# Patient Record
Sex: MALE | Race: WHITE | Employment: OTHER | URBAN - METROPOLITAN AREA
[De-identification: names, ages, dates, MRNs, and addresses within clinical notes are randomized per-mention and may not be internally consistent; named-entity substitution may affect disease eponyms.]

---

## 2017-05-16 ENCOUNTER — HOSPITAL ENCOUNTER (OUTPATIENT)
Dept: GENERAL RADIOLOGY | Age: 77
Discharge: HOME | End: 2017-05-16
Attending: FAMILY MEDICINE
Payer: MEDICARE

## 2017-05-16 ENCOUNTER — LAB ENCOUNTER (OUTPATIENT)
Dept: LAB | Age: 77
End: 2017-05-16
Attending: FAMILY MEDICINE
Payer: MEDICARE

## 2017-05-16 DIAGNOSIS — Z00.00 ROUTINE GENERAL MEDICAL EXAMINATION AT A HEALTH CARE FACILITY: ICD-10-CM

## 2017-05-16 DIAGNOSIS — Z00.00 ROUTINE GENERAL MEDICAL EXAMINATION AT HEALTH CARE FACILITY: Primary | ICD-10-CM

## 2017-05-16 PROCEDURE — 80053 COMPREHEN METABOLIC PANEL: CPT

## 2017-05-16 PROCEDURE — 85025 COMPLETE CBC W/AUTO DIFF WBC: CPT

## 2017-05-16 PROCEDURE — 71020 XR CHEST PA + LAT CHEST (CPT=71020): CPT | Performed by: FAMILY MEDICINE

## 2017-05-16 PROCEDURE — 36415 COLL VENOUS BLD VENIPUNCTURE: CPT

## 2017-05-16 PROCEDURE — 80061 LIPID PANEL: CPT

## 2017-05-23 ENCOUNTER — HOSPITAL ENCOUNTER (INPATIENT)
Facility: HOSPITAL | Age: 77
LOS: 7 days | Discharge: HOME | DRG: 392 | End: 2017-05-30
Attending: EMERGENCY MEDICINE | Admitting: FAMILY MEDICINE
Payer: MEDICARE

## 2017-05-23 ENCOUNTER — HOSPITAL ENCOUNTER (OUTPATIENT)
Dept: CT IMAGING | Age: 77
Discharge: HOME | DRG: 392 | End: 2017-05-23
Attending: FAMILY MEDICINE
Payer: MEDICARE

## 2017-05-23 DIAGNOSIS — R10.9 ABDOMINAL PAIN, UNSPECIFIED LOCATION: ICD-10-CM

## 2017-05-23 DIAGNOSIS — N18.9 ACUTE ON CHRONIC RENAL INSUFFICIENCY: ICD-10-CM

## 2017-05-23 DIAGNOSIS — N28.9 ACUTE ON CHRONIC RENAL INSUFFICIENCY: ICD-10-CM

## 2017-05-23 DIAGNOSIS — K57.92 ACUTE DIVERTICULITIS: Primary | ICD-10-CM

## 2017-05-23 PROCEDURE — 85027 COMPLETE CBC AUTOMATED: CPT | Performed by: FAMILY MEDICINE

## 2017-05-23 PROCEDURE — 80048 BASIC METABOLIC PNL TOTAL CA: CPT

## 2017-05-23 PROCEDURE — 80053 COMPREHEN METABOLIC PANEL: CPT | Performed by: EMERGENCY MEDICINE

## 2017-05-23 PROCEDURE — 99285 EMERGENCY DEPT VISIT HI MDM: CPT

## 2017-05-23 PROCEDURE — 85025 COMPLETE CBC W/AUTO DIFF WBC: CPT

## 2017-05-23 PROCEDURE — 74176 CT ABD & PELVIS W/O CONTRAST: CPT | Performed by: FAMILY MEDICINE

## 2017-05-23 PROCEDURE — 82565 ASSAY OF CREATININE: CPT

## 2017-05-23 PROCEDURE — 96365 THER/PROPH/DIAG IV INF INIT: CPT

## 2017-05-23 RX ORDER — ALFUZOSIN HYDROCHLORIDE 10 MG/1
10 TABLET, EXTENDED RELEASE ORAL
Status: DISCONTINUED | OUTPATIENT
Start: 2017-05-24 | End: 2017-05-30

## 2017-05-23 RX ORDER — FINASTERIDE 5 MG/1
5 TABLET, FILM COATED ORAL DAILY
Status: DISCONTINUED | OUTPATIENT
Start: 2017-05-24 | End: 2017-05-30

## 2017-05-23 RX ORDER — POTASSIUM CHLORIDE 20 MEQ/1
20 TABLET, EXTENDED RELEASE ORAL ONCE
Status: COMPLETED | OUTPATIENT
Start: 2017-05-23 | End: 2017-05-23

## 2017-05-23 RX ORDER — HYDROCORTISONE ACETATE 0.5 %
1 CREAM (GRAM) TOPICAL DAILY
COMMUNITY
End: 2017-05-23 | Stop reason: CLARIF

## 2017-05-23 RX ORDER — OXCARBAZEPINE 300 MG/1
600 TABLET, FILM COATED ORAL NIGHTLY
Status: DISCONTINUED | OUTPATIENT
Start: 2017-05-23 | End: 2017-05-30

## 2017-05-23 RX ORDER — OXCARBAZEPINE 300 MG/1
200 TABLET, FILM COATED ORAL DAILY
COMMUNITY
End: 2020-07-10

## 2017-05-23 RX ORDER — ROSUVASTATIN CALCIUM 10 MG/1
10 TABLET, COATED ORAL NIGHTLY
Status: DISCONTINUED | OUTPATIENT
Start: 2017-05-23 | End: 2017-05-30

## 2017-05-23 RX ORDER — ASPIRIN 81 MG/1
81 TABLET, CHEWABLE ORAL DAILY
COMMUNITY
End: 2020-07-10

## 2017-05-23 RX ORDER — ASPIRIN 81 MG/1
81 TABLET, CHEWABLE ORAL DAILY
Status: DISCONTINUED | OUTPATIENT
Start: 2017-05-24 | End: 2017-05-25

## 2017-05-23 RX ORDER — QUINIDINE GLUCONATE 324 MG
TABLET, EXTENDED RELEASE ORAL
COMMUNITY
End: 2019-12-23

## 2017-05-23 RX ORDER — OXCARBAZEPINE 150 MG/1
150 TABLET, FILM COATED ORAL DAILY
COMMUNITY
End: 2020-07-10

## 2017-05-23 RX ORDER — OXCARBAZEPINE 300 MG/1
200 TABLET, FILM COATED ORAL DAILY
Status: DISCONTINUED | OUTPATIENT
Start: 2017-05-24 | End: 2017-05-24

## 2017-05-23 RX ORDER — ROSUVASTATIN CALCIUM 10 MG/1
10 TABLET, COATED ORAL NIGHTLY
COMMUNITY
End: 2020-11-06

## 2017-05-23 RX ORDER — METHYLPHENIDATE HYDROCHLORIDE 10 MG/1
10 TABLET ORAL DAILY
Status: DISCONTINUED | OUTPATIENT
Start: 2017-05-24 | End: 2017-05-30

## 2017-05-23 RX ORDER — OXCARBAZEPINE 150 MG/1
450 TABLET, FILM COATED ORAL NIGHTLY
COMMUNITY
End: 2017-07-06

## 2017-05-23 RX ORDER — TRIAMTERENE AND HYDROCHLOROTHIAZIDE 75; 50 MG/1; MG/1
1 TABLET ORAL DAILY
Status: DISCONTINUED | OUTPATIENT
Start: 2017-05-24 | End: 2017-05-30

## 2017-05-23 RX ORDER — DOXEPIN HYDROCHLORIDE 50 MG/1
1 CAPSULE ORAL DAILY
Status: DISCONTINUED | OUTPATIENT
Start: 2017-05-24 | End: 2017-05-30

## 2017-05-23 RX ORDER — DOXEPIN HYDROCHLORIDE 50 MG/1
1 CAPSULE ORAL DAILY
COMMUNITY
End: 2020-07-10

## 2017-05-23 RX ORDER — DULOXETIN HYDROCHLORIDE 60 MG/1
150 CAPSULE, DELAYED RELEASE ORAL DAILY
COMMUNITY
End: 2017-07-06

## 2017-05-23 RX ORDER — LISINOPRIL 20 MG/1
20 TABLET ORAL DAILY
Status: DISCONTINUED | OUTPATIENT
Start: 2017-05-24 | End: 2017-05-30

## 2017-05-23 RX ORDER — METHYLPHENIDATE HYDROCHLORIDE 10 MG/1
10 TABLET ORAL DAILY
COMMUNITY
End: 2020-07-10

## 2017-05-23 RX ORDER — LISINOPRIL 20 MG/1
20 TABLET ORAL DAILY
COMMUNITY
End: 2020-11-06

## 2017-05-23 RX ORDER — TAMSULOSIN HYDROCHLORIDE 0.4 MG/1
0.4 CAPSULE ORAL DAILY
COMMUNITY

## 2017-05-23 RX ORDER — QUINIDINE GLUCONATE 324 MG
1500 TABLET, EXTENDED RELEASE ORAL DAILY
Status: DISCONTINUED | OUTPATIENT
Start: 2017-05-24 | End: 2017-05-23

## 2017-05-23 RX ORDER — TRIAMTERENE AND HYDROCHLOROTHIAZIDE 75; 50 MG/1; MG/1
1 TABLET ORAL DAILY
COMMUNITY
End: 2020-07-10

## 2017-05-23 RX ORDER — OXCARBAZEPINE 600 MG/1
600 TABLET, FILM COATED ORAL NIGHTLY
COMMUNITY
End: 2020-07-10

## 2017-05-23 RX ORDER — FINASTERIDE 5 MG/1
5 TABLET, FILM COATED ORAL DAILY
COMMUNITY
End: 2019-12-23

## 2017-05-23 RX ORDER — DULOXETIN HYDROCHLORIDE 30 MG/1
150 CAPSULE, DELAYED RELEASE ORAL DAILY
Status: DISCONTINUED | OUTPATIENT
Start: 2017-05-24 | End: 2017-05-30

## 2017-05-23 NOTE — ED PROVIDER NOTES
Patient Seen in: [facility] AND [facility] Emergency Department    History   No chief complaint on file. Stated Complaint: Abdominal Pain    HPI    68-year-old male presents for complaint of diverticulitis.   Patient reports that he has been having abdominal p negative. Positive for stated complaint: Abdominal Pain  Other systems are as noted in HPI. Constitutional and vital signs reviewed. All other systems reviewed and negative except as noted above.     PSFH elements reviewed from today and agreed e (*)     HCT 39.7 (*)     MPV 7.3 (*)     Neutrophil Absolute 9.7 (*)     Monocyte Absolute 1.1 (*)     All other components within normal limits   CBC WITH DIFFERENTIAL WITH PLATELET    Narrative:      The following orders were created for panel order CBC W PANCREAS: No lesion, fluid collection, ductal dilatation, or atrophy. ADRENALS: No defined mass or abnormal enlargement.   KIDNEYS: 27 mm diameter water density focus in the lateral midpole of the left kidney, compatible with cyst however it is not fully c phlegmonous change. No organized focal fluid collection or evidence of bowel perforation. Given the extent of inflammation, followup imaging is recommended in approximately 7-10 days to exclude abscess formation.  2.  Small bilateral fat-containing inguin

## 2017-05-23 NOTE — ED NOTES
Presents to ER with wife - referred by PMD for abnormal CT results. Pt with c/o pain across lower abdomen x4-5 days, outpatient CT performed at 1030am, + for diverticulitis. Also c/o dark loose stools x2-3 months.  States his pain is \"mild\" at this time a

## 2017-05-24 ENCOUNTER — PRIOR ORIGINAL RECORDS (OUTPATIENT)
Dept: OTHER | Age: 77
End: 2017-05-24

## 2017-05-24 PROBLEM — I10 HIGH BLOOD PRESSURE: Status: ACTIVE | Noted: 2017-05-24

## 2017-05-24 PROBLEM — G47.30 SLEEP APNEA: Status: ACTIVE | Noted: 2017-05-24

## 2017-05-24 PROBLEM — F31.9 BIPOLAR AFFECTIVE (HCC): Status: ACTIVE | Noted: 2017-05-24

## 2017-05-24 PROBLEM — E78.00 HIGH CHOLESTEROL: Status: ACTIVE | Noted: 2017-05-24

## 2017-05-24 PROCEDURE — 81003 URINALYSIS AUTO W/O SCOPE: CPT | Performed by: FAMILY MEDICINE

## 2017-05-24 PROCEDURE — 84132 ASSAY OF SERUM POTASSIUM: CPT | Performed by: FAMILY MEDICINE

## 2017-05-24 PROCEDURE — 80061 LIPID PANEL: CPT | Performed by: FAMILY MEDICINE

## 2017-05-24 RX ORDER — METRONIDAZOLE 500 MG/100ML
500 INJECTION, SOLUTION INTRAVENOUS EVERY 8 HOURS
Status: DISCONTINUED | OUTPATIENT
Start: 2017-05-24 | End: 2017-05-24

## 2017-05-24 RX ORDER — POTASSIUM CHLORIDE 20 MEQ/1
40 TABLET, EXTENDED RELEASE ORAL ONCE
Status: COMPLETED | OUTPATIENT
Start: 2017-05-24 | End: 2017-05-24

## 2017-05-24 RX ORDER — DEXTROSE, SODIUM CHLORIDE, AND POTASSIUM CHLORIDE 5; .45; .3 G/100ML; G/100ML; G/100ML
INJECTION INTRAVENOUS CONTINUOUS
Status: DISCONTINUED | OUTPATIENT
Start: 2017-05-24 | End: 2017-05-30

## 2017-05-24 RX ORDER — OXCARBAZEPINE 300 MG/1
150 TABLET, FILM COATED ORAL DAILY
Status: DISCONTINUED | OUTPATIENT
Start: 2017-05-24 | End: 2017-05-30

## 2017-05-24 RX ORDER — 0.9 % SODIUM CHLORIDE 0.9 %
VIAL (ML) INJECTION
Status: COMPLETED
Start: 2017-05-24 | End: 2017-05-24

## 2017-05-24 NOTE — H&P
[address] Patient Status:  Inpatient    10/21/1940 MRN [id number]   Location [facility] 5SW/SE Attending Glynis Meckel, MD   Hosp Day # 1 PCP Media Seip MD, Noé Haro MD     Date:  2017  Toby Griggs 150 MG Oral Tab Take 450 mg by mouth nightly. Glucosamine-Chondroit-Vit C-Mn (GLUCOSAMINE-CHONDROITIN) Oral Tab Take by mouth. aspirin 81 MG Oral Chew Tab Chew 81 mg by mouth daily. OXcarbazepine 600 MG Oral Tab Take 600 mg by mouth nightly.    Alcides Llanes fat-containing inguinal hernias. 3.  Left renal cyst. 4.  3 mm micronodule in the middle lobe, stable since 10/4/12 CT chest suggesting a benign process. 5.  Coronary artery calcifications. 6.  Large hiatal hernia.    Diverticulitis and followup recommendat

## 2017-05-24 NOTE — PLAN OF CARE
Problem: Patient Centered Care  Goal: Patient preferences are identified and integrated in the patient’s plan of care  Interventions:  - What would you like us to know as we care for you?  - Provide timely, complete, and accurate information to patient/fam algorithm/standards of care as needed  Outcome: Progressing

## 2017-05-24 NOTE — PLAN OF CARE
Problem: Patient Centered Care  Goal: Patient preferences are identified and integrated in the patient’s plan of care  Interventions:  - What would you like us to know as we care for you?   - Provide timely, complete, and accurate information to patient/fa routine/schedule  - Consider collaborating with pharmacy to review patient’s medication profile   Outcome: Progressing    Problem: SAFETY ADULT - FALL  Goal: Free from fall injury  INTERVENTIONS:  - Assess pt frequently for physical needs  - Identify cogni

## 2017-05-25 ENCOUNTER — APPOINTMENT (OUTPATIENT)
Dept: CT IMAGING | Facility: HOSPITAL | Age: 77
DRG: 392 | End: 2017-05-25
Attending: FAMILY MEDICINE
Payer: MEDICARE

## 2017-05-25 PROCEDURE — 80053 COMPREHEN METABOLIC PANEL: CPT | Performed by: FAMILY MEDICINE

## 2017-05-25 PROCEDURE — 74176 CT ABD & PELVIS W/O CONTRAST: CPT | Performed by: FAMILY MEDICINE

## 2017-05-25 PROCEDURE — 84132 ASSAY OF SERUM POTASSIUM: CPT | Performed by: FAMILY MEDICINE

## 2017-05-25 PROCEDURE — 85025 COMPLETE CBC W/AUTO DIFF WBC: CPT | Performed by: FAMILY MEDICINE

## 2017-05-25 PROCEDURE — 83735 ASSAY OF MAGNESIUM: CPT | Performed by: FAMILY MEDICINE

## 2017-05-25 RX ORDER — HYDROMORPHONE HYDROCHLORIDE 1 MG/ML
0.5 INJECTION, SOLUTION INTRAMUSCULAR; INTRAVENOUS; SUBCUTANEOUS EVERY 2 HOUR PRN
Status: DISCONTINUED | OUTPATIENT
Start: 2017-05-25 | End: 2017-05-30

## 2017-05-25 RX ORDER — POTASSIUM CHLORIDE 20 MEQ/1
40 TABLET, EXTENDED RELEASE ORAL ONCE
Status: COMPLETED | OUTPATIENT
Start: 2017-05-25 | End: 2017-05-25

## 2017-05-25 RX ORDER — HYDROMORPHONE HYDROCHLORIDE 1 MG/ML
0.3 INJECTION, SOLUTION INTRAMUSCULAR; INTRAVENOUS; SUBCUTANEOUS EVERY 2 HOUR PRN
Status: DISCONTINUED | OUTPATIENT
Start: 2017-05-25 | End: 2017-05-30

## 2017-05-25 RX ORDER — HEPARIN SODIUM 5000 [USP'U]/ML
5000 INJECTION, SOLUTION INTRAVENOUS; SUBCUTANEOUS EVERY 8 HOURS
Status: DISCONTINUED | OUTPATIENT
Start: 2017-05-25 | End: 2017-05-30

## 2017-05-25 RX ORDER — ACETAMINOPHEN 500 MG
1000 TABLET ORAL EVERY 8 HOURS PRN
Status: DISCONTINUED | OUTPATIENT
Start: 2017-05-25 | End: 2017-05-30

## 2017-05-25 NOTE — PLAN OF CARE
Problem: Patient Centered Care  Goal: Patient preferences are identified and integrated in the patient’s plan of care  Interventions:  - What would you like us to know as we care for you?  - Provide timely, complete, and accurate information to patient/fam function  - Maintain adequate hydration with IV or PO as ordered and tolerated  - Evaluate effectiveness of GI medications  - Encourage mobilization and activity  - Obtain nutritional consult as needed  - Establish a toileting routine/schedule  - Consider

## 2017-05-25 NOTE — PROGRESS NOTES
[facility]    Progress Note    Malachi Oliveros Patient Status:  Inpatient    10/21/1940 MRN [id number]   Location [facility] 5SW/SE Attending Isabella Hsieh MD   Hosp Day # 2 PCP Lilian Mackenzie MD, Yani Morejon MD       Subjective:   Nury Holt

## 2017-05-25 NOTE — PAYOR COMM NOTE
Admit Orders     Start     Ordered    05/23/17 1941  Admit to inpatient Once -  ([address])   Once     Ordering Provider:  Kenyetta Redmond MD   Question Answer Comment   Diagnosis Acute diverticulitis    Level of Care Acute    Bed MG Oral Tab,  Take 10 mg by mouth nightly. lisinopril 20 MG Oral Tab,  Take 20 mg by mouth daily. Triamterene-HCTZ 75-50 MG Oral Tab,  Take 1 tablet by mouth daily. DULoxetine HCl 60 MG Oral Cap DR Particles,  Take 150 mg by mouth daily.    Nehemiah Smith well-nourished. HENT:   Head: Normocephalic and atraumatic. Eyes: EOM are normal. Pupils are equal, round, and reactive to light. Neck: Normal range of motion. Neck supple. Cardiovascular: Normal rate and regular rhythm.     Pulmonary/Chest: Effort MDM    CBC with leukocytosis. CMP with acute on chronic renal insufficiency. IV fluids are given. He is started on Zosyn for his diverticulitis. There is no evidence for any perforation. He stable for medical floor.     Imaging:   Ct Abdomen+pelv obstruction. Terminal ileum is within normal limits. The appendix has been removed. There is no organized focal fluid collection, evidence of bowel necrosis, or evidence of bowel perforation.  ABDOMINAL WALL: Small bilateral fat-containing inguinal hernias Prescribed:  Current Discharge Medication List        Present on Admission  Date Reviewed: 4/30/2014          ICD-10-CM Noted POA    Acute diverticulitis K57.92 5/23/2017 Unknown                   Signed by Zenon Guevara MD on 5/23/2017  5:12 P Allergies    Prescriptions prior to admission:  Rosuvastatin Calcium 10 MG Oral Tab Take 10 mg by mouth nightly. lisinopril 20 MG Oral Tab Take 20 mg by mouth daily. Triamterene-HCTZ 75-50 MG Oral Tab Take 1 tablet by mouth daily.    DULoxetine HCl 60 M 05/23/2017   AST 51* 05/23/2017   ALT 67* 05/23/2017       Ct Abdomen+pelvis(cpt=74176)    5/23/2017  CONCLUSION:  1. There are findings compatible with acute diverticulitis in the distribution of the distal descending colon and proximal sigmoid colon.   Kermit Goldberg

## 2017-05-26 PROCEDURE — 84100 ASSAY OF PHOSPHORUS: CPT | Performed by: SURGERY

## 2017-05-26 PROCEDURE — 85025 COMPLETE CBC W/AUTO DIFF WBC: CPT | Performed by: SURGERY

## 2017-05-26 PROCEDURE — 83735 ASSAY OF MAGNESIUM: CPT | Performed by: SURGERY

## 2017-05-26 PROCEDURE — 80048 BASIC METABOLIC PNL TOTAL CA: CPT | Performed by: SURGERY

## 2017-05-26 NOTE — PROGRESS NOTES
[facility]    Progress Note    Krysta Mayo Patient Status:  Inpatient    10/21/1940 MRN [id number]   Location [facility] 5SW/SE Attending Phong Laureano MD   Hosp Day # 3 PCP Anastacia Redmond MD, Mitch Downs MD       Subjective:   Patsy Rios extensive surrounding inflammatory stranding and phlegmon. No evidence of abscess or free air. 2. Findings were described by [facility].                  Unknown Ruby BAKER, Yaima Tomas MD  5/26/2017

## 2017-05-26 NOTE — CONSULTS
[facility]    CONSULTATION REPORT    Pablo Penarudi Zuleta  FUL:[id number]  GMS:[id number]  LOS:2    Date of Admission:  5/23/2017  Date of Consult:  5/25/2017     Reason for Consultation: diverticulitis       History of Present Illness:  Marsh Adam Me 40 mEq infusion, , Intravenous, Continuous  •  OXcarbazepine (TRILEPTAL) tab 150 mg, 150 mg, Oral, Daily  •  DULoxetine HCl (CYMBALTA) DR particles cap 150 mg, 150 mg, Oral, Daily  •  finasteride (PROSCAR) tab 5 mg, 5 mg, Oral, Daily  •  aspirin chewable t Extremities: calves nontender, no edema, motor intact, sensory intact and SCD's on    Laboratory Data:  Recent Labs   Lab  05/23/17   1533  05/23/17   2142  05/25/17   0701   RBC  4.21*  4.06*  4.01*   HGB  13.4*  12.9*  12.6*   HCT  39.7*  38.0*  37.3* antibiotics, etc.  Option of peritoneal lavage discussed with the patient if he continues to clinically deteriorate.   He understood that there is a possibility he may need urgent operation with colostomy as a lifesaving maneuver although there is no need f

## 2017-05-26 NOTE — PROGRESS NOTES
[facility] - [facility]    General Surgery Progress Note  Izzy Howard  FBQ:[id number]  # 3       Subjective:   Complains of gas, bloating and lower abdominal pain  Passing flatus      Exam:     General: awake and alert, in no acute distress and in goo Recent Labs   Lab  05/23/17   2142  05/24/17   0530  05/25/17   0701  05/26/17   0520   GLU  112*   --   114*  135*   BUN  38*   --   23*  17   CREATSERUM  2.19*   --   1.84*  1.53*   GFRAA  36*   --   44*  54*   GFRNAA  30*   --   36*  45*   CA  8.4

## 2017-05-26 NOTE — PLAN OF CARE
Problem: Patient Centered Care  Goal: Patient preferences are identified and integrated in the patient’s plan of care  Interventions:  - What would you like us to know as we care for you?  Patient wants to be kept updated  - Provide timely, complete, and ac denies nausea, IV infusion D5.45 with 40mEq @ 100mL/hour  Goal: Maintains or returns to baseline bowel function  INTERVENTIONS:  - Assess bowel function  - Maintain adequate hydration with IV or PO as ordered and tolerated  - Evaluate effectiveness of GI m

## 2017-05-27 PROCEDURE — 85025 COMPLETE CBC W/AUTO DIFF WBC: CPT | Performed by: FAMILY MEDICINE

## 2017-05-27 PROCEDURE — 80048 BASIC METABOLIC PNL TOTAL CA: CPT | Performed by: FAMILY MEDICINE

## 2017-05-27 NOTE — PROGRESS NOTES
[facility]    Progress Note    Malachi Oliveros Patient Status:  Inpatient    10/21/1940 MRN [id number]   Location [facility] 5SW/SE Attending Isabella Hsieh MD   Hosp Day # 4 PCP Lilian Mackenzie MD, Yani Morejon MD       Subjective:   Nury Holt air. 2. Findings were described by [facility].                  Isreal Jones MD, Martha Mike MD  5/27/2017

## 2017-05-27 NOTE — PLAN OF CARE
Problem: Patient Centered Care  Goal: Patient preferences are identified and integrated in the patient’s plan of care  Interventions:  - What would you like us to know as we care for you?  - Provide timely, complete, and accurate information to patient/fam Maintain adequate hydration with IV or PO as ordered and tolerated  - Nasogastric tube to low intermittent suction as ordered  - Evaluate effectiveness of ordered antiemetic medications  - Provide nonpharmacologic comfort measures as appropriate  - Advance

## 2017-05-27 NOTE — PROGRESS NOTES
[facility] - [facility]    General Surgery Progress Note  Yani Pereira  KORY:[id number]  HD# 4       Subjective:   No unusual complaints, improved gas, bloating and lower abdominal pain  Passing flatus and liquid stool small amounts     Exam:     General: CA  8.5  8.5  8.4*   NA  136  136  135*   K  3.8  3.8  4.2  3.9   CL  100  104  102   CO2  26  24  25         Lab Results  Component Value Date   WBC 9.2 05/27/2017   HGB 12.5 05/27/2017   HCT 36.4 05/27/2017    05/27/2017    05/27/2017   K

## 2017-05-27 NOTE — PLAN OF CARE
Problem: Patient Centered Care  Goal: Patient preferences are identified and integrated in the patient’s plan of care  Interventions:  - Provide timely, complete, and accurate information to patient/family  - Incorporate patient and family knowledge, value review patient’s medication profile   Outcome: Not Progressing    Problem: SAFETY ADULT - FALL  Goal: Free from fall injury  INTERVENTIONS:  - Assess pt frequently for physical needs  - Identify cognitive and physical deficits and behaviors that affect ris

## 2017-05-28 PROCEDURE — 80048 BASIC METABOLIC PNL TOTAL CA: CPT | Performed by: FAMILY MEDICINE

## 2017-05-28 PROCEDURE — 84100 ASSAY OF PHOSPHORUS: CPT | Performed by: SURGERY

## 2017-05-28 PROCEDURE — 83735 ASSAY OF MAGNESIUM: CPT | Performed by: SURGERY

## 2017-05-28 PROCEDURE — 85025 COMPLETE CBC W/AUTO DIFF WBC: CPT | Performed by: FAMILY MEDICINE

## 2017-05-28 RX ORDER — MAGNESIUM OXIDE 400 MG (241.3 MG MAGNESIUM) TABLET
400 TABLET ONCE
Status: COMPLETED | OUTPATIENT
Start: 2017-05-28 | End: 2017-05-28

## 2017-05-28 NOTE — PROGRESS NOTES
[facility] \[facility]\"" - [facility]    General Surgery Progress Note  Luis Benavides  RNS:[id number]  # 5       Subjective:   No unusual complaints, improved gas, bloating and lower abdominal pain  Passing flatus and liquid stool small amounts     Exam:     General: GFRNAA  45*  39*  40*   CA  8.5  8.4*  8.7   NA  136  135*  136   K  4.2  3.9  4.0   CL  104  102  103   CO2  24  25  26         Lab Results  Component Value Date   WBC 6.9 05/28/2017   HGB 12.6 05/28/2017   HCT 36.7 05/28/2017    05/28/2017   NA

## 2017-05-28 NOTE — PLAN OF CARE
I received phone call from Telemetry reporting that patient had a brief heart rate drop to 35, and resumed back to the 90's. Patient was immediately assess. Patient was resting in the bed at present time. Patient denied chest pain or discomfort at time.  No

## 2017-05-28 NOTE — PROGRESS NOTES
[facility]    Progress Note    Karin Isaacs Patient Status:  Inpatient    10/21/1940 MRN [id number]   Location [facility] 5SW/SE Attending Felix Valerio MD   Hosp Day # 5 PCP Jaimee Carrreo MD, Terri Pepper MD       Subjective:   Norm Shirts

## 2017-05-28 NOTE — PLAN OF CARE
Problem: Patient Centered Care  Goal: Patient preferences are identified and integrated in the patient’s plan of care  Interventions:  - Provide timely, complete, and accurate information to patient/family  - Incorporate patient and family knowledge, value medication profile   Outcome: Progressing    Problem: SAFETY ADULT - FALL  Goal: Free from fall injury  INTERVENTIONS:  - Assess pt frequently for physical needs  - Identify cognitive and physical deficits and behaviors that affect risk of falls.   - Instit

## 2017-05-28 NOTE — PLAN OF CARE
Problem: Patient Centered Care  Goal: Patient preferences are identified and integrated in the patient’s plan of care  Interventions:  - What would you like us to know as we care for you  - Provide timely, complete, and accurate information to patient/fami absence of nausea and vomiting  INTERVENTIONS:  - Maintain adequate hydration with IV or PO as ordered and tolerated  - Nasogastric tube to low intermittent suction as ordered  - Evaluate effectiveness of ordered antiemetic medications  - Provide nonpharma repositions self while in bed every 2 hours.

## 2017-05-29 PROCEDURE — 80048 BASIC METABOLIC PNL TOTAL CA: CPT | Performed by: FAMILY MEDICINE

## 2017-05-29 PROCEDURE — 84466 ASSAY OF TRANSFERRIN: CPT | Performed by: FAMILY MEDICINE

## 2017-05-29 PROCEDURE — 83540 ASSAY OF IRON: CPT | Performed by: FAMILY MEDICINE

## 2017-05-29 PROCEDURE — 85025 COMPLETE CBC W/AUTO DIFF WBC: CPT | Performed by: FAMILY MEDICINE

## 2017-05-29 PROCEDURE — 84100 ASSAY OF PHOSPHORUS: CPT | Performed by: SURGERY

## 2017-05-29 PROCEDURE — 83735 ASSAY OF MAGNESIUM: CPT | Performed by: FAMILY MEDICINE

## 2017-05-29 RX ORDER — MAGNESIUM OXIDE 400 MG (241.3 MG MAGNESIUM) TABLET
400 TABLET ONCE
Status: COMPLETED | OUTPATIENT
Start: 2017-05-29 | End: 2017-05-29

## 2017-05-29 NOTE — PROGRESS NOTES
[facility] - [facility]    General Surgery Progress Note  Karin Isaacs  EK  HD# 6       Subjective:   No unusual complaints, improved gas, bloating and lower abdominal pain  Passing flatus and liquid stool small amounts     Exam:     General: 4.0     102  103   CO2  25  26          Carolyn Brower MD FACS  05/29/2017  8:14 AM

## 2017-05-29 NOTE — PLAN OF CARE
Problem: Patient Centered Care  Goal: Patient preferences are identified and integrated in the patient’s plan of care  Interventions:  - What would you like us to know as we care for you  - Provide timely, complete, and accurate information to patient/fami intravenous intake. (4) Vital signs and labs are monitored and recorded. (5) Patient is encouraged to ambulate in room and hallway and tolerates it well.     Problem: GASTROINTESTINAL - ADULT  Goal: Minimal or absence of nausea and vomiting  INTERVENTIONS document risk factors for pressure ulcer development  - Assess and document skin integrity  - Monitor for areas of redness and/or skin breakdown  - Initiate interventions, skin care algorithm/standards of care as needed   Outcome: Progressing  (1) Patient

## 2017-05-29 NOTE — PROGRESS NOTES
[facility]    Progress Note    Edilberto Zuleta Patient Status:  Inpatient    10/21/1940 MRN [id number]   Location [facility] 5SW/SE Attending Alexei Elizalde MD   Hosp Day # 6 PCP Luke Roach MD, Mercedes Montoya MD       Subjective:   Liss Cooley

## 2017-05-30 VITALS
OXYGEN SATURATION: 93 % | HEIGHT: 69 IN | HEART RATE: 70 BPM | DIASTOLIC BLOOD PRESSURE: 58 MMHG | SYSTOLIC BLOOD PRESSURE: 110 MMHG | RESPIRATION RATE: 20 BRPM | TEMPERATURE: 98 F | WEIGHT: 272.13 LBS | BODY MASS INDEX: 40.31 KG/M2

## 2017-05-30 PROCEDURE — 83735 ASSAY OF MAGNESIUM: CPT | Performed by: FAMILY MEDICINE

## 2017-05-30 PROCEDURE — 80048 BASIC METABOLIC PNL TOTAL CA: CPT | Performed by: SURGERY

## 2017-05-30 PROCEDURE — 85025 COMPLETE CBC W/AUTO DIFF WBC: CPT | Performed by: SURGERY

## 2017-05-30 RX ORDER — LEVOFLOXACIN 500 MG/1
500 TABLET, FILM COATED ORAL DAILY
Qty: 10 TABLET | Refills: 0 | Status: SHIPPED | OUTPATIENT
Start: 2017-05-30 | End: 2017-06-09

## 2017-05-30 RX ORDER — METRONIDAZOLE 500 MG/1
500 TABLET ORAL 2 TIMES DAILY
Qty: 28 TABLET | Refills: 0 | Status: SHIPPED | OUTPATIENT
Start: 2017-05-30 | End: 2017-06-13

## 2017-05-30 NOTE — DISCHARGE SUMMARY
[facility]    Discharge Summary    Malachi Oliveros Patient Status:  Inpatient    10/21/1940 MRN [id number]   Location [facility] 5SW/SE Attending Isabella Hsieh MD   Hosp Day # 7 PCP Lilian Mackenzie MD, Yani Morejon MD     Date of Admission: 5 extremities normal, atraumatic, no cyanosis or edema    History of Present Illness: Pt with history of abdominal pain since 5/5/17 due to severe diverticulitis that was confirmed on CT scan.  Pt was advised to come to hospital for IV abx due to the severity Cpep   Last time this was given:  150 mg on 5/30/2017  9:02 AM   Commonly known as:  CYMBALTA        Take 150 mg by mouth daily.     Refills:  0       finasteride 5 MG Tabs   Last time this was given:  5 mg on 5/30/2017  9:02 AM   Commonly known as:  Aimee Delia tablet by mouth daily.     Refills:  0            Where to Get Your Medications      Please  your prescriptions at the location directed by your doctor or nurse     Bring a paper prescription for each of these medications    - levofloxacin 500 MG Tab

## 2017-05-30 NOTE — PLAN OF CARE
Problem: Patient/Family Goals  Goal: Patient/Family Long Term Goal  Patient’s Long Term Goal: to go home. Interventions:  - Monitor vitals/labs. - Pain management. - Antibiotics as prescribed.     - See additional Care Plan goals for specific intervent injury  - Provide assistive devices as appropriate  - Consider OT/PT consult to assist with strengthening/mobility  - Encourage toileting schedule   Outcome: Progressing    Problem: SKIN/TISSUE INTEGRITY - ADULT  Goal: Skin integrity remains intact  INTERV

## 2017-05-30 NOTE — PROGRESS NOTES
[facility]    Progress Note    Zaki Thorpe Patient Status:  Inpatient    10/21/1940 MRN [id number]   Location [facility] 5SW/SE Attending Yesenia Gay MD   Hosp Day # 7 PCP Samantha Quigley MD, Malinda Ford MD       Subjective:   Ida Hernandez Serge Beaver MD  5/30/2017

## 2017-05-30 NOTE — PROGRESS NOTES
[facility]    General Surgery Progress Note  Gabrielle Diana  NZY:[id number]  # 7       Subjective:   No unusual complaints, denies  gas, bloating and lower abdominal pain  Passing flatus and stool small amounts     Exam:     General: awake a 8.8   NA  136  133*  136   K  4.0  4.0  4.4   CL  103  98  98   CO2  26  25  28         Lab Results  Component Value Date   WBC 6.7 05/30/2017   HGB 12.8 05/30/2017   HCT 37.3 05/30/2017    05/30/2017    05/30/2017   K 4.4 05/30/2017   CL 98 0

## 2017-06-16 ENCOUNTER — OFFICE VISIT (OUTPATIENT)
Dept: OTOLARYNGOLOGY | Facility: CLINIC | Age: 77
End: 2017-06-16

## 2017-06-16 VITALS
BODY MASS INDEX: 40.29 KG/M2 | DIASTOLIC BLOOD PRESSURE: 70 MMHG | HEIGHT: 69 IN | TEMPERATURE: 97 F | WEIGHT: 272 LBS | SYSTOLIC BLOOD PRESSURE: 112 MMHG

## 2017-06-16 DIAGNOSIS — R49.0 HOARSENESS: Primary | ICD-10-CM

## 2017-06-16 PROCEDURE — 99203 OFFICE O/P NEW LOW 30 MIN: CPT | Performed by: OTOLARYNGOLOGY

## 2017-06-16 PROCEDURE — 31575 DIAGNOSTIC LARYNGOSCOPY: CPT | Performed by: OTOLARYNGOLOGY

## 2017-06-16 RX ORDER — DICLOFENAC SODIUM AND MISOPROSTOL 75; 200 MG/1; UG/1
TABLET, DELAYED RELEASE ORAL
COMMUNITY
Start: 2017-03-23 | End: 2020-07-10

## 2017-06-16 RX ORDER — METHYLPHENIDATE HYDROCHLORIDE 5 MG/1
5 TABLET ORAL 2 TIMES DAILY
Refills: 0 | COMMUNITY
Start: 2017-06-02 | End: 2020-11-06

## 2017-06-16 RX ORDER — DULOXETIN HYDROCHLORIDE 30 MG/1
90 CAPSULE, DELAYED RELEASE ORAL
Refills: 0 | COMMUNITY
Start: 2017-04-28 | End: 2020-11-06

## 2017-06-16 RX ORDER — METRONIDAZOLE 500 MG/1
500 TABLET ORAL 2 TIMES DAILY
COMMUNITY
End: 2017-07-06 | Stop reason: ALTCHOICE

## 2017-06-16 RX ORDER — LEVOFLOXACIN 500 MG/1
500 TABLET, FILM COATED ORAL DAILY
COMMUNITY
End: 2017-07-06 | Stop reason: ALTCHOICE

## 2017-06-16 NOTE — PROGRESS NOTES
Loi Maddox is a 78year old male. Patient presents with:  Voice Problem: hoarseness of voice for 6 months    HPI:   For the last 6 months he's been experiencing problems with hoarseness and weakness in his voice.  This has been a continuous problem withou contact in left eye   • Osteoarthritis    • Diverticulosis of large intestine    • Depression    • Bipolar affective ([address]    • Pulmonary embolism (HCC)    • Essential hypertension       Social History:    Smoking Status: Former Smoker                   Pa Procedures:  Endoscopy/Laryngoscopy  Pre-Procedure Care: Verbal consent was obtained. Procedure/risks were explained. Questions were answered. Correct patient identified. Correct side and site confirmed.       A topical spray of 0.25% Neosynephrine was

## 2017-10-31 ENCOUNTER — PRIOR ORIGINAL RECORDS (OUTPATIENT)
Dept: OTHER | Age: 77
End: 2017-10-31

## 2017-11-01 ENCOUNTER — PRIOR ORIGINAL RECORDS (OUTPATIENT)
Dept: OTHER | Age: 77
End: 2017-11-01

## 2017-11-07 ENCOUNTER — MYAURORA ACCOUNT LINK (OUTPATIENT)
Dept: OTHER | Age: 77
End: 2017-11-07

## 2017-11-08 ENCOUNTER — MYAURORA ACCOUNT LINK (OUTPATIENT)
Dept: OTHER | Age: 77
End: 2017-11-08

## 2017-11-09 ENCOUNTER — PRIOR ORIGINAL RECORDS (OUTPATIENT)
Dept: OTHER | Age: 77
End: 2017-11-09

## 2017-11-21 LAB
CHOLESTEROL, TOTAL: 148 MG/DL
HDL CHOLESTEROL: 36 MG/DL
LDL CHOLESTEROL: 89 MG/DL
NON-HDL CHOLESTEROL: 112 MG/DL
TRIGLYCERIDES: 113 MG/DL

## 2017-11-27 ENCOUNTER — PRIOR ORIGINAL RECORDS (OUTPATIENT)
Dept: OTHER | Age: 77
End: 2017-11-27

## 2017-11-27 ENCOUNTER — HOSPITAL ENCOUNTER (OUTPATIENT)
Dept: BONE DENSITY | Facility: HOSPITAL | Age: 77
Discharge: HOME | End: 2017-11-27
Attending: INTERNAL MEDICINE
Payer: MEDICARE

## 2017-11-27 ENCOUNTER — HOSPITAL ENCOUNTER (OUTPATIENT)
Dept: RESPIRATORY THERAPY | Facility: HOSPITAL | Age: 77
Discharge: HOME | End: 2017-11-27
Attending: INTERNAL MEDICINE
Payer: MEDICARE

## 2017-11-27 DIAGNOSIS — E66.9 OBESITY, UNSPECIFIED CLASSIFICATION, UNSPECIFIED OBESITY TYPE, UNSPECIFIED WHETHER SERIOUS COMORBIDITY PRESENT: ICD-10-CM

## 2017-11-27 DIAGNOSIS — R06.00 DYSPNEA: ICD-10-CM

## 2017-11-27 PROCEDURE — 94060 EVALUATION OF WHEEZING: CPT | Performed by: INTERNAL MEDICINE

## 2017-11-27 PROCEDURE — 94726 PLETHYSMOGRAPHY LUNG VOLUMES: CPT | Performed by: INTERNAL MEDICINE

## 2017-11-27 PROCEDURE — 94729 DIFFUSING CAPACITY: CPT | Performed by: INTERNAL MEDICINE

## 2017-11-27 PROCEDURE — 77080 DXA BONE DENSITY AXIAL: CPT | Performed by: INTERNAL MEDICINE

## 2017-11-29 NOTE — PROCEDURES
[facility]    Patient's Name Luis Daley MRN [id number]    10/21/1940 Pulmonologist Cony Nuñez MD   Location [facility] PCP Samantha Quigley MD, Malinda Ford MD     IMPRESSION:    The PFTs are Normal.    No change in flows

## 2017-11-29 NOTE — ADDENDUM NOTE
Encounter addended by: Josue Black MD on: 11/29/2017 12:19 PM<BR>    Actions taken: Sign clinical note, Charge Capture section accepted

## 2017-12-01 ENCOUNTER — PRIOR ORIGINAL RECORDS (OUTPATIENT)
Dept: OTHER | Age: 77
End: 2017-12-01

## 2017-12-01 ENCOUNTER — MYAURORA ACCOUNT LINK (OUTPATIENT)
Dept: OTHER | Age: 77
End: 2017-12-01

## 2018-11-19 ENCOUNTER — LAB ENCOUNTER (OUTPATIENT)
Dept: LAB | Age: 78
End: 2018-11-19
Attending: FAMILY MEDICINE
Payer: MEDICARE

## 2018-11-19 ENCOUNTER — HOSPITAL ENCOUNTER (OUTPATIENT)
Dept: GENERAL RADIOLOGY | Age: 78
Discharge: HOME | End: 2018-11-19
Attending: FAMILY MEDICINE
Payer: MEDICARE

## 2018-11-19 DIAGNOSIS — R06.00 DYSPNEA: ICD-10-CM

## 2018-11-19 DIAGNOSIS — N91.2 AMENIA: Primary | ICD-10-CM

## 2018-11-19 DIAGNOSIS — R27.0 ATAXIA: ICD-10-CM

## 2018-11-19 DIAGNOSIS — D64.9 ANEMIA: ICD-10-CM

## 2018-11-19 DIAGNOSIS — R30.0 DYSURIA: ICD-10-CM

## 2018-11-19 PROCEDURE — 71046 X-RAY EXAM CHEST 2 VIEWS: CPT | Performed by: FAMILY MEDICINE

## 2018-11-19 PROCEDURE — 36415 COLL VENOUS BLD VENIPUNCTURE: CPT

## 2018-11-19 PROCEDURE — 85025 COMPLETE CBC W/AUTO DIFF WBC: CPT

## 2018-11-20 ENCOUNTER — ORDER TRANSCRIPTION (OUTPATIENT)
Dept: SLEEP CENTER | Age: 78
End: 2018-11-20

## 2018-11-20 DIAGNOSIS — G47.33 OSA (OBSTRUCTIVE SLEEP APNEA): Primary | ICD-10-CM

## 2018-11-28 ENCOUNTER — HOSPITAL ENCOUNTER (OUTPATIENT)
Dept: MRI IMAGING | Age: 78
Discharge: HOME | End: 2018-11-28
Attending: FAMILY MEDICINE
Payer: MEDICARE

## 2018-11-28 DIAGNOSIS — D64.9 ANEMIA: ICD-10-CM

## 2018-11-28 DIAGNOSIS — I20.8 ANGINA EFFORT: ICD-10-CM

## 2018-11-28 DIAGNOSIS — R06.00 DYSPNEA: ICD-10-CM

## 2018-11-28 PROCEDURE — 70551 MRI BRAIN STEM W/O DYE: CPT | Performed by: FAMILY MEDICINE

## 2018-12-01 ENCOUNTER — OFFICE VISIT (OUTPATIENT)
Dept: SLEEP CENTER | Age: 78
End: 2018-12-01
Attending: FAMILY MEDICINE
Payer: MEDICARE

## 2018-12-01 DIAGNOSIS — Z76.89 SLEEP CONCERN: Primary | ICD-10-CM

## 2018-12-01 PROCEDURE — 95811 POLYSOM 6/>YRS CPAP 4/> PARM: CPT

## 2018-12-05 NOTE — PROCEDURES
[facility]  Accredited by the [employer] ([employer]ASM)    PATIENT'S NAME: Nisreen Kaba   ATTENDING PHYSICIAN: Viola Merchant. Gallo Manzano MD   REFERRING PHYSICIAN: Viola Merchant.  Gallo Manzano MD   PATIENT ACCOUNT #: [de-identified] LOCATION: Sleep Center expiratory pressure of 7, minimal pressure support 3, maximal pressure support 16, EPR set at 3. Dictated By Judy Fernandez MD  d: 12/05/2018 10:04:10  t: 12/05/2018 10:36:54  [facility] [id number]  [facility]/

## 2018-12-29 ENCOUNTER — LAB ENCOUNTER (OUTPATIENT)
Dept: LAB | Age: 78
End: 2018-12-29
Attending: FAMILY MEDICINE
Payer: MEDICARE

## 2018-12-29 DIAGNOSIS — D64.9 ANEMIA: Primary | ICD-10-CM

## 2018-12-29 PROCEDURE — 36415 COLL VENOUS BLD VENIPUNCTURE: CPT

## 2018-12-29 PROCEDURE — 85025 COMPLETE CBC W/AUTO DIFF WBC: CPT

## 2019-01-01 ENCOUNTER — EXTERNAL RECORD (OUTPATIENT)
Dept: HEALTH INFORMATION MANAGEMENT | Facility: OTHER | Age: 79
End: 2019-01-01

## 2019-02-28 VITALS
WEIGHT: 284 LBS | HEIGHT: 69 IN | SYSTOLIC BLOOD PRESSURE: 110 MMHG | OXYGEN SATURATION: 97 % | DIASTOLIC BLOOD PRESSURE: 60 MMHG | HEART RATE: 75 BPM | BODY MASS INDEX: 42.06 KG/M2

## 2019-02-28 VITALS
WEIGHT: 280 LBS | HEART RATE: 78 BPM | OXYGEN SATURATION: 92 % | HEIGHT: 67 IN | BODY MASS INDEX: 43.95 KG/M2 | SYSTOLIC BLOOD PRESSURE: 110 MMHG | DIASTOLIC BLOOD PRESSURE: 58 MMHG

## 2019-05-10 ENCOUNTER — LAB ENCOUNTER (OUTPATIENT)
Dept: LAB | Age: 79
End: 2019-05-10
Attending: FAMILY MEDICINE
Payer: MEDICARE

## 2019-05-10 DIAGNOSIS — D50.9 IRON DEFICIENCY ANEMIA, UNSPECIFIED: ICD-10-CM

## 2019-05-10 DIAGNOSIS — I10 ESSENTIAL HYPERTENSION: Primary | ICD-10-CM

## 2019-05-10 DIAGNOSIS — N28.9 URETERAL SLUDGE: ICD-10-CM

## 2019-05-10 LAB
ABSOLUTE IMMATURE GRANULOCYTES (OFFPRE24): NORMAL
ANION GAP SERPL CALC-SCNC: NORMAL MMOL/L
BASO+EOS+MONOS # BLD: NORMAL 10*3/UL
BASO+EOS+MONOS NFR BLD: NORMAL %
BASOPHILS # BLD: NORMAL 10*3/UL
BASOPHILS NFR BLD: NORMAL %
BUN SERPL-MCNC: 36 MG/DL
BUN/CREAT SERPL: NORMAL
CALCIUM SERPL-MCNC: 8.9 MG/DL
CHLORIDE SERPL-SCNC: 102 MMOL/L
CO2 SERPL-SCNC: 26 MMOL/L
CREAT SERPL-MCNC: 1.64 MG/DL
DIFFERENTIAL METHOD BLD: NORMAL
EOSINOPHIL # BLD: NORMAL 10*3/UL
EOSINOPHIL NFR BLD: NORMAL %
ERYTHROCYTE [DISTWIDTH] IN BLOOD: NORMAL %
GLUCOSE SERPL-MCNC: 85 MG/DL
HCT VFR BLD CALC: 42.8 %
HCT VFR BLD CALC: 42.8 %
HCT VFR BLD CALC: NORMAL %
HGB BLD-MCNC: 13.8 G/DL
HGB BLD-MCNC: 13.8 G/DL
HGB BLD-MCNC: NORMAL G/DL
IMMATURE GRANULOCYTES (OFFPRE25): NORMAL
LENGTH OF FAST TIME PATIENT: NORMAL H
LYMPHOCYTES # BLD: NORMAL 10*3/UL
LYMPHOCYTES NFR BLD: NORMAL %
MCH RBC QN AUTO: 31.7 PG
MCH RBC QN AUTO: NORMAL PG
MCH RBC QN AUTO: NORMAL PG
MCHC RBC AUTO-ENTMCNC: 32.2 G/DL
MCHC RBC AUTO-ENTMCNC: NORMAL G/DL
MCHC RBC AUTO-ENTMCNC: NORMAL G/DL
MCV RBC AUTO: 98.4 FL
MCV RBC AUTO: NORMAL FL
MCV RBC AUTO: NORMAL FL
MONOCYTES # BLD: NORMAL 10*3/UL
MONOCYTES NFR BLD: NORMAL %
MPV (OFFPRE2): NORMAL
NEUTROPHILS # BLD: NORMAL 10*3/UL
NEUTROPHILS NFR BLD: NORMAL %
NRBC BLD MANUAL-RTO: NORMAL %
PLAT MORPH BLD: NORMAL
PLATELET # BLD: 196 10*3/UL
PLATELET # BLD: 196 10*3/UL
PLATELET # BLD: NORMAL 10*3/UL
POTASSIUM SERPL-SCNC: 3.7 MMOL/L
RBC # BLD: 4.35 10*6/UL
RBC # BLD: 4.35 10*6/UL
RBC # BLD: NORMAL 10*6/UL
RBC MORPH BLD: NORMAL
SODIUM SERPL-SCNC: 134 MMOL/L
TSH SERPL-ACNC: 1.13 M[IU]/L
WBC # BLD: 6.3 10*3/UL
WBC # BLD: 6.3 10*3/UL
WBC # BLD: NORMAL 10*3/UL
WBC MORPH BLD: NORMAL

## 2019-05-10 PROCEDURE — 85025 COMPLETE CBC W/AUTO DIFF WBC: CPT

## 2019-05-10 PROCEDURE — 82728 ASSAY OF FERRITIN: CPT

## 2019-05-10 PROCEDURE — 84466 ASSAY OF TRANSFERRIN: CPT

## 2019-05-10 PROCEDURE — 80048 BASIC METABOLIC PNL TOTAL CA: CPT

## 2019-05-10 PROCEDURE — 83540 ASSAY OF IRON: CPT

## 2019-05-10 PROCEDURE — 36415 COLL VENOUS BLD VENIPUNCTURE: CPT

## 2019-05-10 PROCEDURE — 84443 ASSAY THYROID STIM HORMONE: CPT

## 2019-05-30 ENCOUNTER — APPOINTMENT (OUTPATIENT)
Dept: GENERAL RADIOLOGY | Age: 79
End: 2019-05-30
Attending: EMERGENCY MEDICINE
Payer: MEDICARE

## 2019-05-30 ENCOUNTER — HOSPITAL ENCOUNTER (OUTPATIENT)
Age: 79
Discharge: HOME | End: 2019-05-30
Attending: EMERGENCY MEDICINE
Payer: MEDICARE

## 2019-05-30 ENCOUNTER — HOSPITAL SCAN (OUTPATIENT)
Dept: HEALTH INFORMATION MANAGEMENT | Facility: OTHER | Age: 79
End: 2019-05-30

## 2019-05-30 VITALS
OXYGEN SATURATION: 96 % | TEMPERATURE: 99 F | HEIGHT: 68 IN | BODY MASS INDEX: 39.4 KG/M2 | RESPIRATION RATE: 20 BRPM | HEART RATE: 77 BPM | SYSTOLIC BLOOD PRESSURE: 131 MMHG | WEIGHT: 260 LBS | DIASTOLIC BLOOD PRESSURE: 58 MMHG

## 2019-05-30 DIAGNOSIS — J20.9 ACUTE BRONCHITIS WITH BRONCHOSPASM: Primary | ICD-10-CM

## 2019-05-30 PROCEDURE — 94640 AIRWAY INHALATION TREATMENT: CPT

## 2019-05-30 PROCEDURE — 99214 OFFICE O/P EST MOD 30 MIN: CPT

## 2019-05-30 PROCEDURE — 71046 X-RAY EXAM CHEST 2 VIEWS: CPT | Performed by: EMERGENCY MEDICINE

## 2019-05-30 RX ORDER — IPRATROPIUM BROMIDE AND ALBUTEROL SULFATE 2.5; .5 MG/3ML; MG/3ML
3 SOLUTION RESPIRATORY (INHALATION) ONCE
Status: COMPLETED | OUTPATIENT
Start: 2019-05-30 | End: 2019-05-30

## 2019-05-30 RX ORDER — PREDNISONE 20 MG/1
40 TABLET ORAL DAILY
Qty: 6 TABLET | Refills: 0 | Status: SHIPPED | OUTPATIENT
Start: 2019-05-30 | End: 2019-06-02

## 2019-05-30 RX ORDER — ALBUTEROL SULFATE 90 UG/1
2 AEROSOL, METERED RESPIRATORY (INHALATION) EVERY 4 HOURS PRN
Qty: 1 INHALER | Refills: 0 | Status: SHIPPED | OUTPATIENT
Start: 2019-05-30 | End: 2019-06-29

## 2019-05-30 NOTE — ED INITIAL ASSESSMENT (HPI)
COUGH X 1 WEEK. PATIENT SAW HIS PCP LAST WEEK AND WAS PRESCRIBED AUGMENTIN. NO IMPROVEMENT WITH THE ANTIBIOTIC AND WAS TOLD TO ADD MUCINEX AND ROBITUSSIN WITHOUT IMPROVEMENT IN SYMPTOMS.   PATIENT REPORTS FEVER X 1 DAY WHEN URI SYMPTOMS

## 2019-05-30 NOTE — ED PROVIDER NOTES
Patient Seen in: [facility]    History   Patient presents with:  Cough/URI    Stated Complaint: TL- cough    HPI    The patient is a 53-year-old male with a history of hypertension, hypercholesterolemia, pulmonary embolis All other systems reviewed and negative except as noted above.     Physical Exam     ED Triage Vitals [05/30/19 1324]   /58   Pulse 77   Resp 20   Temp 98.8 °F (37.1 °C)   Temp src Oral   SpO2 96 %   O2 Device None (Room air)       Current:/ 0    Albuterol Sulfate  (90 Base) MCG/ACT Inhalation Aero Soln  Inhale 2 puffs into the lungs every 4 (four) hours as needed for Wheezing. Qty: 1 Inhaler Refills: 0    Spacer/Aero Chamber Mouthpiece Does not apply Misc  Use with inhaler.   Pharmacis

## 2019-07-11 PROCEDURE — 87493 C DIFF AMPLIFIED PROBE: CPT | Performed by: INTERNAL MEDICINE

## 2019-07-11 PROCEDURE — 87045 FECES CULTURE AEROBIC BACT: CPT | Performed by: INTERNAL MEDICINE

## 2019-07-11 PROCEDURE — 87046 STOOL CULTR AEROBIC BACT EA: CPT | Performed by: INTERNAL MEDICINE

## 2019-07-23 ENCOUNTER — HOSPITAL ENCOUNTER (OUTPATIENT)
Dept: CT IMAGING | Age: 79
Discharge: HOME | End: 2019-07-23
Attending: FAMILY MEDICINE

## 2019-07-23 ENCOUNTER — HOSPITAL SCAN (OUTPATIENT)
Dept: HEALTH INFORMATION MANAGEMENT | Facility: OTHER | Age: 79
End: 2019-07-23

## 2019-07-23 DIAGNOSIS — Z13.9 ENCOUNTER FOR SCREENING: ICD-10-CM

## 2019-07-23 NOTE — PROGRESS NOTES
Pt seen at [facility], [facility] for CTHS:  PRELIMINARY SCORE= 391.0  BP= 117/78  Cholestec labs as follows: Fasting  TC= 195  HDL= 52  LDL= 125 (on Crestor 20mg)  TG= 89  GLUCOSE= 98    To be scheduled for the Free PV Screening.     All results and risk factors d

## 2019-08-08 ENCOUNTER — CLINICAL ABSTRACT (OUTPATIENT)
Dept: CARDIOLOGY | Age: 79
End: 2019-08-08

## 2019-08-08 PROBLEM — R06.00 DYSPNEA: Status: ACTIVE | Noted: 2019-08-08

## 2019-08-08 PROBLEM — E66.9 OBESITY: Status: ACTIVE | Noted: 2019-08-08

## 2019-08-08 PROBLEM — I10 HYPERTENSION: Status: ACTIVE | Noted: 2019-08-08

## 2019-08-08 PROBLEM — E78.00 HYPERCHOLESTEREMIA: Status: ACTIVE | Noted: 2019-08-08

## 2019-08-08 RX ORDER — TRIAMTERENE AND HYDROCHLOROTHIAZIDE 75; 50 MG/1; MG/1
TABLET ORAL
COMMUNITY
Start: 2013-08-02 | End: 2019-12-23

## 2019-08-08 RX ORDER — DULOXETIN HYDROCHLORIDE 30 MG/1
30 CAPSULE, DELAYED RELEASE ORAL DAILY
COMMUNITY
Start: 2017-11-01

## 2019-08-08 RX ORDER — TAMSULOSIN HYDROCHLORIDE 0.4 MG/1
0.4 CAPSULE ORAL 2 TIMES DAILY
COMMUNITY
Start: 2017-11-01

## 2019-08-08 RX ORDER — ROSUVASTATIN CALCIUM 20 MG/1
20 TABLET, COATED ORAL DAILY
COMMUNITY
Start: 2013-08-02

## 2019-08-08 RX ORDER — BICALUTAMIDE 50 MG/1
50 TABLET, FILM COATED ORAL DAILY
COMMUNITY
Start: 2017-11-01 | End: 2019-08-09

## 2019-08-08 RX ORDER — MULTIVITAMIN
1 CAPSULE ORAL DAILY
COMMUNITY
Start: 2013-08-02 | End: 2020-06-24

## 2019-08-08 RX ORDER — ASPIRIN 81 MG/1
81 TABLET ORAL DAILY
COMMUNITY
Start: 2017-11-01 | End: 2019-08-09 | Stop reason: ALTCHOICE

## 2019-08-08 RX ORDER — LISINOPRIL 20 MG/1
20 TABLET ORAL DAILY
COMMUNITY
Start: 2013-08-02 | End: 2020-11-04

## 2019-08-08 RX ORDER — DULOXETIN HYDROCHLORIDE 60 MG/1
60 CAPSULE, DELAYED RELEASE ORAL DAILY
COMMUNITY
Start: 2017-12-01

## 2019-08-08 RX ORDER — METHYLPHENIDATE HYDROCHLORIDE 10 MG/1
10 TABLET ORAL DAILY
COMMUNITY
Start: 2013-08-02

## 2019-08-09 ENCOUNTER — HOSPITAL ENCOUNTER (OUTPATIENT)
Dept: ULTRASOUND IMAGING | Age: 79
Discharge: HOME | End: 2019-08-09
Attending: FAMILY MEDICINE

## 2019-08-09 ENCOUNTER — OFFICE VISIT (OUTPATIENT)
Dept: CARDIOLOGY | Age: 79
End: 2019-08-09

## 2019-08-09 VITALS
HEART RATE: 87 BPM | HEIGHT: 68 IN | OXYGEN SATURATION: 94 % | BODY MASS INDEX: 41.22 KG/M2 | WEIGHT: 272 LBS | DIASTOLIC BLOOD PRESSURE: 62 MMHG | SYSTOLIC BLOOD PRESSURE: 110 MMHG

## 2019-08-09 DIAGNOSIS — Z13.9 ENCOUNTER FOR SCREENING: ICD-10-CM

## 2019-08-09 DIAGNOSIS — E78.00 HYPERCHOLESTEREMIA: ICD-10-CM

## 2019-08-09 DIAGNOSIS — R06.09 DYSPNEA ON EXERTION: Primary | ICD-10-CM

## 2019-08-09 DIAGNOSIS — I10 ESSENTIAL HYPERTENSION: ICD-10-CM

## 2019-08-09 PROCEDURE — 99214 OFFICE O/P EST MOD 30 MIN: CPT | Performed by: INTERNAL MEDICINE

## 2019-08-09 PROCEDURE — 3074F SYST BP LT 130 MM HG: CPT | Performed by: INTERNAL MEDICINE

## 2019-08-09 PROCEDURE — 3078F DIAST BP <80 MM HG: CPT | Performed by: INTERNAL MEDICINE

## 2019-08-09 RX ORDER — FUROSEMIDE 40 MG/1
40 TABLET ORAL DAILY
Qty: 90 TABLET | Refills: 0 | OUTPATIENT
Start: 2019-08-09

## 2019-08-09 RX ORDER — POTASSIUM CHLORIDE 20 MEQ/1
20 TABLET, EXTENDED RELEASE ORAL DAILY
Qty: 90 TABLET | Refills: 0 | OUTPATIENT
Start: 2019-08-09

## 2019-08-09 RX ORDER — TOLTERODINE TARTRATE 2 MG/1
2 TABLET, EXTENDED RELEASE ORAL DAILY
COMMUNITY

## 2019-08-09 RX ORDER — OMEPRAZOLE 40 MG/1
40 CAPSULE, DELAYED RELEASE ORAL DAILY
COMMUNITY
End: 2020-06-24

## 2019-08-09 RX ORDER — POTASSIUM CHLORIDE 20 MEQ/1
20 TABLET, EXTENDED RELEASE ORAL DAILY
Qty: 30 TABLET | Refills: 0 | Status: SHIPPED | OUTPATIENT
Start: 2019-08-09 | End: 2019-08-15

## 2019-08-09 RX ORDER — FUROSEMIDE 40 MG/1
40 TABLET ORAL DAILY
Qty: 30 TABLET | Refills: 0 | Status: SHIPPED | OUTPATIENT
Start: 2019-08-09 | End: 2019-08-19 | Stop reason: ALTCHOICE

## 2019-08-09 NOTE — PROGRESS NOTES
Pt seen at [facility] Stroke Screening tests:    PRELIMINARY results as follows:         Carotid US= Bilateral Mild plaque    Abdominal Aorta US= Normal         BP= 117/78    Cholestec labs as follows: From 7/23/19    TC= 195    HDL= 52

## 2019-08-12 ENCOUNTER — LAB ENCOUNTER (OUTPATIENT)
Dept: LAB | Age: 79
End: 2019-08-12
Attending: INTERNAL MEDICINE
Payer: MEDICARE

## 2019-08-12 DIAGNOSIS — R06.00 DYSPNEA ON EXERTION: Primary | ICD-10-CM

## 2019-08-12 DIAGNOSIS — E78.00 HYPERCHOLESTEREMIA: ICD-10-CM

## 2019-08-12 LAB
ALBUMIN SERPL-MCNC: NORMAL G/DL
ALBUMIN/GLOB SERPL: NORMAL {RATIO}
ALP SERPL-CCNC: NORMAL U/L
ALT SERPL-CCNC: NORMAL U/L
ANION GAP SERPL CALC-SCNC: 8 MMOL/L
ANION GAP SERPL CALC-SCNC: 8 MMOL/L
ANION GAP SERPL CALC-SCNC: 8 MMOL/L (ref 0–18)
AST SERPL-CCNC: NORMAL U/L
BILIRUB SERPL-MCNC: NORMAL MG/DL
BUN BLD-MCNC: 34 MG/DL (ref 7–18)
BUN SERPL-MCNC: 34 MG/DL
BUN SERPL-MCNC: 34 MG/DL
BUN/CREAT SERPL: 21.9
BUN/CREAT SERPL: 21.9 (ref 10–20)
BUN/CREAT SERPL: NORMAL
CALCIUM BLD-MCNC: 8.9 MG/DL (ref 8.5–10.1)
CALCIUM SERPL-MCNC: 8.9 MG/DL
CALCIUM SERPL-MCNC: 8.9 MG/DL
CHLORIDE SERPL-SCNC: 107 MMOL/L
CHLORIDE SERPL-SCNC: 107 MMOL/L
CHLORIDE SERPL-SCNC: 107 MMOL/L (ref 98–112)
CHOLEST SERPL-MCNC: 194 MG/DL
CHOLEST SMN-MCNC: 194 MG/DL (ref ?–200)
CHOLEST/HDLC SERPL: 61 {RATIO}
CHOLEST/HDLC SERPL: NORMAL {RATIO}
CHOLEST/HDLC SERPL: NORMAL {RATIO}
CO2 SERPL-SCNC: 29 MMOL/L
CO2 SERPL-SCNC: 29 MMOL/L
CO2 SERPL-SCNC: 29 MMOL/L (ref 21–32)
CREAT BLD-MCNC: 1.55 MG/DL (ref 0.7–1.3)
CREAT SERPL-MCNC: 1.55 MG/DL
CREAT SERPL-MCNC: 1.55 MG/DL
GLOBULIN SER-MCNC: NORMAL G/DL
GLUCOSE BLD-MCNC: 115 MG/DL (ref 70–99)
GLUCOSE SERPL-MCNC: 115 MG/DL
GLUCOSE SERPL-MCNC: 115 MG/DL
HDLC SERPL-MCNC: 61 MG/DL
HDLC SERPL-MCNC: 61 MG/DL
HDLC SERPL-MCNC: 61 MG/DL (ref 40–59)
HDLC SERPL-MCNC: NORMAL MG/DL
LDLC SERPL CALC-MCNC: 116 MG/DL
LDLC SERPL CALC-MCNC: 116 MG/DL (ref ?–100)
LENGTH OF FAST TIME PATIENT: NORMAL H
LENGTH OF FAST TIME PATIENT: NORMAL H
LENGTH OF FAST TIME PATIENT: YES H
NONHDLC SERPL-MCNC: 133 MG/DL
NONHDLC SERPL-MCNC: 133 MG/DL (ref ?–130)
OSMOLALITY SERPL CALC.SUM OF ELEC: 307 MOSM/KG (ref 275–295)
PATIENT FASTING: YES
PATIENT FASTING: YES
POTASSIUM SERPL-SCNC: 3.8 MMOL/L
POTASSIUM SERPL-SCNC: 3.8 MMOL/L
POTASSIUM SERPL-SCNC: 3.8 MMOL/L (ref 3.5–5.1)
PROT SERPL-MCNC: NORMAL G/DL
SODIUM SERPL-SCNC: 144 MMOL/L
SODIUM SERPL-SCNC: 144 MMOL/L
SODIUM SERPL-SCNC: 144 MMOL/L (ref 136–145)
TRIGL SERPL-MCNC: 87 MG/DL
TRIGL SERPL-MCNC: 87 MG/DL (ref 30–149)
VLDLC SERPL CALC-MCNC: 17 MG/DL
VLDLC SERPL CALC-MCNC: 17 MG/DL (ref 0–30)

## 2019-08-12 PROCEDURE — 80061 LIPID PANEL: CPT

## 2019-08-12 PROCEDURE — 80048 BASIC METABOLIC PNL TOTAL CA: CPT

## 2019-08-12 PROCEDURE — 36415 COLL VENOUS BLD VENIPUNCTURE: CPT

## 2019-08-13 ENCOUNTER — ANCILLARY PROCEDURE (OUTPATIENT)
Dept: CARDIOLOGY | Age: 79
End: 2019-08-13
Attending: INTERNAL MEDICINE

## 2019-08-13 DIAGNOSIS — R06.09 DYSPNEA ON EXERTION: ICD-10-CM

## 2019-08-13 PROCEDURE — 93306 TTE W/DOPPLER COMPLETE: CPT | Performed by: INTERNAL MEDICINE

## 2019-08-14 RX ORDER — NAPROXEN SODIUM 220 MG
550 TABLET ORAL DAILY
COMMUNITY
End: 2019-08-19 | Stop reason: ALTCHOICE

## 2019-08-14 RX ORDER — FINASTERIDE 5 MG/1
5 TABLET, FILM COATED ORAL DAILY
COMMUNITY
Start: 2016-12-01 | End: 2019-08-19 | Stop reason: ALTCHOICE

## 2019-08-14 RX ORDER — OXCARBAZEPINE 150 MG/1
300 TABLET, FILM COATED ORAL DAILY
COMMUNITY
Start: 2018-04-26 | End: 2019-12-23

## 2019-08-14 RX ORDER — BICALUTAMIDE 50 MG/1
50 TABLET, FILM COATED ORAL DAILY
COMMUNITY
Start: 2018-07-05 | End: 2019-08-19 | Stop reason: ALTCHOICE

## 2019-08-15 ENCOUNTER — OFFICE VISIT (OUTPATIENT)
Dept: CARDIOLOGY | Age: 79
End: 2019-08-15

## 2019-08-15 VITALS
DIASTOLIC BLOOD PRESSURE: 66 MMHG | BODY MASS INDEX: 40.77 KG/M2 | WEIGHT: 269 LBS | OXYGEN SATURATION: 93 % | SYSTOLIC BLOOD PRESSURE: 106 MMHG | HEART RATE: 63 BPM | HEIGHT: 68 IN

## 2019-08-15 DIAGNOSIS — R06.09 DYSPNEA ON EXERTION: Primary | ICD-10-CM

## 2019-08-15 PROCEDURE — 3074F SYST BP LT 130 MM HG: CPT | Performed by: NURSE PRACTITIONER

## 2019-08-15 PROCEDURE — 3078F DIAST BP <80 MM HG: CPT | Performed by: NURSE PRACTITIONER

## 2019-08-15 PROCEDURE — 99214 OFFICE O/P EST MOD 30 MIN: CPT | Performed by: NURSE PRACTITIONER

## 2019-08-16 ENCOUNTER — ANCILLARY PROCEDURE (OUTPATIENT)
Dept: CARDIOLOGY | Age: 79
End: 2019-08-16
Attending: INTERNAL MEDICINE

## 2019-08-16 ENCOUNTER — TELEPHONE (OUTPATIENT)
Dept: CARDIOLOGY | Age: 79
End: 2019-08-16

## 2019-08-16 VITALS — HEIGHT: 68 IN | BODY MASS INDEX: 41.22 KG/M2 | WEIGHT: 272 LBS

## 2019-08-16 DIAGNOSIS — R06.09 DYSPNEA ON EXERTION: ICD-10-CM

## 2019-08-16 LAB
LV EF: 69 %
STRESS POST EXERCISE DUR MIN: 0 MIN
STRESS POST EXERCISE DUR SEC: 59 SEC
STRESS TARGET HR: 144 BPM

## 2019-08-16 PROCEDURE — 93015 CV STRESS TEST SUPVJ I&R: CPT | Performed by: INTERNAL MEDICINE

## 2019-08-16 PROCEDURE — 78452 HT MUSCLE IMAGE SPECT MULT: CPT | Performed by: INTERNAL MEDICINE

## 2019-08-16 PROCEDURE — A9502 TC99M TETROFOSMIN: HCPCS | Performed by: INTERNAL MEDICINE

## 2019-08-16 RX ORDER — REGADENOSON 0.08 MG/ML
0.4 INJECTION, SOLUTION INTRAVENOUS ONCE
Status: COMPLETED | OUTPATIENT
Start: 2019-08-16 | End: 2019-08-16

## 2019-08-16 RX ADMIN — REGADENOSON 0.4 MG: 0.08 INJECTION, SOLUTION INTRAVENOUS at 09:55

## 2019-08-16 ASSESSMENT — EXERCISE STRESS TEST
PEAK_RPP: 10530
PEAK_RPP: 10912
PEAK_HR: 81
STAGE_CATEGORIES: RESTING
PEAK_RPP: 11220
STRESS_SYMPTOMS: DYSPNEA
PEAK_RPP: 11050
STOPPAGE_REASON: PROTOCOL COMPLETE
COMMENTS: 30 SECOND RECOVERY
PEAK_BP: 130/80
PEAK_BP: 124/62
STAGE_CATEGORIES: 1
PEAK_BP: 130/78
PEAK_BP: 130/82
PEAK_HR: 88
PEAK_HR: 85
STAGE_CATEGORIES: RECOVERY 0
PEAK_RPP: 8710
PEAK_HR: 67
STAGE_CATEGORIES: RECOVERY 1
COMMENTS: 4 MINUTE RECOVERY
PEAK_HR: 85
PEAK_BP: 132/70
STAGE_CATEGORIES: RECOVERY 2
COMMENTS: 2 MINUTE RECOVERY

## 2019-08-19 ENCOUNTER — OFFICE VISIT (OUTPATIENT)
Dept: CARDIOLOGY | Age: 79
End: 2019-08-19

## 2019-08-19 ENCOUNTER — APPOINTMENT (OUTPATIENT)
Dept: CARDIOLOGY | Age: 79
End: 2019-08-19

## 2019-08-19 VITALS
HEART RATE: 58 BPM | WEIGHT: 275 LBS | DIASTOLIC BLOOD PRESSURE: 72 MMHG | HEIGHT: 68 IN | BODY MASS INDEX: 41.68 KG/M2 | SYSTOLIC BLOOD PRESSURE: 140 MMHG | OXYGEN SATURATION: 96 %

## 2019-08-19 DIAGNOSIS — I10 ESSENTIAL HYPERTENSION: ICD-10-CM

## 2019-08-19 DIAGNOSIS — E78.00 HYPERCHOLESTEREMIA: ICD-10-CM

## 2019-08-19 DIAGNOSIS — R06.09 DYSPNEA ON EXERTION: Primary | ICD-10-CM

## 2019-08-19 PROCEDURE — 3078F DIAST BP <80 MM HG: CPT | Performed by: INTERNAL MEDICINE

## 2019-08-19 PROCEDURE — 3077F SYST BP >= 140 MM HG: CPT | Performed by: INTERNAL MEDICINE

## 2019-08-19 PROCEDURE — 99214 OFFICE O/P EST MOD 30 MIN: CPT | Performed by: INTERNAL MEDICINE

## 2019-08-19 RX ORDER — FUROSEMIDE 40 MG/1
40 TABLET ORAL DAILY
COMMUNITY
End: 2019-10-07 | Stop reason: SDUPTHER

## 2019-08-19 RX ORDER — POTASSIUM CHLORIDE 20 MEQ/1
20 TABLET, EXTENDED RELEASE ORAL DAILY
COMMUNITY
End: 2019-10-07 | Stop reason: SDUPTHER

## 2019-08-30 ENCOUNTER — APPOINTMENT (OUTPATIENT)
Dept: CT IMAGING | Facility: HOSPITAL | Age: 79
End: 2019-08-30
Attending: EMERGENCY MEDICINE
Payer: MEDICARE

## 2019-08-30 ENCOUNTER — HOSPITAL ENCOUNTER (EMERGENCY)
Facility: HOSPITAL | Age: 79
Discharge: HOME | End: 2019-08-30
Attending: EMERGENCY MEDICINE
Payer: MEDICARE

## 2019-08-30 VITALS
HEIGHT: 68 IN | WEIGHT: 263 LBS | OXYGEN SATURATION: 97 % | SYSTOLIC BLOOD PRESSURE: 127 MMHG | RESPIRATION RATE: 16 BRPM | DIASTOLIC BLOOD PRESSURE: 69 MMHG | BODY MASS INDEX: 39.86 KG/M2 | TEMPERATURE: 98 F | HEART RATE: 68 BPM

## 2019-08-30 DIAGNOSIS — R31.9 HEMATURIA, UNSPECIFIED TYPE: Primary | ICD-10-CM

## 2019-08-30 LAB
ABSOLUTE IMMATURE GRANULOCYTES (OFFPRE24): NORMAL
ALBUMIN SERPL-MCNC: NORMAL G/DL
ALBUMIN/GLOB SERPL: NORMAL {RATIO}
ALP SERPL-CCNC: NORMAL U/L
ALT SERPL-CCNC: NORMAL U/L
ANION GAP SERPL CALC-SCNC: 6 MMOL/L
ANION GAP SERPL CALC-SCNC: 6 MMOL/L (ref 0–18)
AST SERPL-CCNC: NORMAL U/L
BASO+EOS+MONOS # BLD: NORMAL 10*3/UL
BASO+EOS+MONOS NFR BLD: NORMAL %
BASOPHILS # BLD AUTO: 0.06 X10(3) UL (ref 0–0.2)
BASOPHILS # BLD: NORMAL 10*3/UL
BASOPHILS NFR BLD AUTO: 0.8 %
BASOPHILS NFR BLD: NORMAL %
BILIRUB SERPL-MCNC: NORMAL MG/DL
BILIRUB UR QL: NEGATIVE
BUN BLD-MCNC: 27 MG/DL (ref 7–18)
BUN SERPL-MCNC: 27 MG/DL
BUN/CREAT SERPL: 16.8 (ref 10–20)
BUN/CREAT SERPL: NORMAL
CALCIUM BLD-MCNC: 8.8 MG/DL (ref 8.5–10.1)
CALCIUM SERPL-MCNC: 8.8 MG/DL
CHLORIDE SERPL-SCNC: 106 MMOL/L
CHLORIDE SERPL-SCNC: 106 MMOL/L (ref 98–112)
CLARITY UR: CLEAR
CO2 SERPL-SCNC: 28 MMOL/L
CO2 SERPL-SCNC: 28 MMOL/L (ref 21–32)
COLOR UR: YELLOW
CREAT BLD-MCNC: 1.61 MG/DL (ref 0.7–1.3)
CREAT SERPL-MCNC: 1.61 MG/DL
DEPRECATED RDW RBC AUTO: 45 FL (ref 35.1–46.3)
DIFFERENTIAL METHOD BLD: NORMAL
EOSINOPHIL # BLD AUTO: 0.23 X10(3) UL (ref 0–0.7)
EOSINOPHIL # BLD: NORMAL 10*3/UL
EOSINOPHIL NFR BLD AUTO: 3.2 %
EOSINOPHIL NFR BLD: NORMAL %
ERYTHROCYTE [DISTWIDTH] IN BLOOD BY AUTOMATED COUNT: 12.5 % (ref 11–15)
ERYTHROCYTE [DISTWIDTH] IN BLOOD: NORMAL %
GLOBULIN SER-MCNC: NORMAL G/DL
GLUCOSE BLD-MCNC: 88 MG/DL (ref 70–99)
GLUCOSE SERPL-MCNC: 88 MG/DL
GLUCOSE UR-MCNC: NEGATIVE MG/DL
HCT VFR BLD AUTO: 46.5 % (ref 39–53)
HCT VFR BLD CALC: 46.5 %
HGB BLD-MCNC: 15.7 G/DL
HGB BLD-MCNC: 15.7 G/DL (ref 13–17.5)
IMM GRANULOCYTES # BLD AUTO: 0.02 X10(3) UL (ref 0–1)
IMM GRANULOCYTES NFR BLD: 0.3 %
IMMATURE GRANULOCYTES (OFFPRE25): NORMAL
LENGTH OF FAST TIME PATIENT: NORMAL H
LEUKOCYTE ESTERASE UR QL STRIP.AUTO: NEGATIVE
LYMPHOCYTES # BLD AUTO: 0.74 X10(3) UL (ref 1–4)
LYMPHOCYTES # BLD: NORMAL 10*3/UL
LYMPHOCYTES NFR BLD AUTO: 10.5 %
LYMPHOCYTES NFR BLD: NORMAL %
MCH RBC QN AUTO: 33.2 PG
MCH RBC QN AUTO: 33.2 PG (ref 26–34)
MCHC RBC AUTO-ENTMCNC: 33.8 G/DL
MCHC RBC AUTO-ENTMCNC: 33.8 G/DL (ref 31–37)
MCV RBC AUTO: 98.3 FL
MCV RBC AUTO: 98.3 FL (ref 80–100)
MONOCYTES # BLD AUTO: 0.82 X10(3) UL (ref 0.1–1)
MONOCYTES # BLD: NORMAL 10*3/UL
MONOCYTES NFR BLD AUTO: 11.6 %
MONOCYTES NFR BLD: NORMAL %
MPV (OFFPRE2): NORMAL
NEUTROPHILS # BLD AUTO: 5.21 X10 (3) UL (ref 1.5–7.7)
NEUTROPHILS # BLD AUTO: 5.21 X10(3) UL (ref 1.5–7.7)
NEUTROPHILS # BLD: NORMAL 10*3/UL
NEUTROPHILS NFR BLD AUTO: 73.6 %
NEUTROPHILS NFR BLD: NORMAL %
NITRITE UR QL STRIP.AUTO: NEGATIVE
NRBC BLD MANUAL-RTO: NORMAL %
OSMOLALITY SERPL CALC.SUM OF ELEC: 295 MOSM/KG (ref 275–295)
PH UR: 6 [PH] (ref 5–8)
PLAT MORPH BLD: NORMAL
PLATELET # BLD AUTO: 214 10(3)UL (ref 150–450)
PLATELET # BLD: 214 10*3/UL
POTASSIUM SERPL-SCNC: 4.3 MMOL/L
POTASSIUM SERPL-SCNC: 4.3 MMOL/L (ref 3.5–5.1)
PROT SERPL-MCNC: NORMAL G/DL
PROT UR-MCNC: NEGATIVE MG/DL
RBC # BLD AUTO: 4.73 X10(6)UL (ref 3.8–5.8)
RBC # BLD: 4.73 10*6/UL
RBC #/AREA URNS AUTO: 35 /HPF
RBC MORPH BLD: NORMAL
SODIUM SERPL-SCNC: 140 MMOL/L
SODIUM SERPL-SCNC: 140 MMOL/L (ref 136–145)
SP GR UR STRIP: 1.01 (ref 1–1.03)
UROBILINOGEN UR STRIP-ACNC: <2
VIT C UR-MCNC: 20 MG/DL
WBC # BLD AUTO: 7.1 X10(3) UL (ref 4–11)
WBC # BLD: 7.1 10*3/UL
WBC #/AREA URNS AUTO: 1 /HPF
WBC MORPH BLD: NORMAL

## 2019-08-30 PROCEDURE — 99284 EMERGENCY DEPT VISIT MOD MDM: CPT

## 2019-08-30 PROCEDURE — 36415 COLL VENOUS BLD VENIPUNCTURE: CPT

## 2019-08-30 PROCEDURE — 85025 COMPLETE CBC W/AUTO DIFF WBC: CPT | Performed by: EMERGENCY MEDICINE

## 2019-08-30 PROCEDURE — 74178 CT ABD&PLV WO CNTR FLWD CNTR: CPT | Performed by: EMERGENCY MEDICINE

## 2019-08-30 PROCEDURE — 80048 BASIC METABOLIC PNL TOTAL CA: CPT | Performed by: EMERGENCY MEDICINE

## 2019-08-30 PROCEDURE — 81001 URINALYSIS AUTO W/SCOPE: CPT | Performed by: EMERGENCY MEDICINE

## 2019-08-30 RX ORDER — OMEPRAZOLE 20 MG/1
40 CAPSULE, DELAYED RELEASE ORAL
COMMUNITY
End: 2020-08-07 | Stop reason: ALTCHOICE

## 2019-08-30 RX ORDER — ARIPIPRAZOLE 5 MG/1
5 TABLET ORAL DAILY
COMMUNITY
End: 2020-09-28

## 2019-08-31 NOTE — ED PROVIDER NOTES
Patient Seen in: [facility] AND [facility] Emergency Department    History   Patient presents with:  Hematuria    Stated Complaint: hematuria    HPI    82-year-old male with history of bipolar affective disorder, prostate cancer status post radiation, diverticul for pain, discharge and redness. Respiratory: Negative for cough, shortness of breath and wheezing. Cardiovascular: Negative for chest pain. Gastrointestinal: Negative for abdominal pain, diarrhea, nausea and vomiting.    Genitourinary: Positive for b/l, normal gait, no facial asymmetry, normal speech     Skin: Skin is warm and dry. No rash noted. He is not diaphoretic. Psychiatric: He has a normal mood and affect. Nursing note and vitals reviewed.           ED Course     Pulse Oximeter:  Pulse oxi uL    Monocyte Absolute 0.82 0.10 - 1.00 x10(3) uL    Eosinophil Absolute 0.23 0.00 - 0.70 x10(3) uL    Basophil Absolute 0.06 0.00 - 0.20 x10(3) uL    Immature Granulocyte Absolute 0.02 0.00 - 1.00 x10(3) uL    Neutrophil % 73.6 %    Lymphocyte % 10.5 % with hematuria  - I personally reviewed and interpreted all the ED vitals  - afebrile, hemodynamically stable  - I ordered and personally reviewed the labs and imaging and found no leukocytosis, no anemia, GINNY, electrolytes otherwise reassuring, CT without

## 2019-08-31 NOTE — ED NOTES
Pt given urine cup and asked to give sample if he feels the need to use the restroom while he is in the waiting room. Instructed to bring sample to triage desk when finished or to come up to the desk for any other needs.  Educated on location of restroom an

## 2019-09-03 RX ORDER — FUROSEMIDE 40 MG/1
40 TABLET ORAL DAILY
Qty: 30 TABLET | Refills: 0 | Status: SHIPPED | OUTPATIENT
Start: 2019-09-03 | End: 2019-10-07 | Stop reason: SDUPTHER

## 2019-09-03 RX ORDER — FUROSEMIDE 40 MG/1
40 TABLET ORAL DAILY
Qty: 30 TABLET | Refills: 0 | Status: CANCELLED | OUTPATIENT
Start: 2019-09-03

## 2019-09-03 RX ORDER — POTASSIUM CHLORIDE 20 MEQ/1
20 TABLET, EXTENDED RELEASE ORAL DAILY
Qty: 30 TABLET | Refills: 0 | Status: CANCELLED | OUTPATIENT
Start: 2019-09-03

## 2019-09-03 RX ORDER — POTASSIUM CHLORIDE 20 MEQ/1
20 TABLET, EXTENDED RELEASE ORAL DAILY
Qty: 30 TABLET | Refills: 0 | Status: SHIPPED | OUTPATIENT
Start: 2019-09-03 | End: 2019-10-07 | Stop reason: SDUPTHER

## 2019-09-24 PROCEDURE — 88305 TISSUE EXAM BY PATHOLOGIST: CPT | Performed by: INTERNAL MEDICINE

## 2019-10-04 ENCOUNTER — HOSPITAL ENCOUNTER (OUTPATIENT)
Dept: GENERAL RADIOLOGY | Age: 79
Discharge: HOME | End: 2019-10-04
Attending: FAMILY MEDICINE
Payer: MEDICARE

## 2019-10-04 DIAGNOSIS — R91.8 OPACITIES OF BOTH LUNGS PRESENT ON CHEST X-RAY: ICD-10-CM

## 2019-10-04 PROCEDURE — 71046 X-RAY EXAM CHEST 2 VIEWS: CPT | Performed by: FAMILY MEDICINE

## 2019-10-07 RX ORDER — POTASSIUM CHLORIDE 20 MEQ/1
20 TABLET, EXTENDED RELEASE ORAL DAILY
Qty: 30 TABLET | Refills: 0 | Status: SHIPPED | OUTPATIENT
Start: 2019-10-07 | End: 2019-11-13 | Stop reason: SDUPTHER

## 2019-10-07 RX ORDER — FUROSEMIDE 40 MG/1
40 TABLET ORAL DAILY
Qty: 30 TABLET | Refills: 0 | Status: SHIPPED | OUTPATIENT
Start: 2019-10-07 | End: 2019-11-13 | Stop reason: SDUPTHER

## 2019-10-21 ENCOUNTER — TELEPHONE (OUTPATIENT)
Dept: CARDIOLOGY | Age: 79
End: 2019-10-21

## 2019-11-13 RX ORDER — POTASSIUM CHLORIDE 20 MEQ/1
20 TABLET, EXTENDED RELEASE ORAL DAILY
Qty: 30 TABLET | Refills: 0 | Status: SHIPPED | OUTPATIENT
Start: 2019-11-13 | End: 2019-12-16 | Stop reason: SDUPTHER

## 2019-11-13 RX ORDER — FUROSEMIDE 40 MG/1
40 TABLET ORAL DAILY
Qty: 30 TABLET | Refills: 0 | Status: SHIPPED | OUTPATIENT
Start: 2019-11-13 | End: 2019-12-16 | Stop reason: SDUPTHER

## 2019-11-22 ENCOUNTER — TELEPHONE (OUTPATIENT)
Dept: CARDIOLOGY | Age: 79
End: 2019-11-22

## 2019-12-16 RX ORDER — POTASSIUM CHLORIDE 20 MEQ/1
20 TABLET, EXTENDED RELEASE ORAL DAILY
Qty: 30 TABLET | Refills: 0 | Status: SHIPPED | OUTPATIENT
Start: 2019-12-16 | End: 2020-01-15 | Stop reason: SDUPTHER

## 2019-12-16 RX ORDER — FUROSEMIDE 40 MG/1
40 TABLET ORAL DAILY
Qty: 30 TABLET | Refills: 0 | Status: SHIPPED | OUTPATIENT
Start: 2019-12-16 | End: 2020-01-15 | Stop reason: SDUPTHER

## 2019-12-19 RX ORDER — MESALAMINE 1000 MG/1
1000 SUPPOSITORY RECTAL DAILY
Refills: 3 | COMMUNITY
Start: 2019-12-03 | End: 2020-11-25

## 2019-12-19 RX ORDER — ARIPIPRAZOLE 5 MG/1
5 TABLET ORAL DAILY
COMMUNITY
End: 2020-11-04

## 2019-12-19 RX ORDER — BICALUTAMIDE 50 MG/1
50 TABLET, FILM COATED ORAL DAILY
COMMUNITY
Start: 2017-11-17 | End: 2019-12-23 | Stop reason: ALTCHOICE

## 2019-12-19 RX ORDER — FINASTERIDE 5 MG/1
5 TABLET, FILM COATED ORAL DAILY
COMMUNITY
Start: 2019-09-09 | End: 2019-12-23 | Stop reason: ALTCHOICE

## 2019-12-23 ENCOUNTER — OFFICE VISIT (OUTPATIENT)
Dept: CARDIOLOGY | Age: 79
End: 2019-12-23

## 2019-12-23 VITALS
DIASTOLIC BLOOD PRESSURE: 78 MMHG | HEART RATE: 82 BPM | OXYGEN SATURATION: 97 % | HEIGHT: 68 IN | BODY MASS INDEX: 41.68 KG/M2 | SYSTOLIC BLOOD PRESSURE: 140 MMHG | WEIGHT: 275 LBS

## 2019-12-23 DIAGNOSIS — I63.9 CEREBROVASCULAR ACCIDENT (CVA), UNSPECIFIED MECHANISM (CMD): ICD-10-CM

## 2019-12-23 DIAGNOSIS — I10 ESSENTIAL HYPERTENSION: Primary | ICD-10-CM

## 2019-12-23 DIAGNOSIS — E78.00 HYPERCHOLESTEREMIA: ICD-10-CM

## 2019-12-23 PROCEDURE — 99214 OFFICE O/P EST MOD 30 MIN: CPT | Performed by: INTERNAL MEDICINE

## 2019-12-23 PROCEDURE — 3077F SYST BP >= 140 MM HG: CPT | Performed by: INTERNAL MEDICINE

## 2019-12-23 PROCEDURE — 3078F DIAST BP <80 MM HG: CPT | Performed by: INTERNAL MEDICINE

## 2019-12-23 RX ORDER — AMLODIPINE BESYLATE 10 MG/1
10 TABLET ORAL DAILY
Qty: 90 TABLET | Refills: 1 | Status: SHIPPED | OUTPATIENT
Start: 2019-12-23 | End: 2020-06-24 | Stop reason: ALTCHOICE

## 2019-12-23 SDOH — HEALTH STABILITY: MENTAL HEALTH: HOW MANY STANDARD DRINKS CONTAINING ALCOHOL DO YOU HAVE ON A TYPICAL DAY?: 1 OR 2

## 2019-12-23 SDOH — HEALTH STABILITY: MENTAL HEALTH: HOW OFTEN DO YOU HAVE A DRINK CONTAINING ALCOHOL?: MONTHLY OR LESS

## 2019-12-23 ASSESSMENT — PATIENT HEALTH QUESTIONNAIRE - PHQ9
2. FEELING DOWN, DEPRESSED OR HOPELESS: SEVERAL DAYS
1. LITTLE INTEREST OR PLEASURE IN DOING THINGS: SEVERAL DAYS
SUM OF ALL RESPONSES TO PHQ9 QUESTIONS 1 AND 2: 2
SUM OF ALL RESPONSES TO PHQ9 QUESTIONS 1 AND 2: 2

## 2019-12-24 ENCOUNTER — TELEPHONE (OUTPATIENT)
Dept: CARDIOLOGY | Age: 79
End: 2019-12-24

## 2020-01-15 RX ORDER — FUROSEMIDE 40 MG/1
40 TABLET ORAL DAILY
Qty: 30 TABLET | Refills: 0 | Status: SHIPPED | OUTPATIENT
Start: 2020-01-15 | End: 2020-02-14 | Stop reason: SDUPTHER

## 2020-01-15 RX ORDER — POTASSIUM CHLORIDE 20 MEQ/1
20 TABLET, EXTENDED RELEASE ORAL DAILY
Qty: 30 TABLET | Refills: 0 | Status: SHIPPED | OUTPATIENT
Start: 2020-01-15 | End: 2020-02-14 | Stop reason: SDUPTHER

## 2020-02-14 RX ORDER — FUROSEMIDE 40 MG/1
40 TABLET ORAL DAILY
Qty: 30 TABLET | Refills: 3 | Status: SHIPPED | OUTPATIENT
Start: 2020-02-14 | End: 2020-06-24 | Stop reason: ALTCHOICE

## 2020-02-14 RX ORDER — POTASSIUM CHLORIDE 20 MEQ/1
20 TABLET, EXTENDED RELEASE ORAL DAILY
Qty: 30 TABLET | Refills: 3 | Status: SHIPPED | OUTPATIENT
Start: 2020-02-14 | End: 2020-06-24

## 2020-06-08 ENCOUNTER — HOSPITAL ENCOUNTER (OUTPATIENT)
Age: 80
Discharge: HOME | End: 2020-06-08
Attending: EMERGENCY MEDICINE
Payer: MEDICARE

## 2020-06-08 VITALS
OXYGEN SATURATION: 99 % | TEMPERATURE: 98 F | DIASTOLIC BLOOD PRESSURE: 76 MMHG | HEART RATE: 75 BPM | RESPIRATION RATE: 18 BRPM | SYSTOLIC BLOOD PRESSURE: 128 MMHG

## 2020-06-08 DIAGNOSIS — B02.9 HERPES ZOSTER WITHOUT COMPLICATION: Primary | ICD-10-CM

## 2020-06-08 PROCEDURE — 99212 OFFICE O/P EST SF 10 MIN: CPT

## 2020-06-08 NOTE — ED PROVIDER NOTES
Patient Seen in: [facility]      History   Patient presents with:  Derm Problem    Stated Complaint: RASH    HPI   wife noticed a rash to his left buttock 3-4 days ago. Some itching and some mild discomfort.  No other comp MDM                   Disposition and Plan     Clinical Impression:  Herpes zoster without complication  (primary encounter diagnosis)    Disposition:  Discharge  6/8/2020  1:34 pm    Follow-up:  Onur Vivas MD  [address] [place] [address]

## 2020-06-24 ENCOUNTER — OFFICE VISIT (OUTPATIENT)
Dept: CARDIOLOGY | Age: 80
End: 2020-06-24

## 2020-06-24 ENCOUNTER — LAB ENCOUNTER (OUTPATIENT)
Dept: LAB | Age: 80
End: 2020-06-24
Attending: INTERNAL MEDICINE
Payer: MEDICARE

## 2020-06-24 VITALS
OXYGEN SATURATION: 95 % | SYSTOLIC BLOOD PRESSURE: 112 MMHG | HEIGHT: 68 IN | WEIGHT: 276 LBS | BODY MASS INDEX: 41.83 KG/M2 | HEART RATE: 93 BPM | DIASTOLIC BLOOD PRESSURE: 70 MMHG

## 2020-06-24 DIAGNOSIS — R06.09 OTHER FORM OF DYSPNEA: Primary | ICD-10-CM

## 2020-06-24 DIAGNOSIS — E78.00 HYPERCHOLESTEROLEMIA: ICD-10-CM

## 2020-06-24 DIAGNOSIS — E78.00 HYPERCHOLESTEREMIA: ICD-10-CM

## 2020-06-24 DIAGNOSIS — I10 ESSENTIAL HYPERTENSION, MALIGNANT: ICD-10-CM

## 2020-06-24 DIAGNOSIS — R06.09 DYSPNEA ON EXERTION: Primary | ICD-10-CM

## 2020-06-24 DIAGNOSIS — I10 ESSENTIAL HYPERTENSION: ICD-10-CM

## 2020-06-24 LAB
BUN SERPL-MCNC: 39 MG/DL
CALCIUM SERPL-MCNC: 8.6 MG/DL
CHLORIDE SERPL-SCNC: 103 MMOL/L
CREAT SERPL-MCNC: 1.65 MG/DL
GLUCOSE SERPL-MCNC: 132 MG/DL
POTASSIUM SERPL-SCNC: 3.5 MMOL/L
SODIUM SERPL-SCNC: 139 MMOL/L

## 2020-06-24 PROCEDURE — 80048 BASIC METABOLIC PNL TOTAL CA: CPT

## 2020-06-24 PROCEDURE — 3074F SYST BP LT 130 MM HG: CPT | Performed by: INTERNAL MEDICINE

## 2020-06-24 PROCEDURE — 3078F DIAST BP <80 MM HG: CPT | Performed by: INTERNAL MEDICINE

## 2020-06-24 PROCEDURE — 99214 OFFICE O/P EST MOD 30 MIN: CPT | Performed by: INTERNAL MEDICINE

## 2020-06-24 PROCEDURE — 36415 COLL VENOUS BLD VENIPUNCTURE: CPT

## 2020-06-24 RX ORDER — FUROSEMIDE 20 MG/1
20 TABLET ORAL DAILY
Qty: 90 TABLET | Refills: 1 | Status: SHIPPED | OUTPATIENT
Start: 2020-06-24 | End: 2020-11-04

## 2020-06-24 RX ORDER — POTASSIUM CHLORIDE 750 MG/1
10 CAPSULE, EXTENDED RELEASE ORAL DAILY
Qty: 90 CAPSULE | Refills: 1 | Status: SHIPPED | OUTPATIENT
Start: 2020-06-24 | End: 2020-11-04 | Stop reason: CLARIF

## 2020-06-24 RX ORDER — METOLAZONE 5 MG/1
5 TABLET ORAL EVERY OTHER DAY
COMMUNITY

## 2020-06-24 SDOH — HEALTH STABILITY: MENTAL HEALTH: HOW MANY STANDARD DRINKS CONTAINING ALCOHOL DO YOU HAVE ON A TYPICAL DAY?: 1 OR 2

## 2020-06-24 SDOH — HEALTH STABILITY: MENTAL HEALTH: HOW OFTEN DO YOU HAVE A DRINK CONTAINING ALCOHOL?: MONTHLY OR LESS

## 2020-06-24 ASSESSMENT — PATIENT HEALTH QUESTIONNAIRE - PHQ9
1. LITTLE INTEREST OR PLEASURE IN DOING THINGS: NOT AT ALL
CLINICAL INTERPRETATION OF PHQ2 SCORE: NO FURTHER SCREENING NEEDED
SUM OF ALL RESPONSES TO PHQ9 QUESTIONS 1 AND 2: 0
CLINICAL INTERPRETATION OF PHQ9 SCORE: NO FURTHER SCREENING NEEDED
2. FEELING DOWN, DEPRESSED OR HOPELESS: NOT AT ALL
SUM OF ALL RESPONSES TO PHQ9 QUESTIONS 1 AND 2: 0

## 2020-06-25 ENCOUNTER — CLINICAL ABSTRACT (OUTPATIENT)
Dept: CARDIOLOGY | Age: 80
End: 2020-06-25

## 2020-06-25 ENCOUNTER — TELEPHONE (OUTPATIENT)
Dept: CARDIOLOGY | Age: 80
End: 2020-06-25

## 2020-07-06 ENCOUNTER — TELEPHONE (OUTPATIENT)
Dept: CARDIOLOGY | Age: 80
End: 2020-07-06

## 2020-07-24 DIAGNOSIS — E78.00 PURE HYPERCHOLESTEROLEMIA: Primary | ICD-10-CM

## 2020-08-07 ENCOUNTER — OFFICE VISIT (OUTPATIENT)
Dept: SURGERY | Facility: CLINIC | Age: 80
End: 2020-08-07
Payer: COMMERCIAL

## 2020-08-07 VITALS
BODY MASS INDEX: 42.03 KG/M2 | SYSTOLIC BLOOD PRESSURE: 96 MMHG | HEART RATE: 94 BPM | OXYGEN SATURATION: 99 % | HEIGHT: 68 IN | WEIGHT: 277.31 LBS | DIASTOLIC BLOOD PRESSURE: 61 MMHG

## 2020-08-07 DIAGNOSIS — R63.5 WEIGHT GAIN: ICD-10-CM

## 2020-08-07 DIAGNOSIS — Z86.73 HISTORY OF STROKE: ICD-10-CM

## 2020-08-07 DIAGNOSIS — Z99.89 OSA ON CPAP: ICD-10-CM

## 2020-08-07 DIAGNOSIS — N18.9 CHRONIC KIDNEY DISEASE, UNSPECIFIED CKD STAGE: ICD-10-CM

## 2020-08-07 DIAGNOSIS — E78.00 HIGH CHOLESTEROL: ICD-10-CM

## 2020-08-07 DIAGNOSIS — I10 HYPERTENSION, UNSPECIFIED TYPE: Primary | ICD-10-CM

## 2020-08-07 DIAGNOSIS — E66.01 MORBID OBESITY WITH BMI OF 40.0-44.9, ADULT (HCC): ICD-10-CM

## 2020-08-07 DIAGNOSIS — G47.33 OSA ON CPAP: ICD-10-CM

## 2020-08-07 DIAGNOSIS — Z86.59 HISTORY OF DEPRESSION: ICD-10-CM

## 2020-08-07 PROCEDURE — 3008F BODY MASS INDEX DOCD: CPT | Performed by: NURSE PRACTITIONER

## 2020-08-07 PROCEDURE — 3078F DIAST BP <80 MM HG: CPT | Performed by: NURSE PRACTITIONER

## 2020-08-07 PROCEDURE — 3074F SYST BP LT 130 MM HG: CPT | Performed by: NURSE PRACTITIONER

## 2020-08-07 PROCEDURE — 99204 OFFICE O/P NEW MOD 45 MIN: CPT | Performed by: NURSE PRACTITIONER

## 2020-08-07 NOTE — PATIENT INSTRUCTIONS
Values: Mobility, Healthy weight, Healthy appearance.     Breakfast:     2 eggs, scramble with tomatoes, green peppers, onions  Fruit (apple, orange, berries) with a handful of walnuts  Plain greek yogurt, berries, flaxseed 1 tbsp    Lunch:    Salad- turkey

## 2020-08-07 NOTE — PROGRESS NOTES
[facility] Consultation Note       Date of Consult:  2020    Patient:  Edilberto Zuleta  :      10/21/1940  MRN:      [id number]    Referring Provider: Self     Chief Complaint:  Weight management    SUBJECTIVE     History of Present • apixaban 5 MG Oral Tab Take 5 mg by mouth 2 (two) times daily. • furosemide 20 MG Oral Tab Take 20 mg by mouth 2 (two) times daily. • metolazone 5 MG Oral Tab Take 5 mg by mouth daily. • Potassium (POTASSIMIN OR) Take 10 mg by mouth daily. Physical activity:        Days per week: Not on file        Minutes per session: Not on file      Stress: Not on file    Relationships      Social connections:        Talks on phone: Not on file        Gets together: Not on file        Attends [employer] Calorie-controlled diet: per RD, Limit carbohydrates to per RD  gms per day, Eat 100-200 calories within 1 hour of waking up and Eat 3-4 cups of fresh fruit or vegetables daily    Behavior Modifications Reviewed and Discussed:    Eat breakfast, Eat 3 me Diagnosis(ses):   Hypertension, unspecified type  (primary encounter diagnosis)  Weight gain  High cholesterol  Morbid obesity with BMI of 40.0-44.9, adult (HCC)  Chronic kidney disease, unspecified CKD stage  ESTHER on CPAP  History of depression  History of rich in fruits, vegetables, whole grains and healthy fats, and meats/seafood without hormones, steroids, or antibiotics. Avoid processed, poor quality carbohydrates, refined grains, flour, sugar. Goals for next month:  1.  Keep a food log, aim for 10

## 2020-08-27 DIAGNOSIS — N18.30 STAGE 3 CHRONIC KIDNEY DISEASE (HCC): Primary | ICD-10-CM

## 2020-08-27 DIAGNOSIS — E66.01 MORBID OBESITY WITH BMI OF 40.0-44.9, ADULT (HCC): ICD-10-CM

## 2020-09-02 ENCOUNTER — OFFICE VISIT (OUTPATIENT)
Dept: SURGERY | Facility: CLINIC | Age: 80
End: 2020-09-02
Payer: COMMERCIAL

## 2020-09-02 VITALS — BODY MASS INDEX: 41.36 KG/M2 | WEIGHT: 272.88 LBS | HEIGHT: 68 IN

## 2020-09-02 DIAGNOSIS — N18.30 STAGE 3 CHRONIC KIDNEY DISEASE (HCC): Primary | ICD-10-CM

## 2020-09-02 PROCEDURE — 3008F BODY MASS INDEX DOCD: CPT | Performed by: DIETITIAN, REGISTERED

## 2020-09-02 PROCEDURE — 97802 MEDICAL NUTRITION INDIV IN: CPT | Performed by: DIETITIAN, REGISTERED

## 2020-09-02 NOTE — PROGRESS NOTES
INITIAL OUTPATIENT NUTRITION CONSULTATION    Nutrition Assessment    Medical Diagnosis: Obesity and CKD    Physical Findings: Bilateral LE edema    Client Age and Gender: 70year old male    Marital Status and Occupation: , retired      Meds:  a HDL Cholesterol   Date Value Ref Range Status   08/12/2019 61 (H) 40 - 59 mg/dL Final     Comment:       Interpretive Information:   An HDL cholesterol <40 mg/dL is low and constitutes a coronary heart disease risk factor.  An HDL cholesterol >60 mg/dL nutrition education and evaluation provided for weight loss.  Pt has done well w/ making changes to his diet since his first meeting with Samira Wills- breakfast is more protein-based with greek yogurt or eggs vs a muffin, and pt is drinking less sugar sweetened be

## 2020-09-28 ENCOUNTER — OFFICE VISIT (OUTPATIENT)
Dept: SURGERY | Facility: CLINIC | Age: 80
End: 2020-09-28
Payer: COMMERCIAL

## 2020-09-28 VITALS
HEART RATE: 66 BPM | OXYGEN SATURATION: 98 % | DIASTOLIC BLOOD PRESSURE: 84 MMHG | BODY MASS INDEX: 41.68 KG/M2 | SYSTOLIC BLOOD PRESSURE: 131 MMHG | WEIGHT: 275 LBS | HEIGHT: 68 IN

## 2020-09-28 DIAGNOSIS — Z99.89 OSA ON CPAP: ICD-10-CM

## 2020-09-28 DIAGNOSIS — E66.01 MORBID OBESITY WITH BMI OF 40.0-44.9, ADULT (HCC): ICD-10-CM

## 2020-09-28 DIAGNOSIS — E78.00 HIGH CHOLESTEROL: ICD-10-CM

## 2020-09-28 DIAGNOSIS — N18.9 CHRONIC KIDNEY DISEASE, UNSPECIFIED CKD STAGE: ICD-10-CM

## 2020-09-28 DIAGNOSIS — I10 HYPERTENSION, UNSPECIFIED TYPE: Primary | ICD-10-CM

## 2020-09-28 DIAGNOSIS — Z86.73 HISTORY OF STROKE: ICD-10-CM

## 2020-09-28 DIAGNOSIS — Z86.59 HISTORY OF DEPRESSION: ICD-10-CM

## 2020-09-28 DIAGNOSIS — G47.33 OSA ON CPAP: ICD-10-CM

## 2020-09-28 PROCEDURE — 99213 OFFICE O/P EST LOW 20 MIN: CPT | Performed by: NURSE PRACTITIONER

## 2020-09-28 PROCEDURE — 3079F DIAST BP 80-89 MM HG: CPT | Performed by: NURSE PRACTITIONER

## 2020-09-28 PROCEDURE — 3075F SYST BP GE 130 - 139MM HG: CPT | Performed by: NURSE PRACTITIONER

## 2020-09-28 PROCEDURE — 3008F BODY MASS INDEX DOCD: CPT | Performed by: NURSE PRACTITIONER

## 2020-09-28 NOTE — PATIENT INSTRUCTIONS
Breakfast:  1. Fruit and nuts/seeds. 2. 2 eggs scrambled with vegetables, cottage cheese. 3. Yogurt, 2 tbsp flaxseed, berries. Add 2 tbs ground flaxseed- add to yogurt or salads.     2 fruits/day  4 vegetables/day  Handful of nuts or seeds at 7 PM, t

## 2020-09-28 NOTE — PROGRESS NOTES
[address]  Dept: [phone]       Patient:  Syl Adelita  :      10/21/1940  MRN:      [id number]    Chief Complaint:  Patient present Encounters:  09/28/20 : 275 lb (124.7 kg)  09/02/20 : 272 lb 14.4 oz (123.8 kg)  08/07/20 : 277 lb 4.8 oz (125.8 kg)  07/10/20 : 276 lb (125.2 kg)  12/23/19 : 272 lb 8 oz (123.6 kg)  09/24/19 : 265 lb (120.2 kg)      Patient Medications:    Current Outpati Pack years: 5        Types: Cigars      Smokeless tobacco: Current User    Substance and Sexual Activity      Alcohol use:  Yes        Alcohol/week: 0.0 standard drinks        Comment: Weekends      Drug use: No      Sexual activity: Not on file    Lifestyl If yes, how much?:  monika Leblanc     Food Journal  · Reviewed and Discussed:       · Patient has a Food Journal?: no   · Patient is reading nutrition labels? no  · Average Caloric Intake:     · Average CHO Intake:  · Is patient exercising? stated age, cooperative and obese   Head: Normocephalic, without obvious abnormality, atraumatic  Neck: no adenopathy, no carotid bruit, no JVD, supple, symmetrical, trachea midline and thyroid not enlarged, symmetric, no tenderness/mass/nodules  Lungs: cl the above medications. No interval change in antihypertensive medication.      OBESITY/WEIGHT GAIN:  CKD:     Patient's goal weight: 250 lbs  Values:  Mobility, Healthy weight, Healthy appearance      Recommended patient continue intensive lifestyle and beh

## 2020-10-23 ENCOUNTER — OFFICE VISIT (OUTPATIENT)
Dept: SURGERY | Facility: CLINIC | Age: 80
End: 2020-10-23
Payer: COMMERCIAL

## 2020-10-23 VITALS
DIASTOLIC BLOOD PRESSURE: 79 MMHG | HEIGHT: 68 IN | BODY MASS INDEX: 40.54 KG/M2 | SYSTOLIC BLOOD PRESSURE: 123 MMHG | OXYGEN SATURATION: 98 % | WEIGHT: 267.5 LBS | HEART RATE: 95 BPM

## 2020-10-23 DIAGNOSIS — Z86.73 HISTORY OF STROKE: ICD-10-CM

## 2020-10-23 DIAGNOSIS — E66.01 MORBID OBESITY WITH BMI OF 40.0-44.9, ADULT (HCC): ICD-10-CM

## 2020-10-23 DIAGNOSIS — E78.00 HIGH CHOLESTEROL: ICD-10-CM

## 2020-10-23 DIAGNOSIS — Z99.89 OSA ON CPAP: ICD-10-CM

## 2020-10-23 DIAGNOSIS — I10 HYPERTENSION, UNSPECIFIED TYPE: Primary | ICD-10-CM

## 2020-10-23 DIAGNOSIS — G47.33 OSA ON CPAP: ICD-10-CM

## 2020-10-23 DIAGNOSIS — N18.9 CHRONIC KIDNEY DISEASE, UNSPECIFIED CKD STAGE: ICD-10-CM

## 2020-10-23 DIAGNOSIS — Z86.59 HISTORY OF DEPRESSION: ICD-10-CM

## 2020-10-23 PROCEDURE — 3078F DIAST BP <80 MM HG: CPT | Performed by: NURSE PRACTITIONER

## 2020-10-23 PROCEDURE — 99213 OFFICE O/P EST LOW 20 MIN: CPT | Performed by: NURSE PRACTITIONER

## 2020-10-23 PROCEDURE — 3074F SYST BP LT 130 MM HG: CPT | Performed by: NURSE PRACTITIONER

## 2020-10-23 PROCEDURE — 3008F BODY MASS INDEX DOCD: CPT | Performed by: NURSE PRACTITIONER

## 2020-10-23 RX ORDER — POTASSIUM CHLORIDE 20 MEQ/1
TABLET, EXTENDED RELEASE ORAL
COMMUNITY
End: 2020-11-06

## 2020-10-23 RX ORDER — DULOXETIN HYDROCHLORIDE 60 MG/1
CAPSULE, DELAYED RELEASE ORAL
COMMUNITY
Start: 2020-08-21 | End: 2020-11-06

## 2020-10-23 NOTE — PROGRESS NOTES
[address]  Dept: [phone]       Patient:  Servando Soulier  :      10/21/1940  MRN:      [id number]    Chief Complaint:  Patient present -08   Total weight loss: -10   Start weight: 277    Wt Readings from Last 6 Encounters:  10/23/20 : 267 lb 8 oz (121.3 kg)  09/28/20 : 275 lb (124.7 kg)  09/02/20 : 272 lb 14.4 oz (123.8 kg)  08/07/20 : 277 lb 4.8 oz (125.8 kg)  07/10/20 : 276 lb (125.2 kg Transportation needs        Medical: Not on file        Non-medical: Not on file    Tobacco Use      Smoking status: Former Smoker        Packs/day: 0.50        Years: 10.00        Pack years: 5        Types: Cigars      Smokeless tobacco: Current User antidepressants   Grazing: -  Sweet tooth: +  Crunchy/salty: +  Etoh: 1-2 cocktails on weekends   Soda Drinker: No                     Sports Drinks:  No                  Juice:  Yes                 If yes, how much?:  [employer], lemonade     Food Ferdinand arthralgias and back pain  Neurological: Hx stroke   Behavioral/Psych: positive for depression  Endocrine: negative  All other systems were reviewed and are negative    Physical Exam:   General appearance: alert, appears stated age, cooperative and obese Date/Time    CHOLEST 194 08/12/2019 10:27 AM     (H) 08/12/2019 10:27 AM    HDL 61 (H) 08/12/2019 10:27 AM    TRIG 87 08/12/2019 10:27 AM    VLDL 17 08/12/2019 10:27 AM       HYPERTENSION: Blood pressure stable on the above medications.  No interval Environment discussed today. RTC one month.  Will travel to [address] [place] [address]

## 2020-10-23 NOTE — PATIENT INSTRUCTIONS
Continue exercise 3-5 days/week. Add 2 tbsp flaxseed in daily.     Breakfast:  1. Fruit and nuts/seeds. 2. 2 eggs scrambled with vegetables, cottage cheese. 3. Yogurt, 2 tbsp flaxseed, berries.     Add 2 tbs ground flaxseed- add to yogurt or salad

## 2020-11-02 ENCOUNTER — TELEPHONE (OUTPATIENT)
Dept: CARDIOLOGY | Age: 80
End: 2020-11-02

## 2020-11-02 RX ORDER — LURASIDONE HYDROCHLORIDE 40 MG/1
40 TABLET, FILM COATED ORAL
COMMUNITY

## 2020-11-04 ENCOUNTER — LAB ENCOUNTER (OUTPATIENT)
Dept: LAB | Facility: HOSPITAL | Age: 80
End: 2020-11-04
Attending: NURSE PRACTITIONER
Payer: MEDICARE

## 2020-11-04 ENCOUNTER — OFFICE VISIT (OUTPATIENT)
Dept: CARDIOLOGY | Age: 80
End: 2020-11-04

## 2020-11-04 ENCOUNTER — TELEPHONE (OUTPATIENT)
Dept: CARDIOLOGY | Age: 80
End: 2020-11-04

## 2020-11-04 VITALS
HEART RATE: 65 BPM | WEIGHT: 273 LBS | SYSTOLIC BLOOD PRESSURE: 100 MMHG | BODY MASS INDEX: 41.37 KG/M2 | HEIGHT: 68 IN | DIASTOLIC BLOOD PRESSURE: 52 MMHG | OXYGEN SATURATION: 98 %

## 2020-11-04 DIAGNOSIS — I95.9 HYPOTENSION, UNSPECIFIED HYPOTENSION TYPE: Primary | ICD-10-CM

## 2020-11-04 DIAGNOSIS — E78.00 PURE HYPERCHOLESTEROLEMIA: ICD-10-CM

## 2020-11-04 DIAGNOSIS — I95.9 HYPOTENSION: Primary | ICD-10-CM

## 2020-11-04 DIAGNOSIS — I48.0 PAROXYSMAL ATRIAL FIBRILLATION (CMD): ICD-10-CM

## 2020-11-04 LAB
ALBUMIN SERPL-MCNC: 3.3 G/DL
ALBUMIN/GLOB SERPL: 1.1 {RATIO}
ALP SERPL-CCNC: 72 U/L
ALT SERPL-CCNC: 27 UNITS/L
ANION GAP SERPL CALC-SCNC: 7 MMOL/L
AST SERPL-CCNC: 24 UNITS/L
BILIRUB SERPL-MCNC: 0.5 MG/DL
BUN SERPL-MCNC: 55 MG/DL
BUN/CREAT SERPL: 27.8
CALCIUM SERPL-MCNC: 8.9 MG/DL
CHLORIDE SERPL-SCNC: 104 MMOL/L
CHOLEST SERPL-MCNC: 149 MG/DL
CO2 SERPL-SCNC: 29 MMOL/L
CREAT SERPL-MCNC: 1.98 MG/DL
ERYTHROCYTE [DISTWIDTH] IN BLOOD BY AUTOMATED COUNT: 47.6 %
GLOBULIN SER-MCNC: 2.9 G/DL
GLUCOSE SERPL-MCNC: 109 MG/DL
HCT VFR BLD CALC: 39.6 %
HDLC SERPL-MCNC: 58 MG/DL
HGB BLD-MCNC: 12.8 G/DL
LDLC SERPL CALC-MCNC: 73 MG/DL
LENGTH OF FAST TIME PATIENT: NO H
LENGTH OF FAST TIME PATIENT: NO H
MCH RBC QN AUTO: 30.8 PG
MCHC RBC AUTO-ENTMCNC: 32.3 G/DL
MCV RBC AUTO: 95.2 FL
NONHDLC SERPL-MCNC: 91 MG/DL
PLATELET # BLD: 248 K/MCL
POTASSIUM SERPL-SCNC: 3.7 MMOL/L
PROT SERPL-MCNC: 6.2 G/DL
RBC # BLD: 4.16 10*6/UL
SODIUM SERPL-SCNC: 140 MMOL/L
TRIGL SERPL-MCNC: 88 MG/DL
VLDLC SERPL CALC-MCNC: 18 MG/DL
WBC # BLD: 6.6 K/MCL

## 2020-11-04 PROCEDURE — 80061 LIPID PANEL: CPT

## 2020-11-04 PROCEDURE — 36415 COLL VENOUS BLD VENIPUNCTURE: CPT

## 2020-11-04 PROCEDURE — 85025 COMPLETE CBC W/AUTO DIFF WBC: CPT

## 2020-11-04 PROCEDURE — 93000 ELECTROCARDIOGRAM COMPLETE: CPT | Performed by: NURSE PRACTITIONER

## 2020-11-04 PROCEDURE — 80053 COMPREHEN METABOLIC PANEL: CPT

## 2020-11-04 PROCEDURE — 3078F DIAST BP <80 MM HG: CPT | Performed by: NURSE PRACTITIONER

## 2020-11-04 PROCEDURE — 99214 OFFICE O/P EST MOD 30 MIN: CPT | Performed by: NURSE PRACTITIONER

## 2020-11-04 PROCEDURE — 3074F SYST BP LT 130 MM HG: CPT | Performed by: NURSE PRACTITIONER

## 2020-11-04 ASSESSMENT — PATIENT HEALTH QUESTIONNAIRE - PHQ9
CLINICAL INTERPRETATION OF PHQ9 SCORE: NO FURTHER SCREENING NEEDED
1. LITTLE INTEREST OR PLEASURE IN DOING THINGS: NOT AT ALL
2. FEELING DOWN, DEPRESSED OR HOPELESS: NOT AT ALL
SUM OF ALL RESPONSES TO PHQ9 QUESTIONS 1 AND 2: 0
CLINICAL INTERPRETATION OF PHQ2 SCORE: NO FURTHER SCREENING NEEDED
SUM OF ALL RESPONSES TO PHQ9 QUESTIONS 1 AND 2: 0

## 2020-11-05 ENCOUNTER — CLINICAL ABSTRACT (OUTPATIENT)
Dept: CARDIOLOGY | Age: 80
End: 2020-11-05

## 2020-11-06 ENCOUNTER — TELEPHONE (OUTPATIENT)
Dept: CARDIOLOGY | Age: 80
End: 2020-11-06

## 2020-11-17 DIAGNOSIS — E78.00 HYPERCHOLESTEREMIA: ICD-10-CM

## 2020-11-20 ENCOUNTER — OFFICE VISIT (OUTPATIENT)
Dept: SURGERY | Facility: CLINIC | Age: 80
End: 2020-11-20
Payer: COMMERCIAL

## 2020-11-20 VITALS
HEIGHT: 68 IN | DIASTOLIC BLOOD PRESSURE: 79 MMHG | BODY MASS INDEX: 40.92 KG/M2 | HEART RATE: 86 BPM | SYSTOLIC BLOOD PRESSURE: 120 MMHG | OXYGEN SATURATION: 95 % | WEIGHT: 270 LBS

## 2020-11-20 DIAGNOSIS — I10 HYPERTENSION, UNSPECIFIED TYPE: Primary | ICD-10-CM

## 2020-11-20 DIAGNOSIS — G47.33 OSA ON CPAP: ICD-10-CM

## 2020-11-20 DIAGNOSIS — N18.9 CHRONIC KIDNEY DISEASE, UNSPECIFIED CKD STAGE: ICD-10-CM

## 2020-11-20 DIAGNOSIS — Z99.89 OSA ON CPAP: ICD-10-CM

## 2020-11-20 DIAGNOSIS — E66.01 MORBID OBESITY WITH BMI OF 40.0-44.9, ADULT (HCC): ICD-10-CM

## 2020-11-20 DIAGNOSIS — E78.00 HIGH CHOLESTEROL: ICD-10-CM

## 2020-11-20 DIAGNOSIS — Z86.59 HISTORY OF DEPRESSION: ICD-10-CM

## 2020-11-20 DIAGNOSIS — Z86.73 HISTORY OF STROKE: ICD-10-CM

## 2020-11-20 DIAGNOSIS — R63.5 WEIGHT GAIN: ICD-10-CM

## 2020-11-20 PROCEDURE — 99213 OFFICE O/P EST LOW 20 MIN: CPT | Performed by: NURSE PRACTITIONER

## 2020-11-20 PROCEDURE — 3078F DIAST BP <80 MM HG: CPT | Performed by: NURSE PRACTITIONER

## 2020-11-20 PROCEDURE — 3074F SYST BP LT 130 MM HG: CPT | Performed by: NURSE PRACTITIONER

## 2020-11-20 PROCEDURE — 3008F BODY MASS INDEX DOCD: CPT | Performed by: NURSE PRACTITIONER

## 2020-11-20 NOTE — PROGRESS NOTES
[address], [address]  Dept: [phone]       Patient:  Jasiel Landrum  :      10/21/1940  MRN:      [id number]    Chief Complaint:  Patient present 41.05 kg/m²     Initial weight loss: +03   Total weight loss: -07   Start weight: 277    Wt Readings from Last 6 Encounters:  11/20/20 : 270 lb (122.5 kg)  11/17/20 : 272 lb (123.4 kg)  11/10/20 : 269 lb (122 kg)  11/06/20 : 273 lb (123.8 kg)  10/23/20 : 2 Alcohol/week: 0.0 standard drinks        Comment: Weekends      Drug use: No      Sexual activity: Not on file    Lifestyle      Physical activity        Days per week: Not on file        Minutes per session: Not on file      Stress: Not on file    Relatio · Patient is reading nutrition labels? no  · Average Caloric Intake:     · Average CHO Intake:  · Is patient exercising? yes  · Type of exercise?  Bike for  30 minutes 3 days/week    Eating Habits  · Patient states the following:  · Eats 3 meal(s) per [employer] obvious abnormality, atraumatic  Neck: no adenopathy, no carotid bruit, no JVD, supple, symmetrical, trachea midline and thyroid not enlarged, symmetric, no tenderness/mass/nodules  Lungs: clear to auscultation bilaterally  Heart: S1, S2 normal, no murmur, antihypertensive medication.      OBESITY/WEIGHT GAIN:  CKD:     Patient's goal weight: 250 lbs  Values:  Mobility, Healthy weight, Healthy appearance      Recommended patient continue intensive lifestyle and behavioral modifications at this time for weight

## 2020-11-20 NOTE — PATIENT INSTRUCTIONS
Breakfast:  1. Fruit and nuts/seeds. 2. Eggs scrambled with vegetables, cottage cheese. 3. Oatmeal (stovetop), cinnamon, 2 tbsp flaxseed, berries. 4. Plain Yogurt with berries.      Eat foods high in tryptophan and healthy omega 3 fats (these natural

## 2020-11-25 ENCOUNTER — OFFICE VISIT (OUTPATIENT)
Dept: CARDIOLOGY | Age: 80
End: 2020-11-25

## 2020-11-25 VITALS
BODY MASS INDEX: 40.62 KG/M2 | SYSTOLIC BLOOD PRESSURE: 122 MMHG | HEART RATE: 82 BPM | OXYGEN SATURATION: 97 % | DIASTOLIC BLOOD PRESSURE: 66 MMHG | HEIGHT: 68 IN | WEIGHT: 268 LBS

## 2020-11-25 DIAGNOSIS — I10 ESSENTIAL HYPERTENSION: ICD-10-CM

## 2020-11-25 DIAGNOSIS — I63.9 CEREBROVASCULAR ACCIDENT (CVA), UNSPECIFIED MECHANISM (CMD): ICD-10-CM

## 2020-11-25 DIAGNOSIS — R06.09 DYSPNEA ON EXERTION: Primary | ICD-10-CM

## 2020-11-25 DIAGNOSIS — E78.00 HYPERCHOLESTEREMIA: ICD-10-CM

## 2020-11-25 DIAGNOSIS — I48.0 PAROXYSMAL ATRIAL FIBRILLATION (CMD): ICD-10-CM

## 2020-11-25 PROCEDURE — 3078F DIAST BP <80 MM HG: CPT | Performed by: INTERNAL MEDICINE

## 2020-11-25 PROCEDURE — 3074F SYST BP LT 130 MM HG: CPT | Performed by: INTERNAL MEDICINE

## 2020-11-25 PROCEDURE — 99213 OFFICE O/P EST LOW 20 MIN: CPT | Performed by: INTERNAL MEDICINE

## 2020-11-25 RX ORDER — METHYLPHENIDATE HYDROCHLORIDE 5 MG/1
5 TABLET ORAL PRN
COMMUNITY
Start: 2020-11-16

## 2020-11-25 RX ORDER — FINASTERIDE 5 MG/1
5 TABLET, FILM COATED ORAL DAILY
COMMUNITY
Start: 2020-11-19 | End: 2021-06-02

## 2020-11-25 RX ORDER — OMEPRAZOLE 40 MG/1
40 CAPSULE, DELAYED RELEASE ORAL DAILY
COMMUNITY
Start: 2020-11-06

## 2020-11-25 ASSESSMENT — PATIENT HEALTH QUESTIONNAIRE - PHQ9
CLINICAL INTERPRETATION OF PHQ9 SCORE: NO FURTHER SCREENING NEEDED
SUM OF ALL RESPONSES TO PHQ9 QUESTIONS 1 AND 2: 2
SUM OF ALL RESPONSES TO PHQ9 QUESTIONS 1 AND 2: 2
CLINICAL INTERPRETATION OF PHQ2 SCORE: NO FURTHER SCREENING NEEDED
2. FEELING DOWN, DEPRESSED OR HOPELESS: SEVERAL DAYS
1. LITTLE INTEREST OR PLEASURE IN DOING THINGS: SEVERAL DAYS

## 2020-12-21 ENCOUNTER — LAB ENCOUNTER (OUTPATIENT)
Dept: LAB | Age: 80
End: 2020-12-21
Attending: INTERNAL MEDICINE
Payer: MEDICARE

## 2020-12-21 DIAGNOSIS — I10 HTN (HYPERTENSION): Primary | ICD-10-CM

## 2020-12-21 DIAGNOSIS — D50.0 IRON DEFICIENCY ANEMIA DUE TO CHRONIC BLOOD LOSS: ICD-10-CM

## 2020-12-21 DIAGNOSIS — I48.0 PAROXYSMAL ATRIAL FIBRILLATION (HCC): ICD-10-CM

## 2020-12-21 LAB
BUN SERPL-MCNC: 29 MG/DL
CALCIUM SERPL-MCNC: 9.3 MG/DL
CHLORIDE SERPL-SCNC: 102 MMOL/L
CREAT SERPL-MCNC: 1.71 MG/DL
GLUCOSE SERPL-MCNC: 133 MG/DL
POTASSIUM SERPL-SCNC: 3 MMOL/L
SODIUM SERPL-SCNC: 138 MMOL/L

## 2020-12-21 PROCEDURE — 83540 ASSAY OF IRON: CPT

## 2020-12-21 PROCEDURE — 80048 BASIC METABOLIC PNL TOTAL CA: CPT

## 2020-12-21 PROCEDURE — 84466 ASSAY OF TRANSFERRIN: CPT

## 2020-12-21 PROCEDURE — 36415 COLL VENOUS BLD VENIPUNCTURE: CPT

## 2020-12-21 PROCEDURE — 85025 COMPLETE CBC W/AUTO DIFF WBC: CPT

## 2020-12-22 ENCOUNTER — TELEPHONE (OUTPATIENT)
Dept: CARDIOLOGY | Age: 80
End: 2020-12-22

## 2020-12-22 ENCOUNTER — CLINICAL ABSTRACT (OUTPATIENT)
Dept: CARDIOLOGY | Age: 80
End: 2020-12-22

## 2020-12-22 DIAGNOSIS — I48.0 PAROXYSMAL ATRIAL FIBRILLATION (CMD): ICD-10-CM

## 2020-12-22 DIAGNOSIS — I10 ESSENTIAL HYPERTENSION: Primary | ICD-10-CM

## 2020-12-22 RX ORDER — FUROSEMIDE 20 MG/1
20 TABLET ORAL DAILY
Qty: 90 TABLET | Refills: 1 | OUTPATIENT
Start: 2020-12-22

## 2021-01-10 NOTE — PROGRESS NOTES
1/10/2021  [address] [place] [phone]    Dear Emmie Runner,       Here are your results from your recent visit with Gastroenterology. Anemia has resolved. Hemoglobin is 13.3. See Dr. Victoria Steve in February.       If you need any furthe

## 2021-01-26 ENCOUNTER — TELEPHONE (OUTPATIENT)
Dept: CARDIOLOGY | Age: 81
End: 2021-01-26

## 2021-01-26 RX ORDER — APIXABAN 5 MG/1
TABLET, FILM COATED ORAL
Qty: 60 TABLET | Refills: 11 | OUTPATIENT
Start: 2021-01-26

## 2021-02-02 DIAGNOSIS — Z23 NEED FOR VACCINATION: ICD-10-CM

## 2021-05-14 ENCOUNTER — OFFICE VISIT (OUTPATIENT)
Dept: SURGERY | Facility: CLINIC | Age: 81
End: 2021-05-14
Payer: COMMERCIAL

## 2021-05-14 VITALS
BODY MASS INDEX: 41.78 KG/M2 | HEIGHT: 68 IN | HEART RATE: 72 BPM | DIASTOLIC BLOOD PRESSURE: 75 MMHG | WEIGHT: 275.69 LBS | SYSTOLIC BLOOD PRESSURE: 124 MMHG

## 2021-05-14 DIAGNOSIS — E78.00 HIGH CHOLESTEROL: Primary | ICD-10-CM

## 2021-05-14 DIAGNOSIS — G47.33 OSA ON CPAP: ICD-10-CM

## 2021-05-14 DIAGNOSIS — Z86.59 HISTORY OF DEPRESSION: ICD-10-CM

## 2021-05-14 DIAGNOSIS — I10 HYPERTENSION, UNSPECIFIED TYPE: ICD-10-CM

## 2021-05-14 DIAGNOSIS — Z86.73 HISTORY OF STROKE: ICD-10-CM

## 2021-05-14 DIAGNOSIS — N18.9 CHRONIC KIDNEY DISEASE, UNSPECIFIED CKD STAGE: ICD-10-CM

## 2021-05-14 DIAGNOSIS — Z99.89 OSA ON CPAP: ICD-10-CM

## 2021-05-14 PROCEDURE — 99213 OFFICE O/P EST LOW 20 MIN: CPT | Performed by: NURSE PRACTITIONER

## 2021-05-14 PROCEDURE — 3074F SYST BP LT 130 MM HG: CPT | Performed by: NURSE PRACTITIONER

## 2021-05-14 PROCEDURE — 3078F DIAST BP <80 MM HG: CPT | Performed by: NURSE PRACTITIONER

## 2021-05-14 PROCEDURE — 3008F BODY MASS INDEX DOCD: CPT | Performed by: NURSE PRACTITIONER

## 2021-05-14 NOTE — PATIENT INSTRUCTIONS
Psyllium husk 1 tbsp with 10 oz of water. NOW or Castro. Follow up with dietician. Start PT. SMART Goals:   Long-term goal: Lose 15 lbs by September/October 2021.      Short-term goal:   Improve evening snack option to apples and pea

## 2021-05-17 DIAGNOSIS — R26.9 ABNORMALITY OF GAIT FOLLOWING CEREBROVASCULAR ACCIDENT (CVA): Primary | ICD-10-CM

## 2021-05-17 DIAGNOSIS — I69.398 ABNORMALITY OF GAIT FOLLOWING CEREBROVASCULAR ACCIDENT (CVA): Primary | ICD-10-CM

## 2021-05-20 DIAGNOSIS — N18.32 STAGE 3B CHRONIC KIDNEY DISEASE (HCC): Primary | ICD-10-CM

## 2021-05-21 ENCOUNTER — OFFICE VISIT (OUTPATIENT)
Dept: SURGERY | Facility: CLINIC | Age: 81
End: 2021-05-21
Payer: COMMERCIAL

## 2021-05-21 VITALS — WEIGHT: 276.13 LBS | BODY MASS INDEX: 41.85 KG/M2 | HEIGHT: 68 IN

## 2021-05-21 DIAGNOSIS — N18.32 STAGE 3B CHRONIC KIDNEY DISEASE (HCC): Primary | ICD-10-CM

## 2021-05-21 PROCEDURE — 3008F BODY MASS INDEX DOCD: CPT | Performed by: DIETITIAN, REGISTERED

## 2021-05-21 PROCEDURE — 97802 MEDICAL NUTRITION INDIV IN: CPT | Performed by: DIETITIAN, REGISTERED

## 2021-05-21 NOTE — PROGRESS NOTES
INITIAL OUTPATIENT NUTRITION CONSULTATION      Nutrition Assessment    Medical Diagnosis: Obesity and CKD III    Physical Findings: fatigue, back pain and knee pain    Client Age and Gender: 68year old male    Marital Status and Occupation: , [employer], [employer] (made self with Crystal Light + Iced tea), once in a while diet soda     Meal pattern: 2-3 meals/d, 1 snacks/d    Number of meals/week eaten at restaurants: drive-thru 9-6A/HWNG         Alcohol Intake: socially on the weekend - a few d

## 2021-05-29 ENCOUNTER — APPOINTMENT (OUTPATIENT)
Dept: MRI IMAGING | Facility: HOSPITAL | Age: 81
End: 2021-05-29
Attending: EMERGENCY MEDICINE
Payer: MEDICARE

## 2021-05-29 ENCOUNTER — HOSPITAL ENCOUNTER (EMERGENCY)
Facility: HOSPITAL | Age: 81
Discharge: HOME | End: 2021-05-29
Attending: EMERGENCY MEDICINE
Payer: MEDICARE

## 2021-05-29 ENCOUNTER — APPOINTMENT (OUTPATIENT)
Dept: CT IMAGING | Facility: HOSPITAL | Age: 81
End: 2021-05-29
Attending: EMERGENCY MEDICINE
Payer: MEDICARE

## 2021-05-29 VITALS
DIASTOLIC BLOOD PRESSURE: 74 MMHG | RESPIRATION RATE: 18 BRPM | WEIGHT: 260 LBS | HEART RATE: 82 BPM | HEIGHT: 68 IN | BODY MASS INDEX: 39.4 KG/M2 | SYSTOLIC BLOOD PRESSURE: 122 MMHG | OXYGEN SATURATION: 99 % | TEMPERATURE: 98 F

## 2021-05-29 DIAGNOSIS — R29.6 FREQUENT FALLS: Primary | ICD-10-CM

## 2021-05-29 DIAGNOSIS — E87.6 HYPOKALEMIA: ICD-10-CM

## 2021-05-29 PROCEDURE — 96360 HYDRATION IV INFUSION INIT: CPT

## 2021-05-29 PROCEDURE — 85025 COMPLETE CBC W/AUTO DIFF WBC: CPT | Performed by: EMERGENCY MEDICINE

## 2021-05-29 PROCEDURE — 93005 ELECTROCARDIOGRAM TRACING: CPT

## 2021-05-29 PROCEDURE — 83735 ASSAY OF MAGNESIUM: CPT | Performed by: EMERGENCY MEDICINE

## 2021-05-29 PROCEDURE — 84484 ASSAY OF TROPONIN QUANT: CPT | Performed by: EMERGENCY MEDICINE

## 2021-05-29 PROCEDURE — 93010 ELECTROCARDIOGRAM REPORT: CPT | Performed by: EMERGENCY MEDICINE

## 2021-05-29 PROCEDURE — 80048 BASIC METABOLIC PNL TOTAL CA: CPT | Performed by: EMERGENCY MEDICINE

## 2021-05-29 PROCEDURE — 70450 CT HEAD/BRAIN W/O DYE: CPT | Performed by: EMERGENCY MEDICINE

## 2021-05-29 PROCEDURE — 70551 MRI BRAIN STEM W/O DYE: CPT | Performed by: EMERGENCY MEDICINE

## 2021-05-29 PROCEDURE — 81001 URINALYSIS AUTO W/SCOPE: CPT | Performed by: EMERGENCY MEDICINE

## 2021-05-29 PROCEDURE — 99284 EMERGENCY DEPT VISIT MOD MDM: CPT

## 2021-05-29 RX ORDER — POTASSIUM CHLORIDE 20 MEQ/1
40 TABLET, EXTENDED RELEASE ORAL ONCE
Status: COMPLETED | OUTPATIENT
Start: 2021-05-29 | End: 2021-05-29

## 2021-05-29 NOTE — ED PROVIDER NOTES
Patient Seen in: [facility] AND [facility] Emergency Department      History   Patient presents with:  Fall    Stated Complaint: fall x2 last night, +eliquis, did not hit head, concerned about stroke     HPI/Subjective:   HPI  Patient is a 81-year-old male histo MD JERMAINE;  Location: [address] [place] [address]   • OTHER      Carpal tunnel surgery                Social History    Tobacco Use      Smoking status: Former Smoker        Packs/day: 0.50        Years: 10.00        Pack years: 5        Types: Cigars      Smokel back: Normal range of motion and neck supple. Skin:     General: Skin is warm. Capillary Refill: Capillary refill takes less than 2 seconds. Neurological:      General: No focal deficit present.       Mental Status: He is alert and oriented to pers RAINBOW DRAW LIGHT GREEN   RAINBOW DRAW GOLD     CT BRAIN OR HEAD (31580)    Result Date: 5/29/2021  CONCLUSION:  1. No acute intracranial process. 2. Stable small chronic ischemic cortical infarction in the left parietal lobe.  3. Stable chronic lacunar acute distress. Normal vital signs. Exam with no external signs of trauma, well-appearing male, alert and oriented x3, normal neuro exam.    EKG with no acute ischemia. Basic labs with hypokalemia, normal magnesium. Repleted orally.   Function at [facility]

## 2021-05-29 NOTE — ED INITIAL ASSESSMENT (HPI)
Pt to ED with c/o falls and dizziness x 2 last night. Pt states he hit back of head on wall in bathroom. Pt denies LOC. Pt on Eliquis. Pt alert and oriented x4. Pt denies cervical spine tenderness/pain. Pt ambulating by self with steady gait.    Pt denies c

## 2021-06-01 PROBLEM — E87.5 HYPERKALEMIA: Status: ACTIVE | Noted: 2021-06-01

## 2021-06-01 PROBLEM — R60.0 LOCALIZED EDEMA: Status: ACTIVE | Noted: 2021-06-01

## 2021-06-02 ENCOUNTER — OFFICE VISIT (OUTPATIENT)
Dept: CARDIOLOGY | Age: 81
End: 2021-06-02

## 2021-06-02 VITALS
WEIGHT: 256 LBS | BODY MASS INDEX: 38.8 KG/M2 | OXYGEN SATURATION: 94 % | SYSTOLIC BLOOD PRESSURE: 120 MMHG | HEART RATE: 93 BPM | HEIGHT: 68 IN | DIASTOLIC BLOOD PRESSURE: 70 MMHG

## 2021-06-02 DIAGNOSIS — I10 ESSENTIAL HYPERTENSION: Primary | ICD-10-CM

## 2021-06-02 DIAGNOSIS — R60.0 LOCALIZED EDEMA: ICD-10-CM

## 2021-06-02 DIAGNOSIS — I95.89 HYPOTENSION DUE TO HYPOVOLEMIA: ICD-10-CM

## 2021-06-02 DIAGNOSIS — E87.6 HYPOKALEMIA: ICD-10-CM

## 2021-06-02 DIAGNOSIS — E86.1 HYPOTENSION DUE TO HYPOVOLEMIA: ICD-10-CM

## 2021-06-02 PROCEDURE — 3078F DIAST BP <80 MM HG: CPT | Performed by: NURSE PRACTITIONER

## 2021-06-02 PROCEDURE — 99214 OFFICE O/P EST MOD 30 MIN: CPT | Performed by: NURSE PRACTITIONER

## 2021-06-02 PROCEDURE — 3074F SYST BP LT 130 MM HG: CPT | Performed by: NURSE PRACTITIONER

## 2021-06-02 RX ORDER — POTASSIUM CHLORIDE 20 MEQ/1
20 TABLET, EXTENDED RELEASE ORAL DAILY
Qty: 60 TABLET | Refills: 6 | Status: SHIPPED | OUTPATIENT
Start: 2021-06-02

## 2021-06-02 ASSESSMENT — PATIENT HEALTH QUESTIONNAIRE - PHQ9
CLINICAL INTERPRETATION OF PHQ2 SCORE: NO FURTHER SCREENING NEEDED
1. LITTLE INTEREST OR PLEASURE IN DOING THINGS: NOT AT ALL
2. FEELING DOWN, DEPRESSED OR HOPELESS: NOT AT ALL
CLINICAL INTERPRETATION OF PHQ9 SCORE: NO FURTHER SCREENING NEEDED
SUM OF ALL RESPONSES TO PHQ9 QUESTIONS 1 AND 2: 0
SUM OF ALL RESPONSES TO PHQ9 QUESTIONS 1 AND 2: 0

## 2021-06-07 ENCOUNTER — HOSPITAL ENCOUNTER (OUTPATIENT)
Dept: ULTRASOUND IMAGING | Facility: HOSPITAL | Age: 81
Discharge: HOME | End: 2021-06-07
Attending: FAMILY MEDICINE
Payer: MEDICARE

## 2021-06-07 DIAGNOSIS — R26.9 ABNORMALITY OF GAIT FOLLOWING CEREBROVASCULAR ACCIDENT (CVA): ICD-10-CM

## 2021-06-07 DIAGNOSIS — I69.398 ABNORMALITY OF GAIT FOLLOWING CEREBROVASCULAR ACCIDENT (CVA): ICD-10-CM

## 2021-06-07 PROCEDURE — 93880 EXTRACRANIAL BILAT STUDY: CPT | Performed by: FAMILY MEDICINE

## 2021-06-09 ENCOUNTER — LAB ENCOUNTER (OUTPATIENT)
Dept: LAB | Age: 81
End: 2021-06-09
Attending: INTERNAL MEDICINE
Payer: MEDICARE

## 2021-06-09 DIAGNOSIS — I10 ESSENTIAL HYPERTENSION, MALIGNANT: Primary | ICD-10-CM

## 2021-06-09 DIAGNOSIS — I48.0 PAROXYSMAL ATRIAL FIBRILLATION (HCC): ICD-10-CM

## 2021-06-09 LAB
BUN SERPL-MCNC: 45 MG/DL
CALCIUM SERPL-MCNC: 9.4 MG/DL
CHLORIDE SERPL-SCNC: 101 MMOL/L
CREAT SERPL-MCNC: 2.11 MG/DL
GLUCOSE SERPL-MCNC: 176 MG/DL
POTASSIUM SERPL-SCNC: 3.1 MMOL/L
SODIUM SERPL-SCNC: 139 MMOL/L

## 2021-06-09 PROCEDURE — 36415 COLL VENOUS BLD VENIPUNCTURE: CPT

## 2021-06-09 PROCEDURE — 80048 BASIC METABOLIC PNL TOTAL CA: CPT

## 2021-06-10 ENCOUNTER — CLINICAL ABSTRACT (OUTPATIENT)
Dept: CARDIOLOGY | Age: 81
End: 2021-06-10

## 2021-06-11 ENCOUNTER — TELEPHONE (OUTPATIENT)
Dept: CARDIOLOGY | Age: 81
End: 2021-06-11

## 2021-06-11 DIAGNOSIS — I10 ESSENTIAL HYPERTENSION: ICD-10-CM

## 2021-06-11 DIAGNOSIS — R60.0 LOCALIZED EDEMA: Primary | ICD-10-CM

## 2021-06-30 RX ORDER — APIXABAN 5 MG/1
TABLET, FILM COATED ORAL
Qty: 60 TABLET | Refills: 3 | Status: SHIPPED | OUTPATIENT
Start: 2021-06-30

## 2021-07-12 ENCOUNTER — TELEPHONE (OUTPATIENT)
Dept: CARDIOLOGY | Age: 81
End: 2021-07-12

## 2021-07-20 ENCOUNTER — HOSPITAL ENCOUNTER (INPATIENT)
Facility: HOSPITAL | Age: 81
LOS: 3 days | Discharge: HOME | DRG: 683 | End: 2021-07-23
Attending: EMERGENCY MEDICINE | Admitting: EMERGENCY MEDICINE
Payer: MEDICARE

## 2021-07-20 ENCOUNTER — APPOINTMENT (OUTPATIENT)
Dept: CT IMAGING | Facility: HOSPITAL | Age: 81
DRG: 683 | End: 2021-07-20
Attending: EMERGENCY MEDICINE
Payer: MEDICARE

## 2021-07-20 ENCOUNTER — HOSPITAL ENCOUNTER (OUTPATIENT)
Age: 81
Discharge: ANOTHER HOSPITAL | DRG: 683 | End: 2021-07-20
Attending: EMERGENCY MEDICINE
Payer: MEDICARE

## 2021-07-20 VITALS
TEMPERATURE: 98 F | OXYGEN SATURATION: 96 % | HEART RATE: 76 BPM | RESPIRATION RATE: 28 BRPM | DIASTOLIC BLOOD PRESSURE: 78 MMHG | SYSTOLIC BLOOD PRESSURE: 115 MMHG

## 2021-07-20 DIAGNOSIS — R47.1 DYSARTHRIA: ICD-10-CM

## 2021-07-20 DIAGNOSIS — R42 EPISODIC LIGHTHEADEDNESS: Primary | ICD-10-CM

## 2021-07-20 DIAGNOSIS — E87.6 HYPOKALEMIA: ICD-10-CM

## 2021-07-20 DIAGNOSIS — N17.9 ACUTE KIDNEY INJURY (HCC): Primary | ICD-10-CM

## 2021-07-20 LAB
ANION GAP SERPL CALC-SCNC: 8 MMOL/L (ref 0–18)
BASOPHILS # BLD AUTO: 0.04 X10(3) UL (ref 0–0.2)
BASOPHILS NFR BLD AUTO: 0.5 %
BUN BLD-MCNC: 53 MG/DL (ref 7–18)
BUN/CREAT SERPL: 23.9 (ref 10–20)
CALCIUM BLD-MCNC: 9.3 MG/DL (ref 8.5–10.1)
CHLORIDE SERPL-SCNC: 99 MMOL/L (ref 98–112)
CO2 SERPL-SCNC: 32 MMOL/L (ref 21–32)
CREAT BLD-MCNC: 2.22 MG/DL
DEPRECATED RDW RBC AUTO: 46.5 FL (ref 35.1–46.3)
EOSINOPHIL # BLD AUTO: 0.21 X10(3) UL (ref 0–0.7)
EOSINOPHIL NFR BLD AUTO: 2.6 %
ERYTHROCYTE [DISTWIDTH] IN BLOOD BY AUTOMATED COUNT: 15.2 % (ref 11–15)
GLUCOSE BLD-MCNC: 105 MG/DL (ref 70–99)
HCT VFR BLD AUTO: 37.7 %
HGB BLD-MCNC: 11.9 G/DL
IMM GRANULOCYTES # BLD AUTO: 0.02 X10(3) UL (ref 0–1)
IMM GRANULOCYTES NFR BLD: 0.2 %
LYMPHOCYTES # BLD AUTO: 0.82 X10(3) UL (ref 1–4)
LYMPHOCYTES NFR BLD AUTO: 10.1 %
MCH RBC QN AUTO: 26.3 PG (ref 26–34)
MCHC RBC AUTO-ENTMCNC: 31.6 G/DL (ref 31–37)
MCV RBC AUTO: 83.2 FL
MONOCYTES # BLD AUTO: 1.28 X10(3) UL (ref 0.1–1)
MONOCYTES NFR BLD AUTO: 15.8 %
NEUTROPHILS # BLD AUTO: 5.73 X10 (3) UL (ref 1.5–7.7)
NEUTROPHILS # BLD AUTO: 5.73 X10(3) UL (ref 1.5–7.7)
NEUTROPHILS NFR BLD AUTO: 70.8 %
OSMOLALITY SERPL CALC.SUM OF ELEC: 303 MOSM/KG (ref 275–295)
PLATELET # BLD AUTO: 285 10(3)UL (ref 150–450)
POTASSIUM SERPL-SCNC: 2.4 MMOL/L (ref 3.5–5.1)
RBC # BLD AUTO: 4.53 X10(6)UL
SARS-COV-2 RNA RESP QL NAA+PROBE: NOT DETECTED
SODIUM SERPL-SCNC: 139 MMOL/L (ref 136–145)
TROPONIN I SERPL-MCNC: <0.045 NG/ML (ref ?–0.04)
WBC # BLD AUTO: 8.1 X10(3) UL (ref 4–11)

## 2021-07-20 PROCEDURE — 93005 ELECTROCARDIOGRAM TRACING: CPT

## 2021-07-20 PROCEDURE — 93010 ELECTROCARDIOGRAM REPORT: CPT | Performed by: EMERGENCY MEDICINE

## 2021-07-20 PROCEDURE — 93010 ELECTROCARDIOGRAM REPORT: CPT

## 2021-07-20 PROCEDURE — 99214 OFFICE O/P EST MOD 30 MIN: CPT

## 2021-07-20 PROCEDURE — 70450 CT HEAD/BRAIN W/O DYE: CPT | Performed by: EMERGENCY MEDICINE

## 2021-07-20 RX ORDER — ONDANSETRON 2 MG/ML
4 INJECTION INTRAMUSCULAR; INTRAVENOUS EVERY 6 HOURS PRN
Status: DISCONTINUED | OUTPATIENT
Start: 2021-07-20 | End: 2021-07-23

## 2021-07-20 RX ORDER — ROSUVASTATIN CALCIUM 20 MG/1
20 TABLET, COATED ORAL NIGHTLY
Status: DISCONTINUED | OUTPATIENT
Start: 2021-07-20 | End: 2021-07-23

## 2021-07-20 RX ORDER — OXYBUTYNIN CHLORIDE 5 MG/1
5 TABLET ORAL 2 TIMES DAILY
Status: DISCONTINUED | OUTPATIENT
Start: 2021-07-20 | End: 2021-07-23

## 2021-07-20 RX ORDER — ZOLPIDEM TARTRATE 5 MG/1
5 TABLET ORAL NIGHTLY PRN
Status: DISCONTINUED | OUTPATIENT
Start: 2021-07-20 | End: 2021-07-23

## 2021-07-20 RX ORDER — BUMETANIDE 1 MG/1
1 TABLET ORAL DAILY
Status: ON HOLD | COMMUNITY
Start: 2021-07-15 | End: 2021-07-23

## 2021-07-20 RX ORDER — ACETAMINOPHEN 325 MG/1
650 TABLET ORAL EVERY 6 HOURS PRN
Status: DISCONTINUED | OUTPATIENT
Start: 2021-07-20 | End: 2021-07-23

## 2021-07-20 RX ORDER — PANTOPRAZOLE SODIUM 40 MG/1
40 TABLET, DELAYED RELEASE ORAL
Status: DISCONTINUED | OUTPATIENT
Start: 2021-07-21 | End: 2021-07-23

## 2021-07-20 RX ORDER — TAMSULOSIN HYDROCHLORIDE 0.4 MG/1
0.4 CAPSULE ORAL DAILY
Status: DISCONTINUED | OUTPATIENT
Start: 2021-07-21 | End: 2021-07-23

## 2021-07-20 RX ORDER — SODIUM CHLORIDE AND POTASSIUM CHLORIDE .9; .15 G/100ML; G/100ML
SOLUTION INTRAVENOUS CONTINUOUS
Status: DISCONTINUED | OUTPATIENT
Start: 2021-07-20 | End: 2021-07-22

## 2021-07-20 NOTE — ED INITIAL ASSESSMENT (HPI)
Dizzy and feeling lightheaded \"like I'm going to faint\". Saw cardiologist yesterday and given new RX for diuretic. Increased SOB walking into room. Denies chest pain.  Wife present, states his blood pressure has been low today with a brief episode of slur

## 2021-07-21 ENCOUNTER — APPOINTMENT (OUTPATIENT)
Dept: CV DIAGNOSTICS | Facility: HOSPITAL | Age: 81
DRG: 683 | End: 2021-07-21
Attending: HOSPITALIST
Payer: MEDICARE

## 2021-07-21 LAB
ANION GAP SERPL CALC-SCNC: 7 MMOL/L (ref 0–18)
BASOPHILS # BLD AUTO: 0.05 X10(3) UL (ref 0–0.2)
BASOPHILS NFR BLD AUTO: 0.8 %
BUN BLD-MCNC: 46 MG/DL (ref 7–18)
BUN/CREAT SERPL: 25.6 (ref 10–20)
CALCIUM BLD-MCNC: 8.4 MG/DL (ref 8.5–10.1)
CHLORIDE SERPL-SCNC: 104 MMOL/L (ref 98–112)
CO2 SERPL-SCNC: 32 MMOL/L (ref 21–32)
CREAT BLD-MCNC: 1.8 MG/DL
DEPRECATED RDW RBC AUTO: 47.9 FL (ref 35.1–46.3)
EOSINOPHIL # BLD AUTO: 0.23 X10(3) UL (ref 0–0.7)
EOSINOPHIL NFR BLD AUTO: 3.8 %
ERYTHROCYTE [DISTWIDTH] IN BLOOD BY AUTOMATED COUNT: 15.5 % (ref 11–15)
GLUCOSE BLD-MCNC: 94 MG/DL (ref 70–99)
HCT VFR BLD AUTO: 36.4 %
HGB BLD-MCNC: 11.3 G/DL
IMM GRANULOCYTES # BLD AUTO: 0.02 X10(3) UL (ref 0–1)
IMM GRANULOCYTES NFR BLD: 0.3 %
LYMPHOCYTES # BLD AUTO: 0.7 X10(3) UL (ref 1–4)
LYMPHOCYTES NFR BLD AUTO: 11.6 %
MCH RBC QN AUTO: 26.5 PG (ref 26–34)
MCHC RBC AUTO-ENTMCNC: 31 G/DL (ref 31–37)
MCV RBC AUTO: 85.2 FL
MONOCYTES # BLD AUTO: 0.97 X10(3) UL (ref 0.1–1)
MONOCYTES NFR BLD AUTO: 16.1 %
NEUTROPHILS # BLD AUTO: 4.07 X10 (3) UL (ref 1.5–7.7)
NEUTROPHILS # BLD AUTO: 4.07 X10(3) UL (ref 1.5–7.7)
NEUTROPHILS NFR BLD AUTO: 67.4 %
OSMOLALITY SERPL CALC.SUM OF ELEC: 308 MOSM/KG (ref 275–295)
PLATELET # BLD AUTO: 258 10(3)UL (ref 150–450)
POTASSIUM SERPL-SCNC: 2.7 MMOL/L (ref 3.5–5.1)
POTASSIUM SERPL-SCNC: 3.4 MMOL/L (ref 3.5–5.1)
RBC # BLD AUTO: 4.27 X10(6)UL
SODIUM SERPL-SCNC: 143 MMOL/L (ref 136–145)
WBC # BLD AUTO: 6 X10(3) UL (ref 4–11)

## 2021-07-21 PROCEDURE — 93306 TTE W/DOPPLER COMPLETE: CPT | Performed by: HOSPITALIST

## 2021-07-21 PROCEDURE — 99233 SBSQ HOSP IP/OBS HIGH 50: CPT | Performed by: HOSPITALIST

## 2021-07-21 RX ORDER — POTASSIUM CHLORIDE 14.9 MG/ML
20 INJECTION INTRAVENOUS ONCE
Status: COMPLETED | OUTPATIENT
Start: 2021-07-21 | End: 2021-07-21

## 2021-07-21 RX ORDER — DIPHENHYDRAMINE HYDROCHLORIDE, ZINC ACETATE 2; .1 G/100G; G/100G
1 CREAM TOPICAL 2 TIMES DAILY PRN
Status: DISCONTINUED | OUTPATIENT
Start: 2021-07-21 | End: 2021-07-23

## 2021-07-21 NOTE — PLAN OF CARE
Problem: Patient Centered Care  Goal: Patient preferences are identified and integrated in the patient's plan of care  Description: Interventions:  - What would you like us to know as we care for you?   - Provide timely, complete, and accurate informatio Absence of fever/infection during anticipated neutropenic period  Description: INTERVENTIONS  - Monitor WBC  - Administer growth factors as ordered  - Implement neutropenic guidelines  Outcome: Progressing     Problem: SAFETY ADULT - FALL  Goal: Free from around 0600, Dr Bruce Wilcoxs aware of results. Call light within reach, pt educated on safety and use of call light.  Non slip socks on, up with standby assist.

## 2021-07-21 NOTE — PROGRESS NOTES
[facility]    Progress Note    Loi Maddox Patient Status:  Inpatient    10/21/1940 MRN [id number]   Location [facility] 5SW/SE Attending Monique Birmingham MD   Hosp Day # 1 PCP Lowell Culver MD, Sanjana Savage MD     HPI/Subjective:   Amee De Oliveira likely reflect sequelae of chronic microangiopathy. 4. Intracranial atherosclerosis. 5. Lesser incidental findings as above.    Dictated by (CST): Yamileth Baker MD on 7/20/2021 at 9:04 PM     Finalized by (CST): Yamileth Baker MD on 7/20/2021 at 9:07 PM

## 2021-07-21 NOTE — ED PROVIDER NOTES
Patient Seen in: [facility]      History   Patient presents with:  Dizziness    Stated Complaint: dizzy spells when standing up    HPI/Subjective:   HPI    The patient is a 45-year-old male with a past history of hypertension, pulmonary embol BIOPSY, POSSIBLE POLYPECTOMY 85753;  Surgeon: Jigar Greenwood MD;  Location: [address] [place] [address]   • OTHER      Carpal tunnel surgery                Social History    Tobacco Use      Smoking status: Former Smoker        Packs/day: 0.50        Years: done 5/29/2021, there are no acute changes              Pulse ox is 96% on room air, normal.     Probable TIA/CVA related to hypotension, possibly from recent addition to the patient's blood pressure medicine regiment.   Recommend further evaluation in the

## 2021-07-21 NOTE — ED QUICK NOTES
Orders for admission, patient is aware of plan and ready to go upstairs. Any questions, please call ED RN Gisel Cooley  at extension [phone]. Type of COVID test sent:Rapid  COVID Suspicion level: Low- patient vaccinated, with no symptoms.      Titratable drug(s

## 2021-07-21 NOTE — CONSULTS
[facility]  Cardiology Consultation    Evelia Vergara Patient Status:  Inpatient    10/21/1940 MRN [id number]   [facility] 5SW[facility] Attending Ivan Cedeño MD   Hosp Day # 1 PCP Amos Thomas MD, Wilfred Cabot, MD     Reason for Consultation: COMMENTS)  Other                   ITCHING    Comment:Cat Dander    Medications:    Current Facility-Administered Medications:   •  [COMPLETED] potassium chloride 40 mEq in sodium chloride 0.9% 250 mL IVPB, 40 mEq, Intravenous, Once **FOLLOWED BY** robbie murmur, pericardial rub, S3.  Lungs: Clear without wheezes, rales, rhonchi or dullness. Normal excursions and effort. Abdomen: Soft, non-tender. BS-present. Extremities: Without clubbing, cyanosis or edema. Peripheral pulses are 2+.   Neurologic: Alert

## 2021-07-21 NOTE — H&P
[address] Patient Status:  Emergency    10/21/1940 MRN [id number]   Location [address] Attending Juan Daniel Yoder MD   Hosp Day # 0 PCP Amos Thomas MD, Wilfred Cabot, MD     Date: smoking. His smoking use included cigars. He has a 5.00 pack-year smoking history. He uses smokeless tobacco. He reports current alcohol use. He reports that he does not use drugs.     Allergies:    Cat Hair Extract        OTHER (SEE COMMENTS)  Radiology Co None.  Eye:  Pupils are equal, round and reactive to light, extraocular movements are intact, Normal conjunctiva. HENT:  Normocephalic, oral mucosa is moist.  Head:  Normocephalic, atraumatic.   Neck:  Supple, non-tender, no carotid bruit, no jugular venou and Plan:    Near syncope  Likely secondary to dehydration, will hold diuretics for now IV fluids initiated, check orthostatic vitals in a.m. we will get 2D echo to further evaluate    Severe hypokalemia  Again likely secondary to diuretic, initiate electr

## 2021-07-21 NOTE — ED PROVIDER NOTES
Patient Seen in: [facility] AND [facility] Emergency Department      History   Patient presents with:  Dizziness    Stated Complaint: slurred speech    HPI/Subjective:   HPI    Patient is a 77-year-old male who arrives for immediate care with history of stroke a Review of Systems    Positive for stated complaint: slurred speech  Other systems are as noted in HPI. Constitutional and vital signs reviewed. All other systems reviewed and negative except as noted above.     Physical Exam     ED Triage Vitals Abnormal            Final result                 Please view results for these tests on the individual orders.    RAINBOW DRAW LAVENDER   RAINBOW DRAW LIGHT GREEN   RAINBOW DRAW GOLD   RAPID SARS-COV-2 BY PCR     EKG    Rate, intervals and axes as noted on Recommend admission for further management and repeat blood work. Patient agreeable to plan.   Discussed with Dr. Lupe Dunn.     Admission disposition: 7/20/2021  9:24 PM                                  Disposition and Plan     Clinical Impression:  Acute kidn

## 2021-07-21 NOTE — PAYOR COMM NOTE
--------------  ADMISSION REVIEW     Payor: [address] [place], [place] [address] #:  [id number]  Authorization Number: [id number]       ED Provider Notes          History   Patient presents with:  Dizziness    Stated Complaint: slurred speech    HPI/Subje above.    Physical Exam     ED Triage Vitals [07/20/21 1948]   /72   Pulse 74   Resp 18   Temp 98.3 °F (36.8 °C)   Temp src Oral   SpO2 96 %   O2 Device        Current:BP (!) 127/106   Pulse 97   Temp 98.3 °F (36.8 °C) (Oral)   Resp (!) 28   Wt 116. 6 Nonspecific white matter changes involving both cerebral hemispheres that most likely reflect sequelae of chronic microangiopathy. 4. Intracranial atherosclerosis. 5. Lesser incidental findings as above.    Dictated by (CST): Chantale De La Cruz MD on 7/20/2021 Negative. Neurologic: Dizzy, lightheaded  Psychiatric:  Negative.   ROS reviewed as documented in chart    Physical Exam:  Temp:  [97.6 °F (36.4 °C)-98.3 °F (36.8 °C)] 98.3 °F (36.8 °C)  Pulse:  [74-97] 97  Resp:  [18-28] 28  BP: (110-127)/() 127/106 protocol. Acute on chronic kidney disease stage III  Initiate IV fluids monitor I's and O's, avoid nephrotoxic medications. Will repeat BMP in a.m. Prior PE  Continue Eliquis    Bipolar mood disorder  Resume home medications.     Prophylaxis  Current ABOVE ASSESSMENT AND PLAN  CARDIOLOGY ON CONSULT  CONT IVF AND ELECTROLYTE REPLACEMENT            07/21/21 0613 — 86 — 96/65 — —   07/21/21 0611 — 77 — 120/79 — —   07/21/21 0608 97.7 °F (36.5 °C) 72 18 123/66 93 % None (Room air)         07/20/21 1948 98.

## 2021-07-21 NOTE — ED QUICK NOTES
Transport at bedside. Patient aware of plan of care, verbalizes understanding. Brought to floor with belongings.

## 2021-07-21 NOTE — PLAN OF CARE
Patient admitted and oriented to unit, bed alarm on and call light within reach. Per patient dizziness has much improved. IV fluids. Cpap at night. Orthostatics taken. Frequent rounding.      Problem: Patient Centered Care  Goal: Patient preferences are yudy Anticipate increased pain with activity and pre-medicate as appropriate  Outcome: Progressing     Problem: RISK FOR INFECTION - ADULT  Goal: Absence of fever/infection during anticipated neutropenic period  Description: INTERVENTIONS  - Monitor WBC  - Admi

## 2021-07-22 ENCOUNTER — APPOINTMENT (OUTPATIENT)
Dept: INTERVENTIONAL RADIOLOGY/VASCULAR | Facility: HOSPITAL | Age: 81
DRG: 683 | End: 2021-07-22
Attending: NURSE PRACTITIONER
Payer: MEDICARE

## 2021-07-22 LAB
ANION GAP SERPL CALC-SCNC: 6 MMOL/L (ref 0–18)
BUN BLD-MCNC: 33 MG/DL (ref 7–18)
BUN/CREAT SERPL: 24.3 (ref 10–20)
CALCIUM BLD-MCNC: 8.4 MG/DL (ref 8.5–10.1)
CHLORIDE SERPL-SCNC: 109 MMOL/L (ref 98–112)
CO2 SERPL-SCNC: 29 MMOL/L (ref 21–32)
CREAT BLD-MCNC: 1.36 MG/DL
GLUCOSE BLD-MCNC: 109 MG/DL (ref 70–99)
OSMOLALITY SERPL CALC.SUM OF ELEC: 306 MOSM/KG (ref 275–295)
POTASSIUM SERPL-SCNC: 3.3 MMOL/L (ref 3.5–5.1)
SODIUM SERPL-SCNC: 144 MMOL/L (ref 136–145)

## 2021-07-22 PROCEDURE — 4A023N6 MEASUREMENT OF CARDIAC SAMPLING AND PRESSURE, RIGHT HEART, PERCUTANEOUS APPROACH: ICD-10-PCS | Performed by: INTERNAL MEDICINE

## 2021-07-22 PROCEDURE — 99233 SBSQ HOSP IP/OBS HIGH 50: CPT | Performed by: HOSPITALIST

## 2021-07-22 RX ORDER — POTASSIUM CHLORIDE 20 MEQ/1
40 TABLET, EXTENDED RELEASE ORAL ONCE
Status: COMPLETED | OUTPATIENT
Start: 2021-07-22 | End: 2021-07-22

## 2021-07-22 RX ORDER — SODIUM CHLORIDE 9 MG/ML
INJECTION, SOLUTION INTRAVENOUS
Status: DISCONTINUED | OUTPATIENT
Start: 2021-07-23 | End: 2021-07-23

## 2021-07-22 RX ORDER — LIDOCAINE HYDROCHLORIDE 20 MG/ML
INJECTION, SOLUTION EPIDURAL; INFILTRATION; INTRACAUDAL; PERINEURAL
Status: COMPLETED
Start: 2021-07-22 | End: 2021-07-22

## 2021-07-22 RX ORDER — CHLORHEXIDINE GLUCONATE 4 G/100ML
30 SOLUTION TOPICAL
Status: DISCONTINUED | OUTPATIENT
Start: 2021-07-23 | End: 2021-07-23

## 2021-07-22 RX ORDER — BUMETANIDE 1 MG/1
0.5 TABLET ORAL DAILY
Status: DISCONTINUED | OUTPATIENT
Start: 2021-07-22 | End: 2021-07-23

## 2021-07-22 RX ORDER — METHYLPHENIDATE HYDROCHLORIDE 10 MG/1
10 TABLET ORAL DAILY
Status: DISCONTINUED | OUTPATIENT
Start: 2021-07-22 | End: 2021-07-23

## 2021-07-22 NOTE — PLAN OF CARE
A/Ox4. Fall risk precautions appropriate for pt: bed in lowest position, call light within reach, non-skid socks. Alarm para meters appropriate for pt: bed/chair alarm on. Right cardiac cath with Dr. Beena Arias today. No bleeding onsite assessment.   K replac goal  Description: INTERVENTIONS:  - Encourage pt to monitor pain and request assistance  - Assess pain using appropriate pain scale  - Administer analgesics based on type and severity of pain and evaluate response  - Implement non-pharmacological measures to assist at discharge as needed  - Consider post-discharge preferences of patient/family/discharge partner  - Complete POLST form as appropriate  - Assess patient's ability to be responsible for managing their own health  - Refer to Case Management Depart

## 2021-07-22 NOTE — PROGRESS NOTES
[address]                                                            PROGRESS NOTE      Patient Name: Parth Lyman MRN: [id number]   : 10/21/1940 CSN: [id number] arthritic changes  Neuro - A+Ox3, no facial droop or gross deficits  Psych - cooperative, calm    Results:     Lab Results   Component Value Date    WBC 6.0 07/21/2021    HGB 11.3 (L) 07/21/2021    HCT 36.4 (L) 07/21/2021    .0 07/21/2021    CREATSE

## 2021-07-22 NOTE — PROGRESS NOTES
[facility]    Progress Note    Loi Maddox Patient Status:  Inpatient    10/21/1940 MRN [id number]   Location [facility] 5SW/SE Attending Monique Birmingham MD   Hosp Day # 2 PCP Lowell Culver MD, Sanjana Savage MD     HPI/Subjective:   Amee De Oliveira Intracranial atherosclerosis. 5. Lesser incidental findings as above.    Dictated by (CST): Chantale De La Cruz MD on 7/20/2021 at 9:04 PM     Finalized by (CST): Chantale De La Cruz MD on 7/20/2021 at 9:07 PM          EKG    Result Date: 7/20/2021  ECG Report  Int

## 2021-07-22 NOTE — PROCEDURES
[facility]    [facility] Cardiac Cath Procedure Note  Malachi Oliveros Patient Status:  Inpatient    10/21/1940 MRN [id number]   Location [facility] 5SW/SE Attending Ny Cao MD   Hosp Day # 2 PCP Lilian Mackenzie MD, Yani Morejon MD       Car

## 2021-07-22 NOTE — PLAN OF CARE
Monitoring vital signs per protocol. No acute changes at this time. Fall precautions in place: bed in lowest position, bed alarm on, nonslip socks on, call light within reach. Frequent rounding by nursing staff. Frequent repositioning encouraged.       Prob assistance  - Assess pain using appropriate pain scale  - Administer analgesics based on type and severity of pain and evaluate response  - Implement non-pharmacological measures as appropriate and evaluate response  - Consider cultural and social influenc patient/family/discharge partner  - Complete POLST form as appropriate  - Assess patient's ability to be responsible for managing their own health  - Refer to Case Management Department for coordinating discharge planning if the patient needs post-hospital

## 2021-07-23 VITALS
BODY MASS INDEX: 40 KG/M2 | TEMPERATURE: 99 F | SYSTOLIC BLOOD PRESSURE: 108 MMHG | WEIGHT: 261.19 LBS | HEART RATE: 70 BPM | DIASTOLIC BLOOD PRESSURE: 61 MMHG | OXYGEN SATURATION: 94 % | RESPIRATION RATE: 16 BRPM

## 2021-07-23 LAB
ANION GAP SERPL CALC-SCNC: 7 MMOL/L (ref 0–18)
BASOPHILS # BLD AUTO: 0.04 X10(3) UL (ref 0–0.2)
BASOPHILS NFR BLD AUTO: 0.7 %
BUN BLD-MCNC: 24 MG/DL (ref 7–18)
BUN/CREAT SERPL: 18.3 (ref 10–20)
CALCIUM BLD-MCNC: 8.5 MG/DL (ref 8.5–10.1)
CHLORIDE SERPL-SCNC: 108 MMOL/L (ref 98–112)
CO2 SERPL-SCNC: 27 MMOL/L (ref 21–32)
CREAT BLD-MCNC: 1.31 MG/DL
DEPRECATED RDW RBC AUTO: 50.1 FL (ref 35.1–46.3)
EOSINOPHIL # BLD AUTO: 0.31 X10(3) UL (ref 0–0.7)
EOSINOPHIL NFR BLD AUTO: 5.1 %
ERYTHROCYTE [DISTWIDTH] IN BLOOD BY AUTOMATED COUNT: 15.8 % (ref 11–15)
GLUCOSE BLD-MCNC: 116 MG/DL (ref 70–99)
HCT VFR BLD AUTO: 38.8 %
HGB BLD-MCNC: 11.6 G/DL
IMM GRANULOCYTES # BLD AUTO: 0.02 X10(3) UL (ref 0–1)
IMM GRANULOCYTES NFR BLD: 0.3 %
LYMPHOCYTES # BLD AUTO: 0.79 X10(3) UL (ref 1–4)
LYMPHOCYTES NFR BLD AUTO: 13.1 %
MCH RBC QN AUTO: 25.9 PG (ref 26–34)
MCHC RBC AUTO-ENTMCNC: 29.9 G/DL (ref 31–37)
MCV RBC AUTO: 86.6 FL
MONOCYTES # BLD AUTO: 0.75 X10(3) UL (ref 0.1–1)
MONOCYTES NFR BLD AUTO: 12.4 %
NEUTROPHILS # BLD AUTO: 4.14 X10 (3) UL (ref 1.5–7.7)
NEUTROPHILS # BLD AUTO: 4.14 X10(3) UL (ref 1.5–7.7)
NEUTROPHILS NFR BLD AUTO: 68.4 %
OSMOLALITY SERPL CALC.SUM OF ELEC: 299 MOSM/KG (ref 275–295)
PLATELET # BLD AUTO: 265 10(3)UL (ref 150–450)
POTASSIUM SERPL-SCNC: 3.3 MMOL/L (ref 3.5–5.1)
RBC # BLD AUTO: 4.48 X10(6)UL
SODIUM SERPL-SCNC: 142 MMOL/L (ref 136–145)
WBC # BLD AUTO: 6.1 X10(3) UL (ref 4–11)

## 2021-07-23 RX ORDER — BUMETANIDE 1 MG/1
0.5 TABLET ORAL DAILY
Qty: 3 TABLET | Refills: 5 | Status: SHIPPED | OUTPATIENT
Start: 2021-07-23 | End: 2021-11-09

## 2021-07-23 NOTE — PROGRESS NOTES
Discharge RN Summary: Patient has discharge order in. Patient to discharge home with wife. IV removed by this RN. Understands to follow up with PCP in 1 week. Patient understands to  meds from pharmacy.  Per dr. Samira Thomas provided patient with Knee le

## 2021-07-23 NOTE — PROGRESS NOTES
[facility]      Subjective:  Up in room no dyspnea hoping for DC today    Objective:  /61 (BP Location: Right arm)   Pulse 70   Temp 98.6 °F (37 °C) (Oral)   Resp 16   Wt 261 lb 3.2 oz (118.5 kg)   SpO2 94%   BMI 39.72 kg/m² CI 2.2 L/min/m² by Artemio  SVR 1298 dynes/  dynes     Ao sat 94%  PA sat 57.9%     Chronic HFpEF  - stable EF on echo. RHC as above.      PAF  - dx on event monitor after CVA in 20119, on Eliquis.  Apparently had AF in ambulance, currently in [address]

## 2021-07-23 NOTE — DISCHARGE SUMMARY
[facility]    Discharge Summary    Denise Soto Patient Status:  Inpatient    10/21/1940 MRN [id number]   Location [facility] 5SW/SE Attending Faizan Pope MD   Hosp Day # 3 PCP Isreal Jones MD, Martha Mike MD     Date of Admission:  palpitations.   In ER was found to be orthostatic    Hospital Course:   Near syncope  Lower extremity edema   Likely secondary to dehydration  - hold diuretics, now resumed per cards   - continue IV fluids initiated, now stopped   - + orthostatics, now reso Tabs  Commonly known as: LATUDA      Take 20 mg by mouth daily with breakfast.   Refills: 0     Mesalamine 4 g Enem      Use Monday, Wednesday, Friday night. Quantity: 12 enema  Refills: 11     methylphenidate 10 MG Tabs      Take 10 mg by mouth daily.

## 2021-07-26 NOTE — PAYOR COMM NOTE
Discharge Notification    Patient Name: Eda Aguirre: Deepa Andrea [place] #: [id number]  Authorization Number: [id number]  Admit Date/Time: 7/20/2021 7:54 PM  Discharge Date/Time: 7/23/2021 11:18 AM

## 2021-07-30 ENCOUNTER — LAB ENCOUNTER (OUTPATIENT)
Dept: LAB | Age: 81
End: 2021-07-30
Attending: NURSE PRACTITIONER
Payer: MEDICARE

## 2021-07-30 DIAGNOSIS — N17.9 ACUTE KIDNEY INJURY (HCC): ICD-10-CM

## 2021-07-30 DIAGNOSIS — E87.6 HYPOKALEMIA: ICD-10-CM

## 2021-07-30 LAB
ANION GAP SERPL CALC-SCNC: 4 MMOL/L (ref 0–18)
BUN BLD-MCNC: 28 MG/DL (ref 7–18)
BUN/CREAT SERPL: 17.8 (ref 10–20)
CALCIUM BLD-MCNC: 8.8 MG/DL (ref 8.5–10.1)
CHLORIDE SERPL-SCNC: 109 MMOL/L (ref 98–112)
CO2 SERPL-SCNC: 30 MMOL/L (ref 21–32)
CREAT BLD-MCNC: 1.57 MG/DL
GLUCOSE BLD-MCNC: 82 MG/DL (ref 70–99)
OSMOLALITY SERPL CALC.SUM OF ELEC: 301 MOSM/KG (ref 275–295)
POTASSIUM SERPL-SCNC: 3.7 MMOL/L (ref 3.5–5.1)
SODIUM SERPL-SCNC: 143 MMOL/L (ref 136–145)

## 2021-07-30 PROCEDURE — 36415 COLL VENOUS BLD VENIPUNCTURE: CPT

## 2021-07-30 PROCEDURE — 80048 BASIC METABOLIC PNL TOTAL CA: CPT

## 2021-10-13 ENCOUNTER — HOSPITAL ENCOUNTER (OUTPATIENT)
Dept: ULTRASOUND IMAGING | Facility: HOSPITAL | Age: 81
Discharge: HOME | End: 2021-10-13
Attending: RADIOLOGY
Payer: MEDICARE

## 2021-10-13 DIAGNOSIS — I83.893 VARICOSE VEINS OF LEG WITH SWELLING, BILATERAL: ICD-10-CM

## 2021-10-13 PROCEDURE — 93970 EXTREMITY STUDY: CPT | Performed by: RADIOLOGY

## 2021-11-02 ENCOUNTER — LAB ENCOUNTER (OUTPATIENT)
Dept: LAB | Age: 81
End: 2021-11-02
Attending: UROLOGY
Payer: MEDICARE

## 2021-11-02 DIAGNOSIS — G47.33 OSA ON CPAP: ICD-10-CM

## 2021-11-02 DIAGNOSIS — Z99.89 OSA ON CPAP: ICD-10-CM

## 2021-11-02 DIAGNOSIS — I10 BENIGN ESSENTIAL HTN: ICD-10-CM

## 2021-11-02 DIAGNOSIS — I51.5 CARDIAC CALCIFICATION (HCC): ICD-10-CM

## 2021-11-02 DIAGNOSIS — I50.32 CHRONIC HEART FAILURE WITH PRESERVED EJECTION FRACTION (HFPEF) (HCC): ICD-10-CM

## 2021-11-02 DIAGNOSIS — E78.5 DYSLIPIDEMIA: ICD-10-CM

## 2021-11-02 DIAGNOSIS — R60.0 LOWER EXTREMITY EDEMA: ICD-10-CM

## 2021-11-02 PROCEDURE — 36415 COLL VENOUS BLD VENIPUNCTURE: CPT

## 2021-11-02 PROCEDURE — 80048 BASIC METABOLIC PNL TOTAL CA: CPT

## 2021-11-02 PROCEDURE — 85027 COMPLETE CBC AUTOMATED: CPT

## 2021-11-02 PROCEDURE — 80061 LIPID PANEL: CPT

## 2021-11-09 PROBLEM — I50.32 CHRONIC HEART FAILURE WITH PRESERVED EJECTION FRACTION (HCC): Status: ACTIVE | Noted: 2021-11-09

## 2021-11-09 PROBLEM — I25.10 CORONARY ARTERY DISEASE INVOLVING NATIVE CORONARY ARTERY OF NATIVE HEART WITHOUT ANGINA PECTORIS: Status: ACTIVE | Noted: 2021-11-09

## 2021-11-16 DIAGNOSIS — Z01.812 PRE-PROCEDURE LAB EXAM: Primary | ICD-10-CM

## 2021-12-18 ENCOUNTER — HOSPITAL ENCOUNTER (OUTPATIENT)
Dept: GENERAL RADIOLOGY | Age: 81
Discharge: HOME | End: 2021-12-18
Attending: INTERNAL MEDICINE
Payer: MEDICARE

## 2021-12-18 ENCOUNTER — NURSE ONLY (OUTPATIENT)
Dept: LAB | Age: 81
End: 2021-12-18
Attending: INTERNAL MEDICINE
Payer: MEDICARE

## 2021-12-18 ENCOUNTER — LAB ENCOUNTER (OUTPATIENT)
Dept: LAB | Age: 81
End: 2021-12-18
Attending: INTERNAL MEDICINE
Payer: MEDICARE

## 2021-12-18 DIAGNOSIS — I50.32 CHRONIC HEART FAILURE WITH PRESERVED EJECTION FRACTION (HCC): ICD-10-CM

## 2021-12-18 DIAGNOSIS — Z01.818 PRE-OP TESTING: ICD-10-CM

## 2021-12-18 DIAGNOSIS — Z01.818 PREOP TESTING: ICD-10-CM

## 2021-12-18 DIAGNOSIS — I10 HYPERTENSION, UNSPECIFIED TYPE: ICD-10-CM

## 2021-12-18 DIAGNOSIS — E78.00 HIGH CHOLESTEROL: ICD-10-CM

## 2021-12-18 DIAGNOSIS — I25.10 CORONARY ARTERY DISEASE INVOLVING NATIVE CORONARY ARTERY OF NATIVE HEART WITHOUT ANGINA PECTORIS: ICD-10-CM

## 2021-12-18 DIAGNOSIS — Z86.73 HISTORY OF STROKE: ICD-10-CM

## 2021-12-18 PROCEDURE — 36415 COLL VENOUS BLD VENIPUNCTURE: CPT

## 2021-12-18 PROCEDURE — 80048 BASIC METABOLIC PNL TOTAL CA: CPT

## 2021-12-18 PROCEDURE — 71046 X-RAY EXAM CHEST 2 VIEWS: CPT | Performed by: INTERNAL MEDICINE

## 2021-12-18 PROCEDURE — 85025 COMPLETE CBC W/AUTO DIFF WBC: CPT

## 2021-12-21 ENCOUNTER — HOSPITAL ENCOUNTER (OUTPATIENT)
Dept: INTERVENTIONAL RADIOLOGY/VASCULAR | Facility: HOSPITAL | Age: 81
Discharge: HOME | End: 2021-12-22
Attending: INTERNAL MEDICINE | Admitting: INTERNAL MEDICINE
Payer: MEDICARE

## 2021-12-21 DIAGNOSIS — Z01.818 PRE-OP TESTING: Primary | ICD-10-CM

## 2021-12-21 DIAGNOSIS — R94.39 ABNORMAL STRESS TEST: ICD-10-CM

## 2021-12-21 DIAGNOSIS — I50.30 DIASTOLIC CHF (HCC): ICD-10-CM

## 2021-12-21 DIAGNOSIS — R94.39 ABNORMAL NUCLEAR STRESS TEST: ICD-10-CM

## 2021-12-21 PROCEDURE — 99153 MOD SED SAME PHYS/QHP EA: CPT

## 2021-12-21 PROCEDURE — B2111ZZ FLUOROSCOPY OF MULTIPLE CORONARY ARTERIES USING LOW OSMOLAR CONTRAST: ICD-10-PCS | Performed by: INTERNAL MEDICINE

## 2021-12-21 PROCEDURE — 93460 R&L HRT ART/VENTRICLE ANGIO: CPT

## 2021-12-21 PROCEDURE — 4A023N8 MEASUREMENT OF CARDIAC SAMPLING AND PRESSURE, BILATERAL, PERCUTANEOUS APPROACH: ICD-10-PCS | Performed by: INTERNAL MEDICINE

## 2021-12-21 PROCEDURE — 99152 MOD SED SAME PHYS/QHP 5/>YRS: CPT

## 2021-12-21 RX ORDER — VERAPAMIL HYDROCHLORIDE 2.5 MG/ML
INJECTION, SOLUTION INTRAVENOUS
Status: COMPLETED
Start: 2021-12-21 | End: 2021-12-21

## 2021-12-21 RX ORDER — ASPIRIN 81 MG/1
324 TABLET, CHEWABLE ORAL ONCE
Status: COMPLETED | OUTPATIENT
Start: 2021-12-21 | End: 2021-12-21

## 2021-12-21 RX ORDER — METOPROLOL SUCCINATE 25 MG/1
25 TABLET, EXTENDED RELEASE ORAL DAILY
Status: DISCONTINUED | OUTPATIENT
Start: 2021-12-21 | End: 2021-12-22

## 2021-12-21 RX ORDER — SODIUM CHLORIDE 9 MG/ML
INJECTION, SOLUTION INTRAVENOUS CONTINUOUS
Status: ACTIVE | OUTPATIENT
Start: 2021-12-21 | End: 2021-12-21

## 2021-12-21 RX ORDER — ROSUVASTATIN CALCIUM 20 MG/1
20 TABLET, COATED ORAL DAILY
Status: DISCONTINUED | OUTPATIENT
Start: 2021-12-22 | End: 2021-12-22

## 2021-12-21 RX ORDER — ASPIRIN 81 MG/1
TABLET, CHEWABLE ORAL
Status: COMPLETED
Start: 2021-12-21 | End: 2021-12-21

## 2021-12-21 RX ORDER — BUMETANIDE 1 MG/1
1 TABLET ORAL DAILY
Status: DISCONTINUED | OUTPATIENT
Start: 2021-12-21 | End: 2021-12-22

## 2021-12-21 RX ORDER — SODIUM CHLORIDE 9 MG/ML
INJECTION, SOLUTION INTRAVENOUS
Status: ACTIVE | OUTPATIENT
Start: 2021-12-21 | End: 2021-12-22

## 2021-12-21 RX ORDER — HEPARIN SODIUM 1000 [USP'U]/ML
INJECTION, SOLUTION INTRAVENOUS; SUBCUTANEOUS
Status: COMPLETED
Start: 2021-12-21 | End: 2021-12-21

## 2021-12-21 RX ORDER — MIDAZOLAM HYDROCHLORIDE 1 MG/ML
INJECTION INTRAMUSCULAR; INTRAVENOUS
Status: COMPLETED
Start: 2021-12-21 | End: 2021-12-21

## 2021-12-21 RX ORDER — NITROGLYCERIN 20 MG/100ML
INJECTION INTRAVENOUS
Status: DISCONTINUED
Start: 2021-12-21 | End: 2021-12-21 | Stop reason: WASHOUT

## 2021-12-21 RX ORDER — LIDOCAINE HYDROCHLORIDE 20 MG/ML
INJECTION, SOLUTION EPIDURAL; INFILTRATION; INTRACAUDAL; PERINEURAL
Status: COMPLETED
Start: 2021-12-21 | End: 2021-12-21

## 2021-12-21 RX ADMIN — METOPROLOL SUCCINATE 25 MG: 25 TABLET, EXTENDED RELEASE ORAL at 19:21:00

## 2021-12-21 RX ADMIN — SODIUM CHLORIDE: 9 INJECTION, SOLUTION INTRAVENOUS at 15:30:00

## 2021-12-21 RX ADMIN — ASPIRIN 324 MG: 81 TABLET, CHEWABLE ORAL at 13:15:00

## 2021-12-21 NOTE — IVS NOTE
Tolerated procedure well. TR Band intact right wrist.  Right groin site clean and dry, slight swelling noted pressure held 10 minutes. Site remains clean and dry. Tolerated PO crackers and fluids. IV NS at 100cc hour for 4 hours  On bedrest for 4 hours.

## 2021-12-21 NOTE — PROCEDURES
Procedure Report    Indication for procedure: dyspnea on exertion, abnormal stress test    Procedures performed:  1) Left heart catheterization  2) Coronary angiography   3) Right heart catheterization  4) Supervision of moderate sedation from 1334 to 1500 branch with mild aneurysmal segment  RCA nondominant and anomalous; nonselective angiogram without significant stenosis  LVEDP 21mmHg  8mmHg peak to peak gradient on LV-Ao pullback    RA 18  RV 41/16/18  PA 36/17, mean 27  PCW 21    /4, EDP 21  Ao 15

## 2021-12-21 NOTE — INTERVAL H&P NOTE
Pre-op Diagnosis: * No pre-op diagnosis entered *    The above referenced H&P was reviewed by Deandre Stock MD on 12/21/2021, the patient was examined and no significant changes have occurred in the patient's condition since the H&P was performed.   I discus

## 2021-12-22 VITALS
OXYGEN SATURATION: 96 % | HEART RATE: 67 BPM | RESPIRATION RATE: 16 BRPM | SYSTOLIC BLOOD PRESSURE: 146 MMHG | BODY MASS INDEX: 42.04 KG/M2 | WEIGHT: 277.38 LBS | DIASTOLIC BLOOD PRESSURE: 72 MMHG | HEIGHT: 68 IN | TEMPERATURE: 98 F

## 2021-12-22 RX ADMIN — METOPROLOL SUCCINATE 25 MG: 25 TABLET, EXTENDED RELEASE ORAL at 08:13:00

## 2021-12-22 RX ADMIN — ROSUVASTATIN CALCIUM 20 MG: 20 TABLET, COATED ORAL at 08:13:00

## 2021-12-22 RX ADMIN — BUMETANIDE 1 MG: 1 TABLET ORAL at 08:13:00

## 2021-12-22 NOTE — PLAN OF CARE
Received patient from cath lab. Patient alert and oriented x4, saturating well on room air. Right wrist and groin site look unremarkable. Educated to use the call light, within reach.       Problem: Patient Centered Care  Goal: Patient preferences are id

## 2021-12-22 NOTE — DISCHARGE PLANNING
Patient was provided with discharge instructions, education, and follow up information; patient's wife Clarita Orellana was also present for instructions with patient's consent.  Patient verbalizes understanding of follow up information, specifically next medication

## 2021-12-22 NOTE — PROGRESS NOTES
[facility]    Cardiology Progress Note    Yani Pereira Patient Status:  Observation    10/21/1940 MRN [id number]   Location [facility] 3W/SW Attending Kallie Lund MD   Hosp Day # 0 PCP Johnny Jin MD, Teddy Sommer MD       Impression/Kishor rate and rhythm. S1, S2 normal. No murmur, pericardial rub, S3, thrill, heave or extra cardiac sounds. Lungs: Clear without wheezes, rales, rhonchi or dullness. Normal excursions and effort. Abdomen: Soft, non-tender.  No organosplenomegally, mass or winnie

## 2021-12-22 NOTE — PLAN OF CARE
Problem: Patient Centered Care  Goal: Patient preferences are identified and integrated in the patient's plan of care  Description: Interventions:  - What would you like us to know as we care for you?  From home with my wife  - Provide timely, complete, a medications as ordered  - Initiate emergency measures for life threatening arrhythmias  - Monitor electrolytes and administer replacement therapy as ordered  Outcome: Progressing     Problem: SKIN/TISSUE INTEGRITY - ADULT  Goal: Incision(s), wounds(s) or d

## 2021-12-31 ENCOUNTER — HOSPITAL ENCOUNTER (OUTPATIENT)
Dept: NUCLEAR MEDICINE | Age: 81
Discharge: HOME | End: 2021-12-31
Attending: INTERNAL MEDICINE

## 2021-12-31 DIAGNOSIS — E78.5 DYSLIPIDEMIA: ICD-10-CM

## 2021-12-31 DIAGNOSIS — I48.0 PAROXYSMAL ATRIAL FIBRILLATION (CMD): ICD-10-CM

## 2021-12-31 DIAGNOSIS — I50.32 CHRONIC HEART FAILURE WITH PRESERVED EJECTION FRACTION (CMD): ICD-10-CM

## 2021-12-31 PROCEDURE — 10006150 HB RX 343: Performed by: INTERNAL MEDICINE

## 2021-12-31 PROCEDURE — A9538 TC99M PYROPHOSPHATE: HCPCS | Performed by: INTERNAL MEDICINE

## 2021-12-31 PROCEDURE — 78803 RP LOCLZJ TUM SPECT 1 AREA: CPT

## 2021-12-31 RX ORDER — TECHNETIUM TC99M PYROPHOSPHATE 12 MG/10ML
15 INJECTION INTRAVENOUS ONCE
Status: COMPLETED | OUTPATIENT
Start: 2021-12-31 | End: 2021-12-31

## 2021-12-31 RX ADMIN — TECHNETIUM TC99M PYROPHOSPHATE 18.8 MILLICURIE: 12 INJECTION INTRAVENOUS at 09:15

## 2022-05-26 ENCOUNTER — LAB ENCOUNTER (OUTPATIENT)
Dept: LAB | Age: 82
End: 2022-05-26
Payer: MEDICARE

## 2022-05-26 DIAGNOSIS — R58 ECCHYMOSIS ON EXAMINATION: ICD-10-CM

## 2022-05-26 LAB
BASOPHILS # BLD AUTO: 0.04 X10(3) UL (ref 0–0.2)
BASOPHILS NFR BLD AUTO: 0.7 %
DEPRECATED RDW RBC AUTO: 71.9 FL (ref 35.1–46.3)
EOSINOPHIL # BLD AUTO: 0.19 X10(3) UL (ref 0–0.7)
EOSINOPHIL NFR BLD AUTO: 3.1 %
ERYTHROCYTE [DISTWIDTH] IN BLOOD BY AUTOMATED COUNT: 22.3 % (ref 11–15)
HCT VFR BLD AUTO: 45.1 %
HGB BLD-MCNC: 13.7 G/DL
IMM GRANULOCYTES # BLD AUTO: 0.03 X10(3) UL (ref 0–1)
IMM GRANULOCYTES NFR BLD: 0.5 %
LYMPHOCYTES # BLD AUTO: 0.64 X10(3) UL (ref 1–4)
LYMPHOCYTES NFR BLD AUTO: 10.5 %
MCH RBC QN AUTO: 27.1 PG (ref 26–34)
MCHC RBC AUTO-ENTMCNC: 30.4 G/DL (ref 31–37)
MCV RBC AUTO: 89.3 FL
MONOCYTES # BLD AUTO: 0.82 X10(3) UL (ref 0.1–1)
MONOCYTES NFR BLD AUTO: 13.5 %
NEUTROPHILS # BLD AUTO: 4.36 X10 (3) UL (ref 1.5–7.7)
NEUTROPHILS # BLD AUTO: 4.36 X10(3) UL (ref 1.5–7.7)
NEUTROPHILS NFR BLD AUTO: 71.7 %
PLATELET # BLD AUTO: 200 10(3)UL (ref 150–450)
PLATELET MORPHOLOGY: NORMAL
RBC # BLD AUTO: 5.05 X10(6)UL
WBC # BLD AUTO: 6.1 X10(3) UL (ref 4–11)

## 2022-05-26 PROCEDURE — 36415 COLL VENOUS BLD VENIPUNCTURE: CPT

## 2022-05-26 PROCEDURE — 85025 COMPLETE CBC W/AUTO DIFF WBC: CPT

## 2022-10-19 ENCOUNTER — APPOINTMENT (OUTPATIENT)
Dept: URBAN - METROPOLITAN AREA CLINIC 244 | Age: 82
Setting detail: DERMATOLOGY
End: 2022-10-19

## 2022-10-19 DIAGNOSIS — L81.4 OTHER MELANIN HYPERPIGMENTATION: ICD-10-CM

## 2022-10-19 DIAGNOSIS — D22 MELANOCYTIC NEVI: ICD-10-CM

## 2022-10-19 DIAGNOSIS — L82.1 OTHER SEBORRHEIC KERATOSIS: ICD-10-CM

## 2022-10-19 DIAGNOSIS — L90.5 SCAR CONDITIONS AND FIBROSIS OF SKIN: ICD-10-CM

## 2022-10-19 DIAGNOSIS — L57.0 ACTINIC KERATOSIS: ICD-10-CM

## 2022-10-19 PROBLEM — D22.61 MELANOCYTIC NEVI OF RIGHT UPPER LIMB, INCLUDING SHOULDER: Status: ACTIVE | Noted: 2022-10-19

## 2022-10-19 PROBLEM — C44.519 BASAL CELL CARCINOMA OF SKIN OF OTHER PART OF TRUNK: Status: ACTIVE | Noted: 2022-10-19

## 2022-10-19 PROBLEM — D22.62 MELANOCYTIC NEVI OF LEFT UPPER LIMB, INCLUDING SHOULDER: Status: ACTIVE | Noted: 2022-10-19

## 2022-10-19 PROBLEM — D22.5 MELANOCYTIC NEVI OF TRUNK: Status: ACTIVE | Noted: 2022-10-19

## 2022-10-19 PROBLEM — D48.5 NEOPLASM OF UNCERTAIN BEHAVIOR OF SKIN: Status: ACTIVE | Noted: 2022-10-19

## 2022-10-19 PROCEDURE — 99203 OFFICE O/P NEW LOW 30 MIN: CPT | Mod: 25

## 2022-10-19 PROCEDURE — OTHER LIQUID NITROGEN: OTHER

## 2022-10-19 PROCEDURE — 17004 DESTROY PREMAL LESIONS 15/>: CPT

## 2022-10-19 PROCEDURE — OTHER DEFER: OTHER

## 2022-10-19 PROCEDURE — OTHER COUNSELING: OTHER

## 2022-10-19 ASSESSMENT — LOCATION SIMPLE DESCRIPTION DERM
LOCATION SIMPLE: RIGHT EAR
LOCATION SIMPLE: CHEST
LOCATION SIMPLE: SCALP
LOCATION SIMPLE: LEFT UPPER ARM
LOCATION SIMPLE: LEFT FOREARM
LOCATION SIMPLE: RIGHT HAND
LOCATION SIMPLE: RIGHT UPPER ARM
LOCATION SIMPLE: LEFT LIP
LOCATION SIMPLE: ANTERIOR SCALP

## 2022-10-19 ASSESSMENT — LOCATION ZONE DERM
LOCATION ZONE: ARM
LOCATION ZONE: EAR
LOCATION ZONE: SCALP
LOCATION ZONE: LIP
LOCATION ZONE: HAND
LOCATION ZONE: TRUNK

## 2022-10-19 ASSESSMENT — LOCATION DETAILED DESCRIPTION DERM
LOCATION DETAILED: MID-FRONTAL SCALP
LOCATION DETAILED: MIDDLE STERNUM
LOCATION DETAILED: LEFT VENTRAL PROXIMAL FOREARM
LOCATION DETAILED: RIGHT ANTERIOR DISTAL UPPER ARM
LOCATION DETAILED: LEFT ANTERIOR DISTAL UPPER ARM
LOCATION DETAILED: RIGHT ULNAR DORSAL HAND
LOCATION DETAILED: RIGHT INFERIOR CRUS OF ANTIHELIX
LOCATION DETAILED: RIGHT ANTECUBITAL SKIN
LOCATION DETAILED: RIGHT SUPERIOR PARIETAL SCALP
LOCATION DETAILED: RIGHT MEDIAL SUPERIOR CHEST
LOCATION DETAILED: LEFT CENTRAL PARIETAL SCALP
LOCATION DETAILED: LEFT INFERIOR VERMILION LIP
LOCATION DETAILED: UPPER STERNUM
LOCATION DETAILED: LEFT ANTECUBITAL SKIN

## 2022-10-19 NOTE — PROCEDURE: DEFER
Instructions (Optional): 4mm
Detail Level: Detailed
Introduction Text (Please End With A Colon): The following procedure was deferred:
Size Of Lesion In Cm (Optional): 0
Procedure To Be Performed At Next Visit: Biopsy by punch method
Procedure To Be Performed At Next Visit: Biopsy by shave method

## 2022-10-19 NOTE — PROCEDURE: LIQUID NITROGEN
Render Post-Care Instructions In Note?: no
Total Number Of Aks Treated: 15
Detail Level: Zone
Consent: The patient's consent was obtained including but not limited to risks of crusting, scabbing, blistering, scarring, darker or lighter pigmentary change, recurrence, incomplete removal and infection.
Duration Of Freeze Thaw-Cycle (Seconds): 0
Post-Care Instructions: I reviewed with the patient in detail post-care instructions. Patient is to wear sunprotection, and avoid picking at any of the treated lesions. Pt may apply Vaseline to crusted or scabbing areas.

## 2022-10-26 ENCOUNTER — APPOINTMENT (OUTPATIENT)
Dept: URBAN - METROPOLITAN AREA CLINIC 244 | Age: 82
Setting detail: DERMATOLOGY
End: 2022-10-27

## 2022-10-26 DIAGNOSIS — D22 MELANOCYTIC NEVI: ICD-10-CM

## 2022-10-26 PROBLEM — D48.5 NEOPLASM OF UNCERTAIN BEHAVIOR OF SKIN: Status: ACTIVE | Noted: 2022-10-26

## 2022-10-26 PROCEDURE — 11102 TANGNTL BX SKIN SINGLE LES: CPT | Mod: 59

## 2022-10-26 PROCEDURE — OTHER BIOPSY BY PUNCH METHOD: OTHER

## 2022-10-26 PROCEDURE — 69100 BIOPSY OF EXTERNAL EAR: CPT | Mod: 79

## 2022-10-26 PROCEDURE — OTHER BIOPSY BY SHAVE METHOD: OTHER

## 2022-10-26 ASSESSMENT — LOCATION DETAILED DESCRIPTION DERM: LOCATION DETAILED: RIGHT SUPERIOR CRUS OF ANTIHELIX

## 2022-10-26 ASSESSMENT — LOCATION ZONE DERM: LOCATION ZONE: EAR

## 2022-10-26 ASSESSMENT — LOCATION SIMPLE DESCRIPTION DERM: LOCATION SIMPLE: RIGHT EAR

## 2022-10-26 NOTE — PROCEDURE: BIOPSY BY PUNCH METHOD
X Depth Of Punch In Cm (Optional): 0
Suture Removal: 14 days
Dressing: bandage
Biopsy Type: H and E
Consent: Written consent was obtained and risks were reviewed including but not limited to scarring, infection, bleeding, scabbing, incomplete removal, nerve damage and allergy to anesthesia.
Detail Level: Detailed
Render Path Notes In Note?: No
Validate Note Data (See Information Below): Yes
Epidermal Sutures: 4-0 Nylon
Home Suture Removal Text: Patient will remove their sutures at home.  If they have any questions or difficulties they will call the office.
Wound Care: Petrolatum
Billing Type: Third-Party Bill
Information: Selecting Yes will display possible errors in your note based on the variables you have selected. This validation is only offered as a suggestion for you. PLEASE NOTE THAT THE VALIDATION TEXT WILL BE REMOVED WHEN YOU FINALIZE YOUR NOTE. IF YOU WANT TO FAX A PRELIMINARY NOTE YOU WILL NEED TO TOGGLE THIS TO 'NO' IF YOU DO NOT WANT IT IN YOUR FAXED NOTE.
Anesthesia Volume In Cc (Will Not Render If 0): 0.5
Punch Size In Mm: 4
Post-Care Instructions: I reviewed with the patient in detail post-care instructions. Patient is to keep the biopsy site dry overnight, and then apply petrolatum twice daily until healed. Patient may apply hydrogen peroxide soaks to remove any crusting.
Anesthesia Type: 0.5% lidocaine with 1:200,000 epinephrine and a 1:10 solution of 8.4% sodium bicarbonate
Notification Instructions: Patient will be notified of biopsy results. However, patient instructed to call the office if not contacted within 2 weeks.
Hemostasis: None

## 2022-10-26 NOTE — PROCEDURE: BIOPSY BY SHAVE METHOD
Information: Selecting Yes will display possible errors in your note based on the variables you have selected. This validation is only offered as a suggestion for you. PLEASE NOTE THAT THE VALIDATION TEXT WILL BE REMOVED WHEN YOU FINALIZE YOUR NOTE. IF YOU WANT TO FAX A PRELIMINARY NOTE YOU WILL NEED TO TOGGLE THIS TO 'NO' IF YOU DO NOT WANT IT IN YOUR FAXED NOTE.
Bill For Surgical Tray: no
Anesthesia Volume In Cc (Will Not Render If 0): 0.5
Billing Type: Third-Party Bill
Anesthesia Type: 0.5% lidocaine with 1:200,000 epinephrine and a 1:10 solution of 8.4% sodium bicarbonate
Size Of Lesion In Cm: 0
Silver Nitrate Text: The wound bed was treated with silver nitrate after the biopsy was performed.
Electrodesiccation Text: The wound bed was treated with electrodesiccation after the biopsy was performed.
Biopsy Type: H and E
Type Of Destruction Used: Curettage
Notification Instructions: Patient will be notified of biopsy results. However, patient instructed to call the office if not contacted within 2 weeks.
Dressing: bandage
Detail Level: Detailed
Consent: Written consent was obtained and risks were reviewed including but not limited to scarring, infection, bleeding, scabbing, incomplete removal, nerve damage and allergy to anesthesia.
Biopsy Method: double edge Personna blade
Hemostasis: Drysol
Electrodesiccation And Curettage Text: The wound bed was treated with electrodesiccation and curettage after the biopsy was performed.
Curettage Text: The wound bed was treated with curettage after the biopsy was performed.
Depth Of Biopsy: dermis
Wound Care: Petrolatum
Cryotherapy Text: The wound bed was treated with cryotherapy after the biopsy was performed.
Was A Bandage Applied: Yes
Post-Care Instructions: I reviewed with the patient in detail post-care instructions. Patient is to keep the biopsy site dry overnight, and then apply Petrolatum twice daily until healed, or after a night or two leave area open and dry overnight and a scab will form which will fall off on its own in a few weeks.

## 2022-11-03 ENCOUNTER — APPOINTMENT (OUTPATIENT)
Dept: URBAN - METROPOLITAN AREA CLINIC 244 | Age: 82
Setting detail: DERMATOLOGY
End: 2022-11-04

## 2022-11-03 DIAGNOSIS — Z48.02 ENCOUNTER FOR REMOVAL OF SUTURES: ICD-10-CM

## 2022-11-03 DIAGNOSIS — L29.8 OTHER PRURITUS: ICD-10-CM

## 2022-11-03 DIAGNOSIS — L21.8 OTHER SEBORRHEIC DERMATITIS: ICD-10-CM

## 2022-11-03 DIAGNOSIS — L72.8 OTHER FOLLICULAR CYSTS OF THE SKIN AND SUBCUTANEOUS TISSUE: ICD-10-CM

## 2022-11-03 DIAGNOSIS — L90.5 SCAR CONDITIONS AND FIBROSIS OF SKIN: ICD-10-CM

## 2022-11-03 PROCEDURE — OTHER PRESCRIPTION: OTHER

## 2022-11-03 PROCEDURE — OTHER COUNSELING: OTHER

## 2022-11-03 PROCEDURE — OTHER SUTURE REMOVAL (NO GLOBAL PERIOD): OTHER

## 2022-11-03 PROCEDURE — 99214 OFFICE O/P EST MOD 30 MIN: CPT

## 2022-11-03 PROCEDURE — OTHER ADDITIONAL NOTES: OTHER

## 2022-11-03 RX ORDER — TRIAMCINOLONE ACETONIDE 1 MG/G
CREAM TOPICAL
Qty: 30 | Refills: 1 | Status: ERX | COMMUNITY
Start: 2022-11-03

## 2022-11-03 RX ORDER — KETOCONAZOLE 20 MG/ML
SHAMPOO, SUSPENSION TOPICAL
Qty: 240 | Refills: 10 | Status: ERX | COMMUNITY
Start: 2022-11-03

## 2022-11-03 ASSESSMENT — LOCATION ZONE DERM
LOCATION ZONE: TRUNK
LOCATION ZONE: SCALP
LOCATION ZONE: EAR

## 2022-11-03 ASSESSMENT — LOCATION SIMPLE DESCRIPTION DERM
LOCATION SIMPLE: CHEST
LOCATION SIMPLE: RIGHT EAR
LOCATION SIMPLE: RIGHT SCALP
LOCATION SIMPLE: SCALP

## 2022-11-03 ASSESSMENT — LOCATION DETAILED DESCRIPTION DERM
LOCATION DETAILED: RIGHT LATERAL SUPERIOR CHEST
LOCATION DETAILED: LEFT SUPERIOR PARIETAL SCALP
LOCATION DETAILED: RIGHT MEDIAL FRONTAL SCALP
LOCATION DETAILED: RIGHT ANTIHELIX

## 2023-07-19 ENCOUNTER — HOSPITAL ENCOUNTER (INPATIENT)
Facility: HOSPITAL | Age: 83
LOS: 2 days | Discharge: HOME | DRG: 378 | End: 2023-07-21
Attending: EMERGENCY MEDICINE | Admitting: HOSPITALIST
Payer: MEDICARE

## 2023-07-19 ENCOUNTER — HOSPITAL ENCOUNTER (INPATIENT)
Facility: HOSPITAL | Age: 83
LOS: 2 days | Discharge: HOME | End: 2023-07-21
Attending: EMERGENCY MEDICINE | Admitting: HOSPITALIST
Payer: MEDICARE

## 2023-07-19 DIAGNOSIS — K92.2 GASTROINTESTINAL HEMORRHAGE, UNSPECIFIED GASTROINTESTINAL HEMORRHAGE TYPE: Primary | ICD-10-CM

## 2023-07-19 LAB
ANION GAP SERPL CALC-SCNC: 6 MMOL/L (ref 0–18)
BASOPHILS # BLD AUTO: 0.05 X10(3) UL (ref 0–0.2)
BASOPHILS NFR BLD AUTO: 0.8 %
BUN BLD-MCNC: 27 MG/DL (ref 7–18)
BUN/CREAT SERPL: 17.3 (ref 10–20)
CALCIUM BLD-MCNC: 8.9 MG/DL (ref 8.5–10.1)
CHLORIDE SERPL-SCNC: 113 MMOL/L (ref 98–112)
CO2 SERPL-SCNC: 27 MMOL/L (ref 21–32)
CREAT BLD-MCNC: 1.56 MG/DL
DEPRECATED RDW RBC AUTO: 53.1 FL (ref 35.1–46.3)
EOSINOPHIL # BLD AUTO: 0.29 X10(3) UL (ref 0–0.7)
EOSINOPHIL NFR BLD AUTO: 4.9 %
ERYTHROCYTE [DISTWIDTH] IN BLOOD BY AUTOMATED COUNT: 19.9 % (ref 11–15)
GFR SERPLBLD BASED ON 1.73 SQ M-ARVRAT: 45 ML/MIN/1.73M2 (ref 60–?)
GLUCOSE BLD-MCNC: 85 MG/DL (ref 70–99)
HCT VFR BLD AUTO: 32.4 %
HGB BLD-MCNC: 8.4 G/DL
IMM GRANULOCYTES # BLD AUTO: 0.03 X10(3) UL (ref 0–1)
IMM GRANULOCYTES NFR BLD: 0.5 %
LYMPHOCYTES # BLD AUTO: 0.93 X10(3) UL (ref 1–4)
LYMPHOCYTES NFR BLD AUTO: 15.6 %
MCH RBC QN AUTO: 19.2 PG (ref 26–34)
MCHC RBC AUTO-ENTMCNC: 25.9 G/DL (ref 31–37)
MCV RBC AUTO: 74 FL
MONOCYTES # BLD AUTO: 1.03 X10(3) UL (ref 0.1–1)
MONOCYTES NFR BLD AUTO: 17.3 %
NEUTROPHILS # BLD AUTO: 3.64 X10 (3) UL (ref 1.5–7.7)
NEUTROPHILS # BLD AUTO: 3.64 X10(3) UL (ref 1.5–7.7)
NEUTROPHILS NFR BLD AUTO: 60.9 %
OSMOLALITY SERPL CALC.SUM OF ELEC: 306 MOSM/KG (ref 275–295)
PLATELET # BLD AUTO: 269 10(3)UL (ref 150–450)
POTASSIUM SERPL-SCNC: 4.5 MMOL/L (ref 3.5–5.1)
RBC # BLD AUTO: 4.38 X10(6)UL
SODIUM SERPL-SCNC: 146 MMOL/L (ref 136–145)
WBC # BLD AUTO: 6 X10(3) UL (ref 4–11)

## 2023-07-19 PROCEDURE — 5A09357 ASSISTANCE WITH RESPIRATORY VENTILATION, LESS THAN 24 CONSECUTIVE HOURS, CONTINUOUS POSITIVE AIRWAY PRESSURE: ICD-10-PCS | Performed by: HOSPITALIST

## 2023-07-19 PROCEDURE — 99223 1ST HOSP IP/OBS HIGH 75: CPT | Performed by: HOSPITALIST

## 2023-07-19 RX ORDER — TEMAZEPAM 15 MG/1
15 CAPSULE ORAL NIGHTLY PRN
Status: DISCONTINUED | OUTPATIENT
Start: 2023-07-19 | End: 2023-07-21

## 2023-07-19 RX ORDER — ACETAMINOPHEN 500 MG
500 TABLET ORAL EVERY 4 HOURS PRN
Status: DISCONTINUED | OUTPATIENT
Start: 2023-07-19 | End: 2023-07-21

## 2023-07-19 RX ORDER — METOCLOPRAMIDE HYDROCHLORIDE 5 MG/ML
5 INJECTION INTRAMUSCULAR; INTRAVENOUS EVERY 8 HOURS PRN
Status: DISCONTINUED | OUTPATIENT
Start: 2023-07-19 | End: 2023-07-21

## 2023-07-19 RX ORDER — SODIUM CHLORIDE 9 MG/ML
INJECTION, SOLUTION INTRAVENOUS CONTINUOUS
Status: DISCONTINUED | OUTPATIENT
Start: 2023-07-19 | End: 2023-07-21

## 2023-07-19 RX ORDER — ONDANSETRON 2 MG/ML
4 INJECTION INTRAMUSCULAR; INTRAVENOUS EVERY 6 HOURS PRN
Status: DISCONTINUED | OUTPATIENT
Start: 2023-07-19 | End: 2023-07-21

## 2023-07-19 NOTE — H&P
[facility]    PATIENT'S NAME: Vida Singletary   ATTENDING PHYSICIAN: Rosalba Baker. Isaiah Rivera MD   PATIENT ACCOUNT#:   [id number]    LOCATION:  [place] [address]  MEDICAL RECORD #:   [id number]       YOB: 1940  ADMISSION DATE:       07/19/2023    HISTORY AND PHYSICAL EXAMINATION    CHIEF COMPLAINT:  Melena, gastrointestinal bleed. HISTORY OF PRESENT ILLNESS:  Patient is an 82-year-old  male with history of paroxysmal atrial fibrillation, anticoagulated with Eliquis, who presented to the emergency department for evaluation of melena bowel movements for last 3 days. CBC showed hemoglobin of 8.3 which has dropped from baseline of 13, MCV 74.0. Chemistry showed GFR of 45 which is at baseline. Patient was started on IV Protonix and he will be admitted to the hospital for further management. PAST MEDICAL HISTORY:  History of peptic ulcer disease. Last EGD on record May 2023 showed hiatal hernia; healing peptic ulcer disease, gastric fundus and duodenum. Also he had chronic rectal radiation proctitis, treated with APC at that time without active bleed. Also had history of hypertension, morbid obesity, obstructive sleep apnea, diverticulosis, bipolar affective disorder, pulmonary embolism. He had a history of paroxysmal atrial fibrillation, anticoagulated with Eliquis and history of cerebrovascular accident. Because of the recurrence of his gastrointestinal, he was evaluated recently for Watchman device deployment, history of prostate cancer status post external beam radiation therapy with chronic radiation proctitis. PAST SURGICAL HISTORY:  Appendectomy, carpal tunnel release. MEDICATIONS:  Please see medication reconciliation list.  He took his Eliquis last time last night. ALLERGIES:  Radiology contrast.    FAMILY HISTORY:  Positive for heart disease and hypertension. SOCIAL HISTORY:  Ex-tobacco user. Social alcohol. No drug use. Lives with his family.   Independent in his basic activities of daily living. REVIEW OF SYSTEMS:  Patient said for last few days, he has been feeling a bit uncomfortable when he eats. Sometimes he will have loose bowel movements if he eats something. He has been feeling a bit lightheaded and fatigued. He has been noticing dark to black bowel movements for last 3 days. No chest pain. Other 12-point review of systems is negative. PHYSICAL EXAMINATION:    GENERAL:  Alert and oriented to time, place, and person. No acute distress, appears pale in color. VITAL SIGNS:  Temperature 98.0, pulse 54, respiratory rate 18, blood pressure 157/69, pulse ox 96% on room air. HEENT:  Atraumatic. Oropharynx clear. Moist mucous membranes. Normal hard and soft palate. Eyes:  Anicteric sclerae. NECK:  Supple. No lymphadenopathy. Trachea midline. Full range of motion. LUNGS:  Clear to auscultation bilaterally. Normal respiratory effort. HEART:  Regular rate, rhythm. S1 and S2 auscultated. No murmur. ABDOMEN:  Soft, nondistended. Obese. Positive bowel sounds. No distension. No tenderness. EXTREMITIES:  No peripheral edema, clubbing, or cyanosis. NEUROLOGIC:  Motor and sensory intact. ASSESSMENT AND PLAN:    1. Melena gastrointestinal bleed, most likely source upper gastrointestinal tract. 2.   History of paroxysmal atrial fibrillation, anticoagulated with Eliquis. 3.   Morbid obesity. 4.   Acute posthemorrhagic anemia. 5.   Chronic kidney disease stage 3. Patient will be admitted to general medical floor. Remote telemetry. Start him on IV Protonix. Hold Eliquis. Gentle hydration. Monitor his hemodynamic status. Transfuse if his hemoglobin drops. Obtain cardiology and gastroenterology consult. Keep him on clear liquid diet for now. Further recommendations to follow.      Dictated By Jillian Hyman MD  d: 07/19/2023 17:24:18  t: 07/19/2023 17:35:02  Job [id number]  ZT/

## 2023-07-19 NOTE — ED QUICK NOTES
Orders for admission, patient is aware of plan and ready to go upstairs. Any questions, please call ED RN Roque Fields  at extension [phone].    Type of COVID test sent:None  COVID Suspicion level: Low    Titratable drug(s) infusing:N/A  Rate:    LOC at time of transport:A&O x 4    Other pertinent information    CIWA score=  NIH score=

## 2023-07-19 NOTE — ED QUICK NOTES
Pt presents with wife for eval of abnormal lab. Pt states last week he had labs drawn and was told his hgb was 8.6. +Eliquis. Admits to dyspnea and having black stools. Denies any CP.  Here for further eval.

## 2023-07-19 NOTE — ED INITIAL ASSESSMENT (HPI)
[de-identified] y/o male here for eval of dark tarry stools x 3 days. Reports having his hgb checked last week and it was 8.3, and again today- result is not back. His PCP and GI physician instructed him to come to the ER with hx of ulcer from scope in May. Denies SOB/CP.

## 2023-07-20 ENCOUNTER — ANESTHESIA EVENT (OUTPATIENT)
Dept: ENDOSCOPY | Facility: HOSPITAL | Age: 83
DRG: 378 | End: 2023-07-20
Payer: MEDICARE

## 2023-07-20 ENCOUNTER — ANESTHESIA (OUTPATIENT)
Dept: ENDOSCOPY | Facility: HOSPITAL | Age: 83
DRG: 378 | End: 2023-07-20
Payer: MEDICARE

## 2023-07-20 ENCOUNTER — ANESTHESIA EVENT (OUTPATIENT)
Dept: ENDOSCOPY | Facility: HOSPITAL | Age: 83
End: 2023-07-20
Payer: MEDICARE

## 2023-07-20 ENCOUNTER — ANESTHESIA (OUTPATIENT)
Dept: ENDOSCOPY | Facility: HOSPITAL | Age: 83
End: 2023-07-20
Payer: MEDICARE

## 2023-07-20 LAB
ANION GAP SERPL CALC-SCNC: 7 MMOL/L (ref 0–18)
BASOPHILS # BLD AUTO: 0.04 X10(3) UL (ref 0–0.2)
BASOPHILS NFR BLD AUTO: 0.8 %
BUN BLD-MCNC: 20 MG/DL (ref 7–18)
BUN/CREAT SERPL: 14.8 (ref 10–20)
CALCIUM BLD-MCNC: 8.2 MG/DL (ref 8.5–10.1)
CHLORIDE SERPL-SCNC: 112 MMOL/L (ref 98–112)
CO2 SERPL-SCNC: 26 MMOL/L (ref 21–32)
CREAT BLD-MCNC: 1.35 MG/DL
DEPRECATED RDW RBC AUTO: 50.9 FL (ref 35.1–46.3)
EOSINOPHIL # BLD AUTO: 0.29 X10(3) UL (ref 0–0.7)
EOSINOPHIL NFR BLD AUTO: 6.1 %
ERYTHROCYTE [DISTWIDTH] IN BLOOD BY AUTOMATED COUNT: 19.9 % (ref 11–15)
GFR SERPLBLD BASED ON 1.73 SQ M-ARVRAT: 53 ML/MIN/1.73M2 (ref 60–?)
GLUCOSE BLD-MCNC: 114 MG/DL (ref 70–99)
HCT VFR BLD AUTO: 28.4 %
HGB BLD-MCNC: 7.7 G/DL
IMM GRANULOCYTES # BLD AUTO: 0.01 X10(3) UL (ref 0–1)
IMM GRANULOCYTES NFR BLD: 0.2 %
LYMPHOCYTES # BLD AUTO: 0.83 X10(3) UL (ref 1–4)
LYMPHOCYTES NFR BLD AUTO: 17.4 %
MCH RBC QN AUTO: 19.4 PG (ref 26–34)
MCHC RBC AUTO-ENTMCNC: 27.1 G/DL (ref 31–37)
MCV RBC AUTO: 71.5 FL
MONOCYTES # BLD AUTO: 0.66 X10(3) UL (ref 0.1–1)
MONOCYTES NFR BLD AUTO: 13.9 %
NEUTROPHILS # BLD AUTO: 2.93 X10 (3) UL (ref 1.5–7.7)
NEUTROPHILS # BLD AUTO: 2.93 X10(3) UL (ref 1.5–7.7)
NEUTROPHILS NFR BLD AUTO: 61.6 %
OSMOLALITY SERPL CALC.SUM OF ELEC: 303 MOSM/KG (ref 275–295)
PLATELET # BLD AUTO: 226 10(3)UL (ref 150–450)
POTASSIUM SERPL-SCNC: 3.7 MMOL/L (ref 3.5–5.1)
RBC # BLD AUTO: 3.97 X10(6)UL
SODIUM SERPL-SCNC: 145 MMOL/L (ref 136–145)
WBC # BLD AUTO: 4.8 X10(3) UL (ref 4–11)

## 2023-07-20 PROCEDURE — 0DJ07ZZ INSPECTION OF UPPER INTESTINAL TRACT, VIA NATURAL OR ARTIFICIAL OPENING: ICD-10-PCS | Performed by: INTERNAL MEDICINE

## 2023-07-20 PROCEDURE — 99233 SBSQ HOSP IP/OBS HIGH 50: CPT | Performed by: HOSPITALIST

## 2023-07-20 PROCEDURE — 0W3P8ZZ CONTROL BLEEDING IN GASTROINTESTINAL TRACT, VIA NATURAL OR ARTIFICIAL OPENING ENDOSCOPIC: ICD-10-PCS | Performed by: INTERNAL MEDICINE

## 2023-07-20 RX ORDER — LIDOCAINE HYDROCHLORIDE 10 MG/ML
INJECTION, SOLUTION EPIDURAL; INFILTRATION; INTRACAUDAL; PERINEURAL AS NEEDED
Status: DISCONTINUED | OUTPATIENT
Start: 2023-07-20 | End: 2023-07-20 | Stop reason: SURG

## 2023-07-20 RX ORDER — SODIUM CHLORIDE, SODIUM LACTATE, POTASSIUM CHLORIDE, CALCIUM CHLORIDE 600; 310; 30; 20 MG/100ML; MG/100ML; MG/100ML; MG/100ML
INJECTION, SOLUTION INTRAVENOUS CONTINUOUS PRN
Status: DISCONTINUED | OUTPATIENT
Start: 2023-07-20 | End: 2023-07-20 | Stop reason: SURG

## 2023-07-20 RX ADMIN — SODIUM CHLORIDE, SODIUM LACTATE, POTASSIUM CHLORIDE, CALCIUM CHLORIDE: 600; 310; 30; 20 INJECTION, SOLUTION INTRAVENOUS at 13:29:00

## 2023-07-20 RX ADMIN — LIDOCAINE HYDROCHLORIDE 50 MG: 10 INJECTION, SOLUTION EPIDURAL; INFILTRATION; INTRACAUDAL; PERINEURAL at 13:33:00

## 2023-07-20 RX ADMIN — SODIUM CHLORIDE, SODIUM LACTATE, POTASSIUM CHLORIDE, CALCIUM CHLORIDE: 600; 310; 30; 20 INJECTION, SOLUTION INTRAVENOUS at 13:49:00

## 2023-07-20 NOTE — RESPIRATORY THERAPY NOTE
Pt stated he does have a diagnosis ESTHER uses a home Cpap machine - pt will be placed on ou Cpap machine while here.

## 2023-07-20 NOTE — PLAN OF CARE
Pt admitted from ED, A&Ox4, ambulates in the room. On RA, uses CPAP at night. IVF per MD order, on CLD. GI on consult. Oriented to the unit. Safety precautions in place, call light within reach. Problem: Patient Centered Care  Goal: Patient preferences are identified and integrated in the patient's plan of care  Description: Interventions:  - What would you like us to know as we care for you?  I live at home with my wife    - Provide timely, complete, and accurate information to patient/family  - Incorporate patient and family knowledge, values, beliefs, and cultural backgrounds into the planning and delivery of care  - Encourage patient/family to participate in care and decision-making at the level they choose  - Honor patient and family perspectives and choices  Outcome: Progressing     Problem: Patient/Family Goals  Goal: Patient/Family Long Term Goal  Description: Patient's Long Term Goal: go home    Interventions:  - follow MD orders  -Hydration  -diagnostic testing  - See additional Care Plan goals for specific interventions  Outcome: Progressing  Goal: Patient/Family Short Term Goal  Description: Patient's Short Term Goal:      Interventions:   -    - See additional Care Plan goals for specific interventions  Outcome: Progressing     Problem: PAIN - ADULT  Goal: Verbalizes/displays adequate comfort level or patient's stated pain goal  Description: INTERVENTIONS:  - Encourage pt to monitor pain and request assistance  - Assess pain using appropriate pain scale  - Administer analgesics based on type and severity of pain and evaluate response  - Implement non-pharmacological measures as appropriate and evaluate response  - Consider cultural and social influences on pain and pain management  - Manage/alleviate anxiety  - Utilize distraction and/or relaxation techniques  - Monitor for opioid side effects  - Notify MD/LIP if interventions unsuccessful or patient reports new pain  - Anticipate increased pain with activity and pre-medicate as appropriate  Outcome: Progressing     Problem: SAFETY ADULT - FALL  Goal: Free from fall injury  Description: INTERVENTIONS:  - Assess pt frequently for physical needs  - Identify cognitive and physical deficits and behaviors that affect risk of falls.   - [place] fall precautions as indicated by assessment.  - Educate pt/family on patient safety including physical limitations  - Instruct pt to call for assistance with activity based on assessment  - Modify environment to reduce risk of injury  - Provide assistive devices as appropriate  - Consider OT/PT consult to assist with strengthening/mobility  - Encourage toileting schedule  Outcome: Progressing     Problem: DISCHARGE PLANNING  Goal: Discharge to home or other facility with appropriate resources  Description: INTERVENTIONS:  - Identify barriers to discharge w/pt and caregiver  - Include patient/family/discharge partner in discharge planning  - Arrange for needed discharge resources and transportation as appropriate  - Identify discharge learning needs (meds, wound care, etc)  - Arrange for interpreters to assist at discharge as needed  - Consider post-discharge preferences of patient/family/discharge partner  - Complete POLST form as appropriate  - Assess patient's ability to be responsible for managing their own health  - Refer to Case Management Department for coordinating discharge planning if the patient needs post-hospital services based on physician/LIP order or complex needs related to functional status, cognitive ability or social support system  Outcome: Progressing     Problem: HEMATOLOGIC - ADULT  Goal: Maintains hematologic stability  Description: INTERVENTIONS  - Assess for signs and symptoms of bleeding or hemorrhage  - Monitor labs and vital signs for trends  - Administer supportive blood products/factors, fluids and medications as ordered and appropriate  - Administer supportive blood products/factors as ordered and appropriate  Outcome: Progressing  Goal: Free from bleeding injury  Description: (Example usage: patient with low platelets)  INTERVENTIONS:  - Avoid intramuscular injections, enemas and rectal medication administration  - Ensure safe mobilization of patient  - Hold pressure on venipuncture sites to achieve adequate hemostasis  - Assess for signs and symptoms of internal bleeding  - Monitor lab trends  - Patient is to report abnormal signs of bleeding to staff  - Avoid use of toothpicks and dental floss  - Use electric shaver for shaving  - Use soft bristle tooth brush  - Limit straining and forceful nose blowing  Outcome: Progressing

## 2023-07-20 NOTE — PLAN OF CARE
Problem: Patient Centered Care  Goal: Patient preferences are identified and integrated in the patient's plan of care  Description: Interventions:  - What would you like us to know as we care for you?  I live at home with my wife    - Provide timely, complete, and accurate information to patient/family  - Incorporate patient and family knowledge, values, beliefs, and cultural backgrounds into the planning and delivery of care  - Encourage patient/family to participate in care and decision-making at the level they choose  - Honor patient and family perspectives and choices  Outcome: Progressing     Problem: Patient/Family Goals  Goal: Patient/Family Long Term Goal  Description: Patient's Long Term Goal: go home    Interventions:  - follow MD orders  -Hydration  -diagnostic testing  - See additional Care Plan goals for specific interventions  Outcome: Progressing  Goal: Patient/Family Short Term Goal  Description: Patient's Short Term Goal:      Interventions:   -    - See additional Care Plan goals for specific interventions  Outcome: Progressing     Problem: PAIN - ADULT  Goal: Verbalizes/displays adequate comfort level or patient's stated pain goal  Description: INTERVENTIONS:  - Encourage pt to monitor pain and request assistance  - Assess pain using appropriate pain scale  - Administer analgesics based on type and severity of pain and evaluate response  - Implement non-pharmacological measures as appropriate and evaluate response  - Consider cultural and social influences on pain and pain management  - Manage/alleviate anxiety  - Utilize distraction and/or relaxation techniques  - Monitor for opioid side effects  - Notify MD/LIP if interventions unsuccessful or patient reports new pain  - Anticipate increased pain with activity and pre-medicate as appropriate  Outcome: Progressing     Problem: SAFETY ADULT - FALL  Goal: Free from fall injury  Description: INTERVENTIONS:  - Assess pt frequently for physical needs  - Identify cognitive and physical deficits and behaviors that affect risk of falls.   - [place] fall precautions as indicated by assessment.  - Educate pt/family on patient safety including physical limitations  - Instruct pt to call for assistance with activity based on assessment  - Modify environment to reduce risk of injury  - Provide assistive devices as appropriate  - Consider OT/PT consult to assist with strengthening/mobility  - Encourage toileting schedule  Outcome: Progressing     Problem: DISCHARGE PLANNING  Goal: Discharge to home or other facility with appropriate resources  Description: INTERVENTIONS:  - Identify barriers to discharge w/pt and caregiver  - Include patient/family/discharge partner in discharge planning  - Arrange for needed discharge resources and transportation as appropriate  - Identify discharge learning needs (meds, wound care, etc)  - Arrange for interpreters to assist at discharge as needed  - Consider post-discharge preferences of patient/family/discharge partner  - Complete POLST form as appropriate  - Assess patient's ability to be responsible for managing their own health  - Refer to Case Management Department for coordinating discharge planning if the patient needs post-hospital services based on physician/LIP order or complex needs related to functional status, cognitive ability or social support system  Outcome: Progressing     Problem: HEMATOLOGIC - ADULT  Goal: Maintains hematologic stability  Description: INTERVENTIONS  - Assess for signs and symptoms of bleeding or hemorrhage  - Monitor labs and vital signs for trends  - Administer supportive blood products/factors, fluids and medications as ordered and appropriate  - Administer supportive blood products/factors as ordered and appropriate  Outcome: Progressing  Goal: Free from bleeding injury  Description: (Example usage: patient with low platelets)  INTERVENTIONS:  - Avoid intramuscular injections, enemas and rectal medication administration  - Ensure safe mobilization of patient  - Hold pressure on venipuncture sites to achieve adequate hemostasis  - Assess for signs and symptoms of internal bleeding  - Monitor lab trends  - Patient is to report abnormal signs of bleeding to staff  - Avoid use of toothpicks and dental floss  - Use electric shaver for shaving  - Use soft bristle tooth brush  - Limit straining and forceful nose blowing  Outcome: Progressing

## 2023-07-20 NOTE — ED QUICK NOTES
Pt in stable condition. Pt is A&O x 4 speaking in complete coherent sentences. RR even and unlabored. Pt denies any questions or concerns regarding admission. Pts son Gautam Arreguin updated.

## 2023-07-21 VITALS
BODY MASS INDEX: 38.01 KG/M2 | RESPIRATION RATE: 18 BRPM | HEIGHT: 68 IN | SYSTOLIC BLOOD PRESSURE: 147 MMHG | OXYGEN SATURATION: 94 % | TEMPERATURE: 98 F | DIASTOLIC BLOOD PRESSURE: 85 MMHG | WEIGHT: 250.81 LBS | HEART RATE: 74 BPM

## 2023-07-21 LAB
ANION GAP SERPL CALC-SCNC: 6 MMOL/L (ref 0–18)
BASOPHILS # BLD AUTO: 0.03 X10(3) UL (ref 0–0.2)
BASOPHILS NFR BLD AUTO: 0.5 %
BUN BLD-MCNC: 16 MG/DL (ref 7–18)
BUN/CREAT SERPL: 12.3 (ref 10–20)
CALCIUM BLD-MCNC: 8.5 MG/DL (ref 8.5–10.1)
CHLORIDE SERPL-SCNC: 111 MMOL/L (ref 98–112)
CO2 SERPL-SCNC: 26 MMOL/L (ref 21–32)
CREAT BLD-MCNC: 1.3 MG/DL
DEPRECATED RDW RBC AUTO: 49.6 FL (ref 35.1–46.3)
EGFRCR SERPLBLD CKD-EPI 2021: 56 ML/MIN/1.73M2 (ref 60–?)
EOSINOPHIL # BLD AUTO: 0.23 X10(3) UL (ref 0–0.7)
EOSINOPHIL NFR BLD AUTO: 3.5 %
ERYTHROCYTE [DISTWIDTH] IN BLOOD BY AUTOMATED COUNT: 19.9 % (ref 11–15)
GLUCOSE BLD-MCNC: 107 MG/DL (ref 70–99)
HCT VFR BLD AUTO: 31 %
HGB BLD-MCNC: 8.4 G/DL
IMM GRANULOCYTES # BLD AUTO: 0.04 X10(3) UL (ref 0–1)
IMM GRANULOCYTES NFR BLD: 0.6 %
LYMPHOCYTES # BLD AUTO: 0.73 X10(3) UL (ref 1–4)
LYMPHOCYTES NFR BLD AUTO: 11 %
MAGNESIUM SERPL-MCNC: 2 MG/DL (ref 1.6–2.6)
MCH RBC QN AUTO: 19.1 PG (ref 26–34)
MCHC RBC AUTO-ENTMCNC: 27.1 G/DL (ref 31–37)
MCV RBC AUTO: 70.6 FL
MONOCYTES # BLD AUTO: 0.83 X10(3) UL (ref 0.1–1)
MONOCYTES NFR BLD AUTO: 12.5 %
NEUTROPHILS # BLD AUTO: 4.76 X10 (3) UL (ref 1.5–7.7)
NEUTROPHILS # BLD AUTO: 4.76 X10(3) UL (ref 1.5–7.7)
NEUTROPHILS NFR BLD AUTO: 71.9 %
OSMOLALITY SERPL CALC.SUM OF ELEC: 298 MOSM/KG (ref 275–295)
PHOSPHATE SERPL-MCNC: 2.8 MG/DL (ref 2.5–4.9)
PLATELET # BLD AUTO: 232 10(3)UL (ref 150–450)
POTASSIUM SERPL-SCNC: 3.4 MMOL/L (ref 3.5–5.1)
RBC # BLD AUTO: 4.39 X10(6)UL
SODIUM SERPL-SCNC: 143 MMOL/L (ref 136–145)
WBC # BLD AUTO: 6.6 X10(3) UL (ref 4–11)

## 2023-07-21 PROCEDURE — 99239 HOSP IP/OBS DSCHRG MGMT >30: CPT | Performed by: HOSPITALIST

## 2023-07-21 RX ORDER — POTASSIUM CHLORIDE 20 MEQ/1
40 TABLET, EXTENDED RELEASE ORAL ONCE
Status: DISCONTINUED | OUTPATIENT
Start: 2023-07-21 | End: 2023-07-21

## 2023-07-21 RX ORDER — METHYLPHENIDATE HYDROCHLORIDE 5 MG/1
5 TABLET ORAL DAILY
Status: DISCONTINUED | OUTPATIENT
Start: 2023-07-21 | End: 2023-07-21

## 2023-07-21 RX ORDER — NICOTINE POLACRILEX 4 MG/1
20 GUM, CHEWING ORAL 2 TIMES DAILY
Qty: 60 TABLET | Refills: 0 | Status: SHIPPED | OUTPATIENT
Start: 2023-07-21 | End: 2023-08-20

## 2023-07-21 RX ORDER — TAMSULOSIN HYDROCHLORIDE 0.4 MG/1
0.4 CAPSULE ORAL DAILY
Status: DISCONTINUED | OUTPATIENT
Start: 2023-07-21 | End: 2023-07-21

## 2023-07-21 RX ORDER — DULOXETIN HYDROCHLORIDE 30 MG/1
30 CAPSULE, DELAYED RELEASE ORAL DAILY
Status: DISCONTINUED | OUTPATIENT
Start: 2023-07-21 | End: 2023-07-21

## 2023-07-21 RX ORDER — DULOXETIN HYDROCHLORIDE 60 MG/1
60 CAPSULE, DELAYED RELEASE ORAL DAILY
Status: DISCONTINUED | OUTPATIENT
Start: 2023-07-21 | End: 2023-07-21

## 2023-07-21 RX ORDER — ROSUVASTATIN CALCIUM 20 MG/1
20 TABLET, COATED ORAL DAILY
Status: DISCONTINUED | OUTPATIENT
Start: 2023-07-21 | End: 2023-07-21

## 2023-07-21 RX ORDER — POTASSIUM CHLORIDE 20 MEQ/1
20 TABLET, EXTENDED RELEASE ORAL DAILY
Status: DISCONTINUED | OUTPATIENT
Start: 2023-07-21 | End: 2023-07-21

## 2023-07-21 RX ORDER — BUMETANIDE 1 MG/1
1 TABLET ORAL EVERY OTHER DAY
Status: DISCONTINUED | OUTPATIENT
Start: 2023-07-23 | End: 2023-07-21

## 2023-07-21 RX ORDER — ACETAMINOPHEN 500 MG
500 TABLET ORAL EVERY 6 HOURS PRN
Qty: 1 TABLET | Refills: 0 | Status: SHIPPED | COMMUNITY
Start: 2023-07-21

## 2023-07-21 RX ORDER — POTASSIUM CHLORIDE 20 MEQ/1
40 TABLET, EXTENDED RELEASE ORAL ONCE
Status: COMPLETED | OUTPATIENT
Start: 2023-07-21 | End: 2023-07-21

## 2023-07-21 RX ORDER — METOPROLOL SUCCINATE 25 MG/1
25 TABLET, EXTENDED RELEASE ORAL DAILY
Status: DISCONTINUED | OUTPATIENT
Start: 2023-07-21 | End: 2023-07-21

## 2023-07-21 NOTE — PROGRESS NOTES
Patient okay to be discharged per MD order, all discharge instructions discussed with pt at bedside, all questions answered, peripheral IV removed after Venofer infusion completed. Pt assisted to hospital entrance via wheelchair by staff, pt discharged with all belongings and picked up by family, stable at time of discharge.

## 2023-07-21 NOTE — DISCHARGE SUMMARY
Dc summary#[id number]  > 30 min spent on [address] Discharge Diagnoses: ugi blood loss anemia    Lace+ Score: 61  59-90 High Risk  29-58 Medium Risk  0-28   Low Risk. TCM Follow-Up Recommendation:  LACE 29-58:  Moderate Risk of readmission after discharge from the hospital.tcm fu recommended

## 2023-07-21 NOTE — PLAN OF CARE
Problem: Patient Centered Care  Goal: Patient preferences are identified and integrated in the patient's plan of care  Description: Interventions:  - What would you like us to know as we care for you?  I live at home with my wife    - Provide timely, complete, and accurate information to patient/family  - Incorporate patient and family knowledge, values, beliefs, and cultural backgrounds into the planning and delivery of care  - Encourage patient/family to participate in care and decision-making at the level they choose  - Honor patient and family perspectives and choices  Outcome: Progressing     Problem: Patient/Family Goals  Goal: Patient/Family Long Term Goal  Description: Patient's Long Term Goal: go home    Interventions:  - follow MD orders  -Hydration  -diagnostic testing  - See additional Care Plan goals for specific interventions  Outcome: Progressing  Goal: Patient/Family Short Term Goal  Description: Patient's Short Term Goal:      Interventions:   -    - See additional Care Plan goals for specific interventions  Outcome: Progressing     Problem: PAIN - ADULT  Goal: Verbalizes/displays adequate comfort level or patient's stated pain goal  Description: INTERVENTIONS:  - Encourage pt to monitor pain and request assistance  - Assess pain using appropriate pain scale  - Administer analgesics based on type and severity of pain and evaluate response  - Implement non-pharmacological measures as appropriate and evaluate response  - Consider cultural and social influences on pain and pain management  - Manage/alleviate anxiety  - Utilize distraction and/or relaxation techniques  - Monitor for opioid side effects  - Notify MD/LIP if interventions unsuccessful or patient reports new pain  - Anticipate increased pain with activity and pre-medicate as appropriate  Outcome: Progressing     Problem: SAFETY ADULT - FALL  Goal: Free from fall injury  Description: INTERVENTIONS:  - Assess pt frequently for physical needs  - Identify cognitive and physical deficits and behaviors that affect risk of falls.   - [place] fall precautions as indicated by assessment.  - Educate pt/family on patient safety including physical limitations  - Instruct pt to call for assistance with activity based on assessment  - Modify environment to reduce risk of injury  - Provide assistive devices as appropriate  - Consider OT/PT consult to assist with strengthening/mobility  - Encourage toileting schedule  Outcome: Progressing     Problem: DISCHARGE PLANNING  Goal: Discharge to home or other facility with appropriate resources  Description: INTERVENTIONS:  - Identify barriers to discharge w/pt and caregiver  - Include patient/family/discharge partner in discharge planning  - Arrange for needed discharge resources and transportation as appropriate  - Identify discharge learning needs (meds, wound care, etc)  - Arrange for interpreters to assist at discharge as needed  - Consider post-discharge preferences of patient/family/discharge partner  - Complete POLST form as appropriate  - Assess patient's ability to be responsible for managing their own health  - Refer to Case Management Department for coordinating discharge planning if the patient needs post-hospital services based on physician/LIP order or complex needs related to functional status, cognitive ability or social support system  Outcome: Progressing     Problem: HEMATOLOGIC - ADULT  Goal: Maintains hematologic stability  Description: INTERVENTIONS  - Assess for signs and symptoms of bleeding or hemorrhage  - Monitor labs and vital signs for trends  - Administer supportive blood products/factors, fluids and medications as ordered and appropriate  - Administer supportive blood products/factors as ordered and appropriate  Outcome: Progressing  Goal: Free from bleeding injury  Description: (Example usage: patient with low platelets)  INTERVENTIONS:  - Avoid intramuscular injections, enemas and rectal medication administration  - Ensure safe mobilization of patient  - Hold pressure on venipuncture sites to achieve adequate hemostasis  - Assess for signs and symptoms of internal bleeding  - Monitor lab trends  - Patient is to report abnormal signs of bleeding to staff  - Avoid use of toothpicks and dental floss  - Use electric shaver for shaving  - Use soft bristle tooth brush  - Limit straining and forceful nose blowing  Outcome: Progressing

## 2023-07-21 NOTE — DISCHARGE INSTRUCTIONS
Please fu dr Sarah Hays asap to set up outpt venofer infusions for yuridia either through his office or heme  Please check blood cbc,bmp when sees dr Dinah Beasley in am on 7/24/23 and watch each bm for bleed  Avoid oral iron as it may turn stools black and confuse picture          Medication List        START taking these medications      acetaminophen 500 MG Tabs  Commonly known as: Tylenol Extra Strength     Omeprazole 20 MG Tbec  Take 20 mg by mouth in the morning and 20 mg before bedtime. CHANGE how you take these medications      bumetanide 1 MG Tabs  Commonly known as: Bumex  Take 1 tablet (1 mg total) by mouth every other day. To take 0.5 mg by mouth on days not taking 1 mg by mouth  What changed: Another medication with the same name was removed. Continue taking this medication, and follow the directions you see here. DULoxetine 30 MG Cpep  Commonly known as: Cymbalta  Take one capsule with duloxetine 60mg for a total dose of 90mg daily  What changed: Another medication with the same name was removed. Continue taking this medication, and follow the directions you see here. CONTINUE taking these medications      diphenhydrAMINE HCl 50 MG Tabs  Commonly known as: BENADRYL  Take one tablet (50mg total) by mouth one hour prior to procedure. Mesalamine 4 g Enem  Use Monday, Wednesday, Friday night. methylphenidate 10 MG Tabs  Commonly known as: Ritalin  Take 0.5 tablets (5 mg total) by mouth daily. metoprolol succinate ER 25 MG Tb24  Commonly known as: Toprol XL  Take 1 tablet (25 mg total) by mouth daily. Take one tablet (25mg total) by mouth daily     Myrbetriq 50 MG Tb24  Generic drug: Mirabegron ER     potassium chloride 20 MEQ Tbcr  Commonly known as: K-Dur     rosuvastatin 20 MG Tabs  Commonly known as: Crestor  Take 1 tablet (20 mg total) by mouth daily.      tamsulosin 0.4 MG Caps  Commonly known as: Flomax     tolterodine 2 MG Tabs  Commonly known as: Detrol STOP taking these medications      Eliquis 5 MG Tabs  Generic drug: apixaban     predniSONE 50 MG Tabs  Commonly known as: Deltasone               Where to Get Your Medications        These medications were sent to [facility] [address], [place] [address] AT [address] [facility], [phone], [address], [place] [facility] [address]      Hours: 24-hours Phone: [phone]   Omeprazole 20 MG [facility]

## 2023-07-21 NOTE — PLAN OF CARE
Alert and orientated x4. Vitals stable. Capsule endoscopy completed at 2200, removed and placed at bedside. Lower extremities +3 edema, patient up in chair beginning of shift. Bed low, locked, bed alarm in place. Call light in reach. Problem: Patient Centered Care  Goal: Patient preferences are identified and integrated in the patient's plan of care  Description: Interventions:  - What would you like us to know as we care for you?  I live at home with my wife    - Provide timely, complete, and accurate information to patient/family  - Incorporate patient and family knowledge, values, beliefs, and cultural backgrounds into the planning and delivery of care  - Encourage patient/family to participate in care and decision-making at the level they choose  - Honor patient and family perspectives and choices  Outcome: Progressing     Problem: Patient/Family Goals  Goal: Patient/Family Long Term Goal  Description: Patient's Long Term Goal: go home    Interventions:  - follow MD orders  -Hydration  -diagnostic testing  - See additional Care Plan goals for specific interventions  Outcome: Progressing  Goal: Patient/Family Short Term Goal  Description: Patient's Short Term Goal:      Interventions:   -    - See additional Care Plan goals for specific interventions  Outcome: Progressing     Problem: PAIN - ADULT  Goal: Verbalizes/displays adequate comfort level or patient's stated pain goal  Description: INTERVENTIONS:  - Encourage pt to monitor pain and request assistance  - Assess pain using appropriate pain scale  - Administer analgesics based on type and severity of pain and evaluate response  - Implement non-pharmacological measures as appropriate and evaluate response  - Consider cultural and social influences on pain and pain management  - Manage/alleviate anxiety  - Utilize distraction and/or relaxation techniques  - Monitor for opioid side effects  - Notify MD/LIP if interventions unsuccessful or patient reports new pain  - Anticipate increased pain with activity and pre-medicate as appropriate  Outcome: Progressing     Problem: SAFETY ADULT - FALL  Goal: Free from fall injury  Description: INTERVENTIONS:  - Assess pt frequently for physical needs  - Identify cognitive and physical deficits and behaviors that affect risk of falls.   - [place] fall precautions as indicated by assessment.  - Educate pt/family on patient safety including physical limitations  - Instruct pt to call for assistance with activity based on assessment  - Modify environment to reduce risk of injury  - Provide assistive devices as appropriate  - Consider OT/PT consult to assist with strengthening/mobility  - Encourage toileting schedule  Outcome: Progressing     Problem: DISCHARGE PLANNING  Goal: Discharge to home or other facility with appropriate resources  Description: INTERVENTIONS:  - Identify barriers to discharge w/pt and caregiver  - Include patient/family/discharge partner in discharge planning  - Arrange for needed discharge resources and transportation as appropriate  - Identify discharge learning needs (meds, wound care, etc)  - Arrange for interpreters to assist at discharge as needed  - Consider post-discharge preferences of patient/family/discharge partner  - Complete POLST form as appropriate  - Assess patient's ability to be responsible for managing their own health  - Refer to Case Management Department for coordinating discharge planning if the patient needs post-hospital services based on physician/LIP order or complex needs related to functional status, cognitive ability or social support system  Outcome: Progressing     Problem: HEMATOLOGIC - ADULT  Goal: Maintains hematologic stability  Description: INTERVENTIONS  - Assess for signs and symptoms of bleeding or hemorrhage  - Monitor labs and vital signs for trends  - Administer supportive blood products/factors, fluids and medications as ordered and appropriate  - Administer supportive blood products/factors as ordered and appropriate  Outcome: Progressing  Goal: Free from bleeding injury  Description: (Example usage: patient with low platelets)  INTERVENTIONS:  - Avoid intramuscular injections, enemas and rectal medication administration  - Ensure safe mobilization of patient  - Hold pressure on venipuncture sites to achieve adequate hemostasis  - Assess for signs and symptoms of internal bleeding  - Monitor lab trends  - Patient is to report abnormal signs of bleeding to staff  - Avoid use of toothpicks and dental floss  - Use electric shaver for shaving  - Use soft bristle tooth brush  - Limit straining and forceful nose blowing  Outcome: Progressing

## 2023-07-21 NOTE — PROCEDURES
Capsule Endoscopy Report     Primary care physician: Beryl Leary MD  Date of procedure: 7/21/23  Indication: GI bleeding of obscure origin       After the risks and benefits were discussed with the patient and all questions were answered, the patient signed informed consent for the procedure. The patient ingested the Given M2A capsule endoscope without incident and completed the study according to standard protocol. Gastric transit time: NA, deployed at time of EGD  Small bowel transit time:  3 hrs and 12 minutes  Bowel prep/study quality: Good    Findings: The capsule was seen being deployed in the duodenum. There was an area of likely scope trauma in the duodenal sweep, but no bleeding. The small bowel examination was essentially normal. No blood or bleeding source identified. The capsule reaches the cecum at 3 hrs and 16 minutes. No blood was noted in the cecum as well. Impression:   Essentially normal small bowel study. No blood or bleeding source identified. Recommendation:   1. No obvious small bowel source of bleeding. 2.  Recent anemia likely related to large hiatal hernia in the setting of anticoagulation or the antral red spots noted on EGD. These were cauterized. 3.   PPI daily recommended. 4.   Iron therapy recommended. OK to discharge home from GI perspective. Can restart anticoagulation 2 days.       [facility] [facility]

## 2023-07-24 NOTE — DISCHARGE SUMMARY
[facility]    PATIENT'S NAME: Fernanda Rosales   ATTENDING PHYSICIAN: Mica Hwang MD   PATIENT ACCOUNT#:   [id number]    LOCATION:  [address] [place] [address] Road #:   [id number]       YOB: 1940  ADMISSION DATE:       07/19/2023      DISCHARGE DATE:  07/21/2023    DISCHARGE SUMMARY    About 45 minutes were spent preparing this discharge. DISCHARGE DIAGNOSIS:  Upper gastrointestinal blood loss anemia. HISTORY AND HOSPITAL COURSE:  This is a very pleasant 82-year-old white male who presents with a history of melena, appeared to be related to upper GI bleeding with acute upper GI blood loss anemia. He was admitted to the hospital and seen by Cardiology. He was on Eliquis for atrial fibrillation. The Eliquis was held short term, and he saw Dr. Dave Weber who performed an upper GI endoscopy then placed a pill to evaluate. The patient's pill endoscopy showed no obvious source of small bowel bleeding either. I recommend iron therapy and perhaps, I believe that the bleeding was related to red spots noted on the EGD and a large hiatal hernia. He was deemed stable to be discharged by GI and resume Eliquis in a few days. Follow up with Cardiology as needed. PHYSICAL EXAMINATION:  VITAL SIGNS:  On discharge, temperature is 97.7, pulse 74, respiratory rate 18, blood pressure is 147/85, 94%. LUNGS:  Occasional rhonchi. HEART:  Normal S1, S2. No S3.  ABDOMEN:  Soft and nontender. EXTREMITIES:  Without calf tenderness. NEUROLOGIC:  He is alert, oriented, friendly, and cooperative. LABORATORY STUDIES:  Please see chart. ASSESSMENT AND PLAN:  1. Upper gastrointestinal blood loss anemia, perhaps from hiatal hernia or other source, could be iron deficiency. We will continue iron infusion as an outpatient and will be set up by Dr. Lazara Maria or his hematologist, and continue Protonix. 2.   History of atrial fibrillation, anticoagulated with Eliquis. Restart in a few days.   3. Obesity with body mass index of 38.1. He may need ESTHER workup. 4.   Acute posthemorrhagic iron deficiency anemia. Patient is doing well. Continue iron infusions IV. 5.   Acute worsening of chronic kidney disease stage 3. Followup blood test as an outpatient. CONDITION ON DISCHARGE:  Stable. CODE STATUS:  Not discussed during this hospitalization. ACTIVITY:  No heavy exercise, otherwise as tolerated. DIET:  Cardiac. FOLLOWUP:   With Dr. Rodrigo Parks in 2 weeks, Dr. Delroy Dan in a few days. Please call for followup advice. See Dr. Coffey Reason as needed in 2 weeks. DISCHARGE MEDICATIONS:  1. Bumex 1 mg every other day. 2.   Benadryl 50 mg prior to any procedure. 3.   Cymbalta 90 mg a day. 4.   Eliquis 2.5 mg twice a day to resume several days after discharge. Actually, I wrote it down for him to resume the Eliquis on 07/24/2023.  5.   Continue mesalamine 4 g rectal enema every Monday, Wednesday, and Friday. 6.   Methylphenidate 5 mg daily. 7.   Metoprolol 25 mg daily. 8.   Myrbetriq 50 mg daily. 9.   Potassium chloride 20 mEq daily. 10.   Rosuvastatin/Crestor 20 mg daily. 11.   Tamsulosin 0.4 mg daily. 12.   Tolterodine 4 mg daily. 13.   Tylenol 500 mg every 6 hours as needed. Watch total daily Tylenol limit of 3 g.  14.   Omeprazole 20 mg twice a day. RISK OF READMISSION:  Moderate. TCM followup recommended. Dictated By Johanna Hwang MD  d: 07/22/2023 18:46:36  t: 07/22/2023 21:31:16  Job [id number]  [facility]/

## 2023-07-25 ENCOUNTER — ORDER TRANSCRIPTION (OUTPATIENT)
Dept: PHYSICAL THERAPY | Facility: HOSPITAL | Age: 83
End: 2023-07-25

## 2023-07-25 DIAGNOSIS — R15.9 FECAL INCONTINENCE: Primary | ICD-10-CM

## 2023-07-25 PROBLEM — D50.0 BLOOD LOSS ANEMIA: Status: ACTIVE | Noted: 2023-07-25

## 2023-08-15 ENCOUNTER — OFFICE VISIT (OUTPATIENT)
Dept: HEMATOLOGY/ONCOLOGY | Facility: HOSPITAL | Age: 83
End: 2023-08-15
Attending: INTERNAL MEDICINE
Payer: MEDICARE

## 2023-08-15 VITALS
DIASTOLIC BLOOD PRESSURE: 55 MMHG | OXYGEN SATURATION: 96 % | RESPIRATION RATE: 18 BRPM | TEMPERATURE: 98 F | HEART RATE: 64 BPM | SYSTOLIC BLOOD PRESSURE: 150 MMHG

## 2023-08-15 DIAGNOSIS — D50.0 BLOOD LOSS ANEMIA: Primary | ICD-10-CM

## 2023-08-15 PROCEDURE — 96374 THER/PROPH/DIAG INJ IV PUSH: CPT

## 2023-08-15 NOTE — PROGRESS NOTES
Patient arrives ambulatory with his wife for 1/4 Venofer 200mg infusion. Patient tolerated past 400mg Venofer infusion while inpatient. PIV placed with good blood return noted. Venofer 200mg given over 2 minutes via IVP with free flowing normal saline with blood return noted throughout. Patient tolerated infusion well with VSS post 30 minute observation. No s/s of an adverse reaction noted. PIV removed and gauze/tape applied to site. Patient discharged form infusion center in stable condition aware of future appointment's scheduled.

## 2023-08-22 ENCOUNTER — OFFICE VISIT (OUTPATIENT)
Dept: HEMATOLOGY/ONCOLOGY | Facility: HOSPITAL | Age: 83
End: 2023-08-22
Attending: INTERNAL MEDICINE
Payer: MEDICARE

## 2023-08-22 VITALS
SYSTOLIC BLOOD PRESSURE: 143 MMHG | HEART RATE: 55 BPM | OXYGEN SATURATION: 95 % | DIASTOLIC BLOOD PRESSURE: 59 MMHG | TEMPERATURE: 98 F

## 2023-08-22 DIAGNOSIS — D50.0 BLOOD LOSS ANEMIA: Primary | ICD-10-CM

## 2023-08-22 PROCEDURE — 96374 THER/PROPH/DIAG INJ IV PUSH: CPT

## 2023-08-22 NOTE — PROGRESS NOTES
Patient arrives ambulating independently for 2 of 4 VENOFER 200mg. Venofer 200mg infused over 2 minutes with 30 minute observation completed. Patient tolerated infusion well, no s/s of adverse reaction. Vital signs WNL. PIV removed, 2x2 gauze and coban applied. Discharged home, stable and aware of upcoming appointments.

## 2023-08-29 ENCOUNTER — OFFICE VISIT (OUTPATIENT)
Dept: HEMATOLOGY/ONCOLOGY | Facility: HOSPITAL | Age: 83
End: 2023-08-29
Attending: INTERNAL MEDICINE
Payer: MEDICARE

## 2023-08-29 VITALS
SYSTOLIC BLOOD PRESSURE: 138 MMHG | RESPIRATION RATE: 18 BRPM | DIASTOLIC BLOOD PRESSURE: 62 MMHG | TEMPERATURE: 98 F | HEART RATE: 62 BPM | OXYGEN SATURATION: 96 %

## 2023-08-29 DIAGNOSIS — D50.0 BLOOD LOSS ANEMIA: Primary | ICD-10-CM

## 2023-08-29 PROCEDURE — 96374 THER/PROPH/DIAG INJ IV PUSH: CPT

## 2023-09-05 ENCOUNTER — APPOINTMENT (OUTPATIENT)
Dept: HEMATOLOGY/ONCOLOGY | Facility: HOSPITAL | Age: 83
End: 2023-09-05
Attending: INTERNAL MEDICINE
Payer: MEDICARE

## 2023-09-07 ENCOUNTER — APPOINTMENT (OUTPATIENT)
Dept: HEMATOLOGY/ONCOLOGY | Facility: HOSPITAL | Age: 83
End: 2023-09-07
Attending: INTERNAL MEDICINE
Payer: MEDICARE

## 2023-09-09 ENCOUNTER — HOSPITAL ENCOUNTER (OUTPATIENT)
Dept: ULTRASOUND IMAGING | Facility: HOSPITAL | Age: 83
Discharge: HOME | End: 2023-09-09
Attending: INTERNAL MEDICINE
Payer: MEDICARE

## 2023-09-09 DIAGNOSIS — R60.9 EDEMA: ICD-10-CM

## 2023-09-09 PROCEDURE — 93971 EXTREMITY STUDY: CPT | Performed by: INTERNAL MEDICINE

## 2023-12-25 ENCOUNTER — APPOINTMENT (OUTPATIENT)
Dept: GENERAL RADIOLOGY | Facility: HOSPITAL | Age: 83
End: 2023-12-25
Attending: EMERGENCY MEDICINE
Payer: MEDICARE

## 2023-12-25 ENCOUNTER — HOSPITAL ENCOUNTER (EMERGENCY)
Facility: HOSPITAL | Age: 83
Discharge: HOME | End: 2023-12-25
Attending: EMERGENCY MEDICINE
Payer: MEDICARE

## 2023-12-25 VITALS
HEART RATE: 78 BPM | WEIGHT: 278 LBS | OXYGEN SATURATION: 93 % | TEMPERATURE: 99 F | HEIGHT: 68 IN | RESPIRATION RATE: 18 BRPM | SYSTOLIC BLOOD PRESSURE: 177 MMHG | DIASTOLIC BLOOD PRESSURE: 108 MMHG | BODY MASS INDEX: 42.13 KG/M2

## 2023-12-25 DIAGNOSIS — S61.402A AVULSION OF SKIN OF LEFT HAND, INITIAL ENCOUNTER: ICD-10-CM

## 2023-12-25 DIAGNOSIS — S61.452A DOG BITE, HAND, LEFT, INITIAL ENCOUNTER: Primary | ICD-10-CM

## 2023-12-25 DIAGNOSIS — S61.412A LACERATION OF LEFT HAND WITHOUT FOREIGN BODY, INITIAL ENCOUNTER: ICD-10-CM
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
## 2023-12-25 PROCEDURE — 73130 X-RAY EXAM OF HAND: CPT | Performed by: EMERGENCY MEDICINE

## 2023-12-25 PROCEDURE — 12002 RPR S/N/AX/GEN/TRNK2.6-7.5CM: CPT

## 2023-12-25 PROCEDURE — 90471 IMMUNIZATION ADMIN: CPT

## 2023-12-25 PROCEDURE — 99284 EMERGENCY DEPT VISIT MOD MDM: CPT

## 2023-12-25 RX ORDER — AMOXICILLIN AND CLAVULANATE POTASSIUM 875; 125 MG/1; MG/1
1 TABLET, FILM COATED ORAL 2 TIMES DAILY
Qty: 14 TABLET | Refills: 0 | Status: SHIPPED | OUTPATIENT
Start: 2023-12-25 | End: 2023-12-25 | Stop reason: CLARIF

## 2023-12-25 RX ORDER — HYDROCODONE BITARTRATE AND ACETAMINOPHEN 5; 325 MG/1; MG/1
1 TABLET ORAL ONCE
Status: COMPLETED | OUTPATIENT
Start: 2023-12-25 | End: 2023-12-25

## 2023-12-25 RX ORDER — AMOXICILLIN AND CLAVULANATE POTASSIUM 875; 125 MG/1; MG/1
1 TABLET, FILM COATED ORAL 2 TIMES DAILY
Qty: 14 TABLET | Refills: 0 | Status: SHIPPED | OUTPATIENT
Start: 2023-12-25 | End: 2024-01-01

## 2023-12-25 NOTE — DISCHARGE INSTRUCTIONS
Have wound check in 2 days. Have sutures removed in 7 to 10 days. Apply wet-to-dry dressings to the skin avulsion daily as instructed.

## 2023-12-25 NOTE — ED QUICK NOTES
Wet to dry dressing applied to avulsion and bacitracin and bandaid placed on sutures. Fingers ramone taped. Pt and family educated about the care for the wounds. Pt and family verbally understood.

## 2024-05-08 ENCOUNTER — APPOINTMENT (OUTPATIENT)
Dept: URBAN - METROPOLITAN AREA CLINIC 244 | Age: 84
Setting detail: DERMATOLOGY
End: 2024-05-08

## 2024-05-08 DIAGNOSIS — L30.9 DERMATITIS, UNSPECIFIED: ICD-10-CM

## 2024-05-08 DIAGNOSIS — D69.2 OTHER NONTHROMBOCYTOPENIC PURPURA: ICD-10-CM

## 2024-05-08 PROCEDURE — 99213 OFFICE O/P EST LOW 20 MIN: CPT

## 2024-05-08 PROCEDURE — OTHER ADDITIONAL NOTES: OTHER

## 2024-05-08 PROCEDURE — OTHER PRESCRIPTION: OTHER

## 2024-05-08 PROCEDURE — OTHER COUNSELING: OTHER

## 2024-05-08 RX ORDER — TRIAMCINOLONE ACETONIDE 1 MG/G
OINTMENT TOPICAL
Qty: 80 | Refills: 0 | Status: ERX | COMMUNITY
Start: 2024-05-08

## 2024-05-08 ASSESSMENT — LOCATION DETAILED DESCRIPTION DERM
LOCATION DETAILED: RIGHT DISTAL DORSAL FOREARM
LOCATION DETAILED: LEFT RADIAL DORSAL HAND
LOCATION DETAILED: RIGHT ULNAR DORSAL HAND
LOCATION DETAILED: LEFT DISTAL DORSAL FOREARM

## 2024-05-08 ASSESSMENT — LOCATION SIMPLE DESCRIPTION DERM
LOCATION SIMPLE: RIGHT HAND
LOCATION SIMPLE: RIGHT FOREARM
LOCATION SIMPLE: LEFT FOREARM
LOCATION SIMPLE: LEFT HAND

## 2024-05-08 ASSESSMENT — LOCATION ZONE DERM
LOCATION ZONE: ARM
LOCATION ZONE: HAND

## 2024-05-08 NOTE — HPI: SKIN LESION (SKIN TAGS)
How Severe Are They?: mild
Is This A New Presentation, Or A Follow-Up?: Skin Lesion
Additional History: Trialed OTC hydrocortisone

## 2024-05-08 NOTE — PROCEDURE: ADDITIONAL NOTES
Additional Notes: Moisturize with Vaseline in between Triamcinolone
Render Risk Assessment In Note?: no
Detail Level: Simple

## 2024-05-21 ENCOUNTER — LAB ENCOUNTER (OUTPATIENT)
Dept: LAB | Age: 84
End: 2024-05-21
Attending: INTERNAL MEDICINE

## 2024-05-21 DIAGNOSIS — Z11.1 SCREENING FOR TUBERCULOSIS: Primary | ICD-10-CM

## 2024-05-21 PROCEDURE — 86480 TB TEST CELL IMMUN MEASURE: CPT

## 2024-05-21 PROCEDURE — 36415 COLL VENOUS BLD VENIPUNCTURE: CPT

## 2024-05-23 LAB
M TB IFN-G CD4+ T-CELLS BLD-ACNC: 0.04 IU/ML
M TB TUBERC IFN-G BLD QL: NEGATIVE
M TB TUBERC IGNF/MITOGEN IGNF CONTROL: 9.51 IU/ML
QFT TB1 AG MINUS NIL: 0 IU/ML
QFT TB2 AG MINUS NIL: 0 IU/ML

## 2024-05-30 ENCOUNTER — APPOINTMENT (OUTPATIENT)
Dept: URBAN - METROPOLITAN AREA CLINIC 244 | Age: 84
Setting detail: DERMATOLOGY
End: 2024-05-31

## 2024-05-30 DIAGNOSIS — L57.0 ACTINIC KERATOSIS: ICD-10-CM

## 2024-05-30 DIAGNOSIS — L30.9 DERMATITIS, UNSPECIFIED: ICD-10-CM

## 2024-05-30 DIAGNOSIS — D69.2 OTHER NONTHROMBOCYTOPENIC PURPURA: ICD-10-CM

## 2024-05-30 DIAGNOSIS — D22 MELANOCYTIC NEVI: ICD-10-CM

## 2024-05-30 PROBLEM — D48.5 NEOPLASM OF UNCERTAIN BEHAVIOR OF SKIN: Status: ACTIVE | Noted: 2024-05-30

## 2024-05-30 PROBLEM — D22.72 MELANOCYTIC NEVI OF LEFT LOWER LIMB, INCLUDING HIP: Status: ACTIVE | Noted: 2024-05-30

## 2024-05-30 PROCEDURE — OTHER BIOPSY BY SHAVE METHOD: OTHER

## 2024-05-30 PROCEDURE — OTHER PRESCRIPTION MEDICATION MANAGEMENT: OTHER

## 2024-05-30 PROCEDURE — OTHER COUNSELING: OTHER

## 2024-05-30 PROCEDURE — 11102 TANGNTL BX SKIN SINGLE LES: CPT

## 2024-05-30 PROCEDURE — OTHER LIQUID NITROGEN: OTHER

## 2024-05-30 PROCEDURE — OTHER ADDITIONAL NOTES: OTHER

## 2024-05-30 PROCEDURE — 17000 DESTRUCT PREMALG LESION: CPT | Mod: 59

## 2024-05-30 PROCEDURE — 11103 TANGNTL BX SKIN EA SEP/ADDL: CPT

## 2024-05-30 PROCEDURE — 99213 OFFICE O/P EST LOW 20 MIN: CPT | Mod: 25

## 2024-05-30 PROCEDURE — 17003 DESTRUCT PREMALG LES 2-14: CPT

## 2024-05-30 ASSESSMENT — LOCATION DETAILED DESCRIPTION DERM
LOCATION DETAILED: LEFT SUPERIOR PARIETAL SCALP
LOCATION DETAILED: LEFT RADIAL DORSAL HAND
LOCATION DETAILED: RIGHT ULNAR DORSAL HAND
LOCATION DETAILED: RIGHT DISTAL DORSAL FOREARM
LOCATION DETAILED: LEFT ANTERIOR DISTAL THIGH
LOCATION DETAILED: LEFT DISTAL DORSAL FOREARM
LOCATION DETAILED: RIGHT CENTRAL PARIETAL SCALP
LOCATION DETAILED: LEFT INFERIOR VERMILION LIP
LOCATION DETAILED: RIGHT SUPERIOR PARIETAL SCALP

## 2024-05-30 ASSESSMENT — LOCATION SIMPLE DESCRIPTION DERM
LOCATION SIMPLE: LEFT LIP
LOCATION SIMPLE: LEFT HAND
LOCATION SIMPLE: RIGHT FOREARM
LOCATION SIMPLE: RIGHT HAND
LOCATION SIMPLE: LEFT FOREARM
LOCATION SIMPLE: SCALP
LOCATION SIMPLE: LEFT THIGH

## 2024-05-30 ASSESSMENT — LOCATION ZONE DERM
LOCATION ZONE: HAND
LOCATION ZONE: SCALP
LOCATION ZONE: LEG
LOCATION ZONE: LIP
LOCATION ZONE: ARM

## 2024-05-30 NOTE — PROCEDURE: ADDITIONAL NOTES
Additional Notes: Moisturize with OTC Cerave
Render Risk Assessment In Note?: no
Detail Level: Simple

## 2024-05-30 NOTE — PROCEDURE: COUNSELING
Detail Level: Simple
Detail Level: Zone
Nicotinamide Supplementation Recommendations: All supplements should be USP (https://www.usp.org/verification-services/verified-jono).
Sunscreen Recommendations: Brands include neutrogena, La-Roche, and Elta-MD. Rec mineral sunscreen use (zinc/titanium dioxide) over chemical sunscreens due to safety.
Detail Level: Detailed

## 2024-05-30 NOTE — PROCEDURE: LIQUID NITROGEN
Detail Level: Zone
Render Post-Care Instructions In Note?: yes
Total Number Of Aks Treated: 12
Number Of Freeze-Thaw Cycles: 1 freeze-thaw cycle
Post-Care Instructions: I reviewed with the patient in detail post-care instructions. Patient is to wear sunprotection, and avoid picking at any of the treated lesions. Pt may apply Vaseline to crusted or scabbing areas.
Consent: The patient's consent was obtained after discussing risks/benefits, and alternatives to the procedure including but not limited to risks of pain, crusting/scabbing, blistering, scarring, darker or lighter pigmentary change, recurrence, incomplete removal and infection. Patient had the opportunity to ask questions and understand the purpose of the treatment, the benefits, the risks to the treatment and what other treatments could be utilized. Treatment was only provided after there was certainty that the patient felt knowledgeable/comfortable enough about the treatment to proceed with it. Patient verbalized understanding of the above. \\n\\nOther discussion points regarding treatment:\\n- Patient aware that I cannot guarantee cosmetic outcome of any liquid nitrogen application and that this treatment is being done for medical purposes. \\n- Patient is aware that treatment today will likely produce erythema and some swelling (and sometimes blistering) that may be cosmetically unappealing to the patient if they have any speaking engagements or events in the coming weeks. These are especially pronounced in the few days after initial treatment.\\n- Patient is aware that this treatment does not mean that patient will not develop further actinic keratoses in the future and appropriate surveillance should be done moving forward (at least once a year but more often if further lesions noted). The patient is aware that this treatment does not guarantee the treatment of any lesion treated and that reassessment is required if a lesion does NOT resolve (typically within 1 months time resolution would be expected as discussed with pt). I do recommend follow up in office so I can properly assess if it has resolved, this was discussed and offered. \\n- If any singular spot on pt's body were to grow/bleed/worsen (whether it is an actinic keratosis that we treated today or another lesion present elsewhere), pt knows it is imperative that we assess in clinic in a timely fashion to evaluate and develop a treatment plan.\\n\\nThis is not a cosmetic service and is expected to be covered under insurance plans but co-insurance or copays may apply. Pt is aware of this and that I cannot predict co-pays/co-insurance costs.

## 2024-05-30 NOTE — PROCEDURE: BIOPSY BY SHAVE METHOD
Dressing: bandage
Bill For Surgical Tray: no
Biopsy Method: double edge Personna blade
Anesthesia Volume In Cc: 0.5
Electrodesiccation And Curettage Text: The wound bed was treated with electrodesiccation and curettage after the biopsy was performed.
Anesthesia Type: 0.5% lidocaine with 1:200,000 epinephrine and a 1:10 solution of 8.4% sodium bicarbonate
Render Post-Care Instructions In Note?: yes
Curettage Text: The wound bed was treated with curettage after the biopsy was performed.
Silver Nitrate Text: The wound bed was treated with silver nitrate after the biopsy was performed.
X Size Of Lesion In Cm: 0
Information: Selecting Yes will display possible errors in your note based on the variables you have selected. This validation is only offered as a suggestion for you. PLEASE NOTE THAT THE VALIDATION TEXT WILL BE REMOVED WHEN YOU FINALIZE YOUR NOTE. IF YOU WANT TO FAX A PRELIMINARY NOTE YOU WILL NEED TO TOGGLE THIS TO 'NO' IF YOU DO NOT WANT IT IN YOUR FAXED NOTE.
Consent: Written consent was obtained and risks were reviewed including but not limited to scarring, infection, bleeding, scabbing, incomplete removal, nerve damage and allergy to anesthesia. The scar produced with a shave biopsy is often suboptimal and appears atrophic and white. Patient understands this and prefers shave biopsy instead of a punch biopsy.\\n\\nPatient aware that I cannot guarantee cosmetic outcome of biopsy and biopsy is done for medical purposes (not cosmetic).\\n\\nPatient aware that sometimes biopsy results can be inconclusive and that another biopsy or procedure may be required.
Lab: [id number]
Billing Type: Third-Party Bill
Biopsy Type: H and E
Cryotherapy Text: The wound bed was treated with cryotherapy after the biopsy was performed.
Wound Care: Petrolatum
Notification Instructions: Patient will be notified of biopsy results. However, patient instructed to call the office if not contacted within 2 weeks.
Depth Of Biopsy: dermis
Electrodesiccation Text: The wound bed was treated with electrodesiccation after the biopsy was performed.
Detail Level: Detailed
Type Of Destruction Used: Curettage
Post-Care Instructions: Reviewed with the patient in detail post-care instructions. Patient is to keep the area dry for 48 hours, and not to engage in any swimming until the area is healed. If patient does have water exposure, recommend waterproof (hydrocolloid dressing) application. Should the patient develop any fevers, chills, bleeding, severe pain, then the patient will contact the office immediately.\\n\\n** The patient was explicitly instructed to NOT apply any other topicals to the area other than plain vaseline with a clean Q-tip **. No Neosporin or topical Ab unless prescribed/recommended by the Dr is to be used.\\n\\nThe patient is aware to have the area re-evaluated if it is not healed
Hemostasis: Drysol
Consent: Written consent was obtained and risks were reviewed including but not limited to scarring, infection, bleeding, scabbing, incomplete removal, nerve damage and allergy to anesthesia.
Anesthesia Type: 0.5% lidocaine with 1:100,000 epinephrine and a 1:10 solution of 8.4% sodium bicarbonate
Post-Care Instructions: I reviewed with the patient in detail post-care instructions. Patient is to keep the biopsy site dry overnight, and then apply bacitracin twice daily until healed. Patient may apply hydrogen peroxide soaks to remove any crusting.

## 2024-06-17 ENCOUNTER — APPOINTMENT (OUTPATIENT)
Dept: URBAN - METROPOLITAN AREA CLINIC 244 | Age: 84
Setting detail: DERMATOLOGY
End: 2024-06-17

## 2024-06-17 DIAGNOSIS — L81.4 OTHER MELANIN HYPERPIGMENTATION: ICD-10-CM

## 2024-06-17 PROBLEM — C44.619 BASAL CELL CARCINOMA OF SKIN OF LEFT UPPER LIMB, INCLUDING SHOULDER: Status: ACTIVE | Noted: 2024-06-17

## 2024-06-17 PROCEDURE — 99212 OFFICE O/P EST SF 10 MIN: CPT | Mod: 25

## 2024-06-17 PROCEDURE — 11603 EXC TR-EXT MAL+MARG 2.1-3 CM: CPT

## 2024-06-17 PROCEDURE — OTHER COUNSELING: OTHER

## 2024-06-17 PROCEDURE — OTHER EXCISION: OTHER

## 2024-06-17 PROCEDURE — 12032 INTMD RPR S/A/T/EXT 2.6-7.5: CPT

## 2024-06-17 PROCEDURE — OTHER DIAGNOSIS COMMENT: OTHER

## 2024-06-17 ASSESSMENT — LOCATION SIMPLE DESCRIPTION DERM: LOCATION SIMPLE: LEFT THIGH

## 2024-06-17 ASSESSMENT — LOCATION DETAILED DESCRIPTION DERM: LOCATION DETAILED: LEFT ANTERIOR DISTAL THIGH

## 2024-06-17 ASSESSMENT — LOCATION ZONE DERM: LOCATION ZONE: LEG

## 2024-06-17 NOTE — PROCEDURE: EXCISION
Modified Advancement Flap Text: The defect edges were debeveled with a #15 scalpel blade. Given the location of the defect, shape of the defect and the proximity to free margins a modified advancement flap was deemed most appropriate. Using a sterile surgical marker, an appropriate advancement flap was drawn incorporating the defect and placing the expected incisions within the relaxed skin tension lines where possible. The area thus outlined was incised deep to adipose tissue with a #15 scalpel blade. The skin margins were undermined to an appropriate distance in all directions utilizing iris scissors. Following this, the designed flap was advanced and carried over into the primary defect and sutured into place.
Helical Rim Advancement Flap Text: The defect edges were debeveled with a #15 blade scalpel.  Given the location of the defect and the proximity to free margins (helical rim) a double helical rim advancement flap was deemed most appropriate. Using a sterile surgical marker, the appropriate advancement flaps were drawn incorporating the defect and placing the expected incisions between the helical rim and antihelix where possible.  The area thus outlined was incised through and through with a #15 scalpel blade.  With a skin hook and iris scissors, the flaps were gently and sharply undermined and freed up. Folllowing this, the designed flaps were carried over into the primary defect and sutured into place.
Slit Excision Additional Text (Leave Blank If You Do Not Want): A linear line was drawn on the skin overlying the lesion. An incision was made slowly until the lesion was visualized.  Once visualized, the lesion was removed with blunt dissection.
Rhomboid Transposition Flap Text: The defect edges were debeveled with a #15 scalpel blade. Given the location of the defect and the proximity to free margins a rhomboid transposition flap was deemed most appropriate. Using a sterile surgical marker, an appropriate rhomboid flap was drawn incorporating the defect. The area thus outlined was incised deep to adipose tissue with a #15 scalpel blade. The skin margins were undermined to an appropriate distance in all directions utilizing iris scissors. Following this, the designed flap was carried over into the primary defect and sutured into place.
Use Complex Repair Preambles?: Yes
Suturegard Body: The suture ends were repeatedly re-tightened and re-clamped to achieve the desired tissue expansion.
Intermediate / Complex Repair - Final Wound Length In Cm: 4.7
Include Suturegard In Note?: No
Post-Care Instructions: I reviewed with the patient in detail post-care instructions. Patient is not to engage in any heavy lifting, exercise, or swimming for the next 14 days. Should the patient develop any fevers, chills, bleeding, severe pain patient will contact the office immediately.
Where Do You Want The Question To Include Opioid Counseling Located?: Case Summary Tab
Complex Repair And Skin Substitute Graft Text: The defect edges were debeveled with a #15 scalpel blade.  The primary defect was closed partially with a complex linear closure.  Given the location of the remaining defect, shape of the defect and the proximity to free margins a skin substitute graft was deemed most appropriate to repair the remaining defect.  The graft was trimmed to fit the size of the remaining defect.  The graft was then placed in the primary defect, oriented appropriately, and sutured into place.
Lazy S Complex Repair Preamble Text (Leave Blank If You Do Not Want): Extensive wide undermining was performed.
Melolabial Transposition Flap Text: The defect edges were debeveled with a #15 scalpel blade. Given the location of the defect and the proximity to free margins a melolabial flap was deemed most appropriate. Using a sterile surgical marker, an appropriate melolabial transposition flap was drawn incorporating the defect. The area thus outlined was incised deep to adipose tissue with a #15 scalpel blade. The skin margins were undermined to an appropriate distance in all directions utilizing iris scissors. Following this, the designed flap was carried over into the primary defect and sutured into place.
Repair Performed By Another Provider Text (Leave Blank If You Do Not Want): After the tissue was excised the defect was repaired by another provider.
Peng Advancement Flap Text: The defect edges were debeveled with a #15 scalpel blade. Given the location of the defect, shape of the defect and the proximity to free margins a Peng advancement flap was deemed most appropriate. Using a sterile surgical marker, an appropriate advancement flap was drawn incorporating the defect and placing the expected incisions within the relaxed skin tension lines where possible. The area thus outlined was incised deep to adipose tissue with a #15 scalpel blade. The skin margins were undermined to an appropriate distance in all directions utilizing iris scissors. Following this, the designed flap was advanced and carried over into the primary defect and sutured into place.
Complex Repair And A-T Advancement Flap Text: The defect edges were debeveled with a #15 scalpel blade.  The primary defect was closed partially with a complex linear closure.  Given the location of the remaining defect, shape of the defect and the proximity to free margins an A-T advancement flap was deemed most appropriate for complete closure of the defect.  Using a sterile surgical marker, an appropriate advancement flap was drawn incorporating the defect and placing the expected incisions within the relaxed skin tension lines where possible. The area thus outlined was incised deep to adipose tissue with a #15 scalpel blade. The skin margins were undermined to an appropriate distance in all directions utilizing iris scissors and carried over to close the primary defect.
Retention Suture Text: Retention sutures were placed to support the closure and prevent dehiscence.
Complex Repair And Modified Advancement Flap Text: The defect edges were debeveled with a #15 scalpel blade.  The primary defect was closed partially with a complex linear closure.  Given the location of the remaining defect, shape of the defect and the proximity to free margins a modified advancement flap was deemed most appropriate for complete closure of the defect.  Using a sterile surgical marker, an appropriate advancement flap was drawn incorporating the defect and placing the expected incisions within the relaxed skin tension lines where possible. The area thus outlined was incised deep to adipose tissue with a #15 scalpel blade. The skin margins were undermined to an appropriate distance in all directions utilizing iris scissors and carried over to close the primary defect.
Double Z Plasty Text: The lesion was extirpated to the level of the fat with a #15 scalpel blade. Given the location of the defect, shape of the defect and the proximity to free margins a double Z-plasty was deemed most appropriate for repair. Using a sterile surgical marker, the appropriate transposition arms of the double Z-plasty were drawn incorporating the defect and placing the expected incisions within the relaxed skin tension lines where possible. The area thus outlined was incised deep to adipose tissue with a #15 scalpel blade. The skin margins were undermined to an appropriate distance in all directions utilizing iris scissors. The opposing transposition arms were then transposed and carried over into place in opposite direction and anchored with interrupted buried subcutaneous sutures.
Length To Time In Minutes Device Was In Place: 10
Zygomaticofacial Flap Text: Given the location of the defect, shape of the defect and the proximity to free margins a zygomaticofacial flap was deemed most appropriate for repair. Using a sterile surgical marker, the appropriate flap was drawn incorporating the defect and placing the expected incisions within the relaxed skin tension lines where possible. The area thus outlined was incised deep to adipose tissue with a #15 scalpel blade with preservation of a vascular pedicle.  The skin margins were undermined to an appropriate distance in all directions utilizing iris scissors. The flap was then carried over into the defect and anchored with interrupted buried subcutaneous sutures.
Secondary Defect Length (In Cm): 0
Bilateral Rotation Flap Text: The defect edges were debeveled with a #15 scalpel blade. Given the location of the defect, shape of the defect and the proximity to free margins a bilateral rotation flap was deemed most appropriate. Using a sterile surgical marker, an appropriate rotation flap was drawn incorporating the defect and placing the expected incisions within the relaxed skin tension lines where possible. The area thus outlined was incised deep to adipose tissue with a #15 scalpel blade. The skin margins were undermined to an appropriate distance in all directions utilizing iris scissors. Following this, the designed flap was carried over into the primary defect and sutured into place.
Double M-Plasty Intermediate Repair Preamble Text (Leave Blank If You Do Not Want): Undermining was performed with blunt dissection.
Helical Rim Text: The closure involved the helical rim.
Island Pedicle Flap-Requiring Vessel Identification Text: The defect edges were debeveled with a #15 scalpel blade. Given the location of the defect, shape of the defect and the proximity to free margins an island pedicle advancement flap was deemed most appropriate. Using a sterile surgical marker, an appropriate advancement flap was drawn, based on the axial vessel mentioned above, incorporating the defect, outlining the appropriate donor tissue and placing the expected incisions within the relaxed skin tension lines where possible. The area thus outlined was incised deep to adipose tissue with a #15 scalpel blade. The skin margins were undermined to an appropriate distance in all directions around the primary defect and laterally outward around the island pedicle utilizing iris scissors.  There was minimal undermining beneath the pedicle flap. Following this, the designed flap was carried over into the primary defect and sutured into place.
Detail Level: Detailed
Melolabial Interpolation Flap Text: A decision was made to reconstruct the defect utilizing an interpolation axial flap and a staged reconstruction.  A telfa template was made of the defect.  This telfa template was then used to outline the melolabial interpolation flap.  The donor area for the pedicle flap was then injected with anesthesia.  The flap was excised through the skin and subcutaneous tissue down to the layer of the underlying musculature.  The pedicle flap was carefully excised within this deep plane to maintain its blood supply.  The edges of the donor site were undermined.   The donor site was closed in a primary fashion.  The pedicle was then rotated into position and sutured.  Once the tube was sutured into place, adequate blood supply was confirmed with blanching and refill.  The pedicle was then wrapped with xeroform gauze and dressed appropriately with a telfa and gauze bandage to ensure continued blood supply and protect the attached pedicle.
Paramedian Forehead Flap Text: A decision was made to reconstruct the defect utilizing an interpolation axial flap and a staged reconstruction.  A telfa template was made of the defect.  This telfa template was then used to outline the paramedian forehead pedicle flap.  The donor area for the pedicle flap was then injected with anesthesia.  The flap was excised through the skin and subcutaneous tissue down to the layer of the underlying musculature.  The pedicle flap was carefully excised within this deep plane to maintain its blood supply.  The edges of the donor site were undermined.   The donor site was closed in a primary fashion.  The pedicle was then rotated into position and sutured.  Once the tube was sutured into place, adequate blood supply was confirmed with blanching and refill.  The pedicle was then wrapped with xeroform gauze and dressed appropriately with a telfa and gauze bandage to ensure continued blood supply and protect the attached pedicle.
Hemostasis: Electrocautery
Saucerization Excision Additional Text (Leave Blank If You Do Not Want): The margin was drawn around the clinically apparent lesion.  Incisions were then made along these lines, in a tangential fashion, to the appropriate tissue plane and the lesion was extirpated.
Complex Repair And Ftsg Text: The defect edges were debeveled with a #15 scalpel blade.  The primary defect was closed partially with a complex linear closure.  Given the location of the defect, shape of the defect and the proximity to free margins a full thickness skin graft was deemed most appropriate to repair the remaining defect.  The graft was trimmed to fit the size of the remaining defect.  The graft was then placed in the primary defect, oriented appropriately, and sutured into place.
Abbe Flap (Upper To Lower Lip) Text: The defect of the lower lip was assessed and measured.  Given the location and size of the defect, an Abbe flap was deemed most appropriate. Using a sterile surgical marker, an appropriate Abbe flap was measured and drawn on the upper lip. Local anesthesia was then infiltrated.  A scalpel was then used to incise the upper lip through and through the skin, vermilion, muscle and mucosa, leaving the flap pedicled on the opposite side.  The flap was then rotated and transferred to the lower lip defect.  The flap was then sutured into place with a three layer technique, closing the orbicularis oris muscle layer with subcutaneous buried sutures, followed by a mucosal layer and an epidermal layer.
Bi-Rhombic Flap Text: The defect edges were debeveled with a #15 scalpel blade. Given the location of the defect and the proximity to free margins a bi-rhombic flap was deemed most appropriate. Using a sterile surgical marker, an appropriate rhombic flap was drawn incorporating the defect. The area thus outlined was incised deep to adipose tissue with a #15 scalpel blade. The skin margins were undermined to an appropriate distance in all directions utilizing iris scissors. Following this, the designed flap was carried over into the primary defect and sutured into place.
A-T Advancement Flap Text: The defect edges were debeveled with a #15 scalpel blade. Given the location of the defect, shape of the defect and the proximity to free margins an A-T advancement flap was deemed most appropriate. Using a sterile surgical marker, an appropriate advancement flap was drawn incorporating the defect and placing the expected incisions within the relaxed skin tension lines where possible. The area thus outlined was incised deep to adipose tissue with a #15 scalpel blade. The skin margins were undermined to an appropriate distance in all directions utilizing iris scissors. Following this, the designed flap was advanced and carried over into the primary defect and sutured into place.
Complex Repair And O-T Advancement Flap Text: The defect edges were debeveled with a #15 scalpel blade.  The primary defect was closed partially with a complex linear closure.  Given the location of the remaining defect, shape of the defect and the proximity to free margins an O-T advancement flap was deemed most appropriate for complete closure of the defect.  Using a sterile surgical marker, an appropriate advancement flap was drawn incorporating the defect and placing the expected incisions within the relaxed skin tension lines where possible. The area thus outlined was incised deep to adipose tissue with a #15 scalpel blade. The skin margins were undermined to an appropriate distance in all directions utilizing iris scissors and carried over to close the primary defect.
Width Of Defect Perpendicular To Closure In Cm (Required): 1
Complex Repair And Tissue Cultured Epidermal Autograft Text: The defect edges were debeveled with a #15 scalpel blade.  The primary defect was closed partially with a complex linear closure.  Given the location of the defect, shape of the defect and the proximity to free margins an tissue cultured epidermal autograft was deemed most appropriate to repair the remaining defect.  The graft was trimmed to fit the size of the remaining defect.  The graft was then placed in the primary defect, oriented appropriately, and sutured into place.
Epidermal Sutures: 4-0 Nylon
Excisional Biopsy Additional Text (Leave Blank If You Do Not Want): The margin was drawn around the clinically apparent lesion. An elliptical shape was then drawn on the skin incorporating the lesion and margins.  Incisions were then made along these lines to the appropriate tissue plane and the lesion was extirpated.
Complex Repair And Double M Plasty Text: The defect edges were debeveled with a #15 scalpel blade.  The primary defect was closed partially with a complex linear closure.  Given the location of the remaining defect, shape of the defect and the proximity to free margins a double M plasty was deemed most appropriate for complete closure of the defect.  Using a sterile surgical marker, an appropriate advancement flap was drawn incorporating the defect and placing the expected incisions within the relaxed skin tension lines where possible. The area thus outlined was incised deep to adipose tissue with a #15 scalpel blade. The skin margins were undermined to an appropriate distance in all directions utilizing iris scissors and carried over to close the primary defect.
Elliptical Excision Additional Text (Leave Blank If You Do Not Want): The margin was drawn around the clinically apparent lesion.  An elliptical shape was then drawn on the skin incorporating the lesion and margins.  Incisions were then made along these lines to the appropriate tissue plane and the lesion was extirpated.
Tissue Cultured Epidermal Autograft Text: The defect edges were debeveled with a #15 scalpel blade. Given the location of the defect, shape of the defect and the proximity to free margins a tissue cultured epidermal autograft was deemed most appropriate.  The graft was then trimmed to fit the size of the defect.  The graft was then placed in the primary defect and oriented appropriately.
O-Z Plasty Text: The defect edges were debeveled with a #15 scalpel blade. Given the location of the defect, shape of the defect and the proximity to free margins an O-Z plasty (double transposition flap) was deemed most appropriate. Using a sterile surgical marker, the appropriate transposition flaps were drawn incorporating the defect and placing the expected incisions within the relaxed skin tension lines where possible. The area thus outlined was incised deep to adipose tissue with a #15 scalpel blade. The skin margins were undermined to an appropriate distance in all directions utilizing iris scissors. Hemostasis was achieved with electrocautery. The flaps were then transposed and carried over into place, one clockwise and the other counterclockwise, and anchored with interrupted buried subcutaneous sutures.
Vermilion Border Text: The closure involved the vermilion border.
Anesthesia Volume In Cc: 6
Eye Clamp Note Details: An eye clamp was used during the procedure.
W Plasty Text: The lesion was extirpated to the level of the fat with a #15 scalpel blade. Given the location of the defect, shape of the defect and the proximity to free margins a W-plasty was deemed most appropriate for repair. Using a sterile surgical marker, the appropriate transposition arms of the W-plasty were drawn incorporating the defect and placing the expected incisions within the relaxed skin tension lines where possible. The area thus outlined was incised deep to adipose tissue with a #15 scalpel blade. The skin margins were undermined to an appropriate distance in all directions utilizing iris scissors. The opposing transposition arms were then transposed and carried over into place in opposite direction and anchored with interrupted buried subcutaneous sutures.
Complex Repair And V-Y Plasty Text: The defect edges were debeveled with a #15 scalpel blade.  The primary defect was closed partially with a complex linear closure.  Given the location of the remaining defect, shape of the defect and the proximity to free margins a V-Y plasty was deemed most appropriate for complete closure of the defect.  Using a sterile surgical marker, an appropriate advancement flap was drawn incorporating the defect and placing the expected incisions within the relaxed skin tension lines where possible. The area thus outlined was incised deep to adipose tissue with a #15 scalpel blade. The skin margins were undermined to an appropriate distance in all directions utilizing iris scissors and carried over to close the primary defect.
Hemigard Retention Suture: 2-0 Nylon
V-Y Plasty Text: The defect edges were debeveled with a #15 scalpel blade. Given the location of the defect, shape of the defect and the proximity to free margins an V-Y advancement flap was deemed most appropriate. Using a sterile surgical marker, an appropriate advancement flap was drawn incorporating the defect and placing the expected incisions within the relaxed skin tension lines where possible. The area thus outlined was incised deep to adipose tissue with a #15 scalpel blade. The skin margins were undermined to an appropriate distance in all directions utilizing iris scissors. Following this, the designed flap was advanced and carried over into the primary defect and sutured into place.
Lip Wedge Excision Repair Text: Given the location of the defect and the proximity to free margins a full thickness wedge repair was deemed most appropriate. Using a sterile surgical marker, the appropriate repair was drawn incorporating the defect and placing the expected incisions perpendicular to the vermilion border.  The vermilion border was also meticulously outlined to ensure appropriate reapproximation during the repair.  The area thus outlined was incised through and through with a #15 scalpel blade.  The muscularis and dermis were reaproximated with deep sutures following hemostasis. Care was taken to realign the vermilion border before proceeding with the superficial closure.  Once the vermilion was realigned the superfical and mucosal closure was finished.
Mercedes Flap Text: The defect edges were debeveled with a #15 scalpel blade. Given the location of the defect, shape of the defect and the proximity to free margins a Mercedes flap was deemed most appropriate. Using a sterile surgical marker, an appropriate advancement flap was drawn incorporating the defect and placing the expected incisions within the relaxed skin tension lines where possible. The area thus outlined was incised deep to adipose tissue with a #15 scalpel blade. The skin margins were undermined to an appropriate distance in all directions utilizing iris scissors. Following this, the designed flap was advanced and carried over into the primary defect and sutured into place.
Excision Depth: adipose tissue
Purse String (Simple) Text: Given the location of the defect and the characteristics of the surrounding skin a purse string simple closure was deemed most appropriate.  Undermining was performed circumferentially around the surgical defect.  A purse string suture was then placed and tightened.
Pre-Excision Curettage Text (Leave Blank If You Do Not Want): Prior to drawing the surgical margin the visible lesion was removed with curettage to clearly define the lesion size.
O-L Flap Text: The defect edges were debeveled with a #15 scalpel blade. Given the location of the defect, shape of the defect and the proximity to free margins an O-L flap was deemed most appropriate. Using a sterile surgical marker, an appropriate advancement flap was drawn incorporating the defect and placing the expected incisions within the relaxed skin tension lines where possible. The area thus outlined was incised deep to adipose tissue with a #15 scalpel blade. The skin margins were undermined to an appropriate distance in all directions utilizing iris scissors. Following this, the designed flap was advanced and carried over into the primary defect and sutured into place.
Complex Repair And Rotation Flap Text: The defect edges were debeveled with a #15 scalpel blade.  The primary defect was closed partially with a complex linear closure.  Given the location of the remaining defect, shape of the defect and the proximity to free margins a rotation flap was deemed most appropriate for complete closure of the defect.  Using a sterile surgical marker, an appropriate advancement flap was drawn incorporating the defect and placing the expected incisions within the relaxed skin tension lines where possible. The area thus outlined was incised deep to adipose tissue with a #15 scalpel blade. The skin margins were undermined to an appropriate distance in all directions utilizing iris scissors and carried over to close the primary defect.
Rotation Flap Text: The defect edges were debeveled with a #15 scalpel blade. Given the location of the defect, shape of the defect and the proximity to free margins a rotation flap was deemed most appropriate. Using a sterile surgical marker, an appropriate rotation flap was drawn incorporating the defect and placing the expected incisions within the relaxed skin tension lines where possible. The area thus outlined was incised deep to adipose tissue with a #15 scalpel blade. The skin margins were undermined to an appropriate distance in all directions utilizing iris scissors. Following this, the designed flap was carried over into the primary defect and sutured into place.
O-T Advancement Flap Text: The defect edges were debeveled with a #15 scalpel blade. Given the location of the defect, shape of the defect and the proximity to free margins an O-T advancement flap was deemed most appropriate. Using a sterile surgical marker, an appropriate advancement flap was drawn incorporating the defect and placing the expected incisions within the relaxed skin tension lines where possible. The area thus outlined was incised deep to adipose tissue with a #15 scalpel blade. The skin margins were undermined to an appropriate distance in all directions utilizing iris scissors. Following this, the designed flap was advanced and carried over into the primary defect and sutured into place.
Home Suture Removal Text: Patient was provided a home suture removal kit and will remove their sutures at home.  If they have any questions or difficulties they will call the office.
O-Z Flap Text: The defect edges were debeveled with a #15 scalpel blade. Given the location of the defect, shape of the defect and the proximity to free margins an O-Z flap was deemed most appropriate. Using a sterile surgical marker, an appropriate transposition flap was drawn incorporating the defect and placing the expected incisions within the relaxed skin tension lines where possible. The area thus outlined was incised deep to adipose tissue with a #15 scalpel blade. The skin margins were undermined to an appropriate distance in all directions utilizing iris scissors. Following this, the designed flap was carried over into the primary defect and sutured into place.
Excision Method: Fusiform
Lab: -[id number]
Complex Repair And Z Plasty Text: The defect edges were debeveled with a #15 scalpel blade.  The primary defect was closed partially with a complex linear closure.  Given the location of the remaining defect, shape of the defect and the proximity to free margins a Z plasty was deemed most appropriate for complete closure of the defect.  Using a sterile surgical marker, an appropriate advancement flap was drawn incorporating the defect and placing the expected incisions within the relaxed skin tension lines where possible. The area thus outlined was incised deep to adipose tissue with a #15 scalpel blade. The skin margins were undermined to an appropriate distance in all directions utilizing iris scissors and carried over to close the primary defect.
Ear Star Wedge Flap Text: The defect edges were debeveled with a #15 blade scalpel.  Given the location of the defect and the proximity to free margins (helical rim) an ear star wedge flap was deemed most appropriate. Using a sterile surgical marker, the appropriate flap was drawn incorporating the defect and placing the expected incisions between the helical rim and antihelix where possible.  The area thus outlined was incised through and through with a #15 scalpel blade. Following this, the designed flap was carried over into the primary defect and sutured into place.
Staged Advancement Flap Text: The defect edges were debeveled with a #15 scalpel blade. Given the location of the defect, shape of the defect and the proximity to free margins a staged advancement flap was deemed most appropriate. Using a sterile surgical marker, an appropriate advancement flap was drawn incorporating the defect and placing the expected incisions within the relaxed skin tension lines where possible. The area thus outlined was incised deep to adipose tissue with a #15 scalpel blade. The skin margins were undermined to an appropriate distance in all directions utilizing iris scissors. Following this, the designed flap was carried over into the primary defect and sutured into place.
Cheek-To-Nose Interpolation Flap Text: A decision was made to reconstruct the defect utilizing an interpolation axial flap and a staged reconstruction.  A telfa template was made of the defect.  This telfa template was then used to outline the Cheek-To-Nose Interpolation flap.  The donor area for the pedicle flap was then injected with anesthesia.  The flap was excised through the skin and subcutaneous tissue down to the layer of the underlying musculature.  The interpolation flap was carefully excised within this deep plane to maintain its blood supply.  The edges of the donor site were undermined.   The donor site was closed in a primary fashion.  The pedicle was then rotated into position and sutured.  Once the tube was sutured into place, adequate blood supply was confirmed with blanching and refill.  The pedicle was then wrapped with xeroform gauze and dressed appropriately with a telfa and gauze bandage to ensure continued blood supply and protect the attached pedicle.
Complex Repair And W Plasty Text: The defect edges were debeveled with a #15 scalpel blade.  The primary defect was closed partially with a complex linear closure.  Given the location of the remaining defect, shape of the defect and the proximity to free margins a W plasty was deemed most appropriate for complete closure of the defect.  Using a sterile surgical marker, an appropriate advancement flap was drawn incorporating the defect and placing the expected incisions within the relaxed skin tension lines where possible. The area thus outlined was incised deep to adipose tissue with a #15 scalpel blade. The skin margins were undermined to an appropriate distance in all directions utilizing iris scissors and carried over to close the primary defect.
Dressing: dry sterile dressing
Retention Suture Bite Size: 3 mm
Repair Type: Intermediate
Estlander Flap (Lower To Upper Lip) Text: The defect of the lower lip was assessed and measured.  Given the location and size of the defect, an Estlander flap was deemed most appropriate. Using a sterile surgical marker, an appropriate Estlander flap was measured and drawn on the upper lip. Local anesthesia was then infiltrated. A scalpel was then used to incise the lateral aspect of the flap, through skin, muscle and mucosa, leaving the flap pedicled medially.  The flap was then rotated and positioned to fill the lower lip defect.  The flap was then sutured into place with a three layer technique, closing the orbicularis oris muscle layer with subcutaneous buried sutures, followed by a mucosal layer and an epidermal layer.
Perilesional Excision Additional Text (Leave Blank If You Do Not Want): The margin was drawn around the clinically apparent lesion. Incisions were then made along these lines to the appropriate tissue plane and the lesion was extirpated.
Transposition Flap Text: The defect edges were debeveled with a #15 scalpel blade. Given the location of the defect and the proximity to free margins a transposition flap was deemed most appropriate. Using a sterile surgical marker, an appropriate transposition flap was drawn incorporating the defect. The area thus outlined was incised deep to adipose tissue with a #15 scalpel blade. The skin margins were undermined to an appropriate distance in all directions utilizing iris scissors. Following this, the designed flap was carried over into the primary defect and sutured into place.
Advancement Flap (Double) Text: The defect edges were debeveled with a #15 scalpel blade. Given the location of the defect and the proximity to free margins a double advancement flap was deemed most appropriate. Using a sterile surgical marker, the appropriate advancement flaps were drawn incorporating the defect and placing the expected incisions within the relaxed skin tension lines where possible. The area thus outlined was incised deep to adipose tissue with a #15 scalpel blade. The skin margins were undermined to an appropriate distance in all directions utilizing iris scissors. Following this, the designed flaps were advanced and carried over into the primary defect and sutured into place.
Complex Repair And Xenograft Text: The defect edges were debeveled with a #15 scalpel blade.  The primary defect was closed partially with a complex linear closure.  Given the location of the defect, shape of the defect and the proximity to free margins a xenograft was deemed most appropriate to repair the remaining defect.  The graft was trimmed to fit the size of the remaining defect.  The graft was then placed in the primary defect, oriented appropriately, and sutured into place.
Dorsal Nasal Flap Text: The defect edges were debeveled with a #15 scalpel blade. Given the location of the defect and the proximity to free margins a dorsal nasal flap was deemed most appropriate. Using a sterile surgical marker, an appropriate dorsal nasal flap was drawn around the defect. The area thus outlined was incised deep to adipose tissue with a #15 scalpel blade. The skin margins were undermined to an appropriate distance in all directions utilizing iris scissors. Following this, the designed flap was carried into the primary defect and sutured into place.
Double Island Pedicle Flap Text: The defect edges were debeveled with a #15 scalpel blade. Given the location of the defect, shape of the defect and the proximity to free margins a double island pedicle advancement flap was deemed most appropriate. Using a sterile surgical marker, an appropriate advancement flap was drawn incorporating the defect, outlining the appropriate donor tissue and placing the expected incisions within the relaxed skin tension lines where possible. The area thus outlined was incised deep to adipose tissue with a #15 scalpel blade. The skin margins were undermined to an appropriate distance in all directions around the primary defect and laterally outward around the island pedicle utilizing iris scissors.  There was minimal undermining beneath the pedicle flap. Following this, the flap was carried over into the primary defect and sutured into place.
Curvilinear Excision Additional Text (Leave Blank If You Do Not Want): The margin was drawn around the clinically apparent lesion.  A curvilinear shape was then drawn on the skin incorporating the lesion and margins.  Incisions were then made along these lines to the appropriate tissue plane and the lesion was extirpated.
Crescentic Advancement Flap Text: The defect edges were debeveled with a #15 scalpel blade. Given the location of the defect and the proximity to free margins a crescentic advancement flap was deemed most appropriate. Using a sterile surgical marker, the appropriate advancement flap was drawn incorporating the defect and placing the expected incisions within the relaxed skin tension lines where possible. The area thus outlined was incised deep to adipose tissue with a #15 scalpel blade. The skin margins were undermined to an appropriate distance in all directions utilizing iris scissors. Following this, the designed flap was advanced and carried over into the primary defect and sutured into place.
Chonodrocutaneous Helical Advancement Flap Text: The defect edges were debeveled with a #15 scalpel blade. Given the location of the defect and the proximity to free margins a chondrocutaneous helical advancement flap was deemed most appropriate. Using a sterile surgical marker, the appropriate advancement flap was drawn incorporating the defect and placing the expected incisions within the relaxed skin tension lines where possible. The area thus outlined was incised deep to adipose tissue with a #15 scalpel blade. The skin margins were undermined to an appropriate distance in all directions utilizing iris scissors. Following this, the designed flap was advanced and carried over into the primary defect and sutured into place.
Bilobed Transposition Flap Text: The defect edges were debeveled with a #15 scalpel blade. Given the location of the defect and the proximity to free margins a bilobed transposition flap was deemed most appropriate. Using a sterile surgical marker, an appropriate bilobe flap drawn around the defect. The area thus outlined was incised deep to adipose tissue with a #15 scalpel blade. The skin margins were undermined to an appropriate distance in all directions utilizing iris scissors. Following this, the designed flap was carried over into the primary defect and sutured into place.
Spiral Flap Text: The defect edges were debeveled with a #15 scalpel blade. Given the location of the defect, shape of the defect and the proximity to free margins a spiral flap was deemed most appropriate. Using a sterile surgical marker, an appropriate rotation flap was drawn incorporating the defect and placing the expected incisions within the relaxed skin tension lines where possible. The area thus outlined was incised deep to adipose tissue with a #15 scalpel blade. The skin margins were undermined to an appropriate distance in all directions utilizing iris scissors. Following this, the designed flap was carried over into the primary defect and sutured into place.
Wound Care: Petrolatum
Pinch Graft Text: The defect edges were debeveled with a #15 scalpel blade. Given the location of the defect, shape of the defect and the proximity to free margins a pinch graft was deemed most appropriate. Using a sterile surgical marker, the primary defect shape was transferred to the donor site. The area thus outlined was incised deep to adipose tissue with a #15 scalpel blade.  The harvested graft was then trimmed of adipose tissue until only dermis and epidermis was left. The skin margins of the secondary defect were undermined to an appropriate distance in all directions utilizing iris scissors.  The secondary defect was closed with interrupted buried subcutaneous sutures.  The skin edges were then re-apposed with running  sutures.  The skin graft was then placed in the primary defect and oriented appropriately.
Double O-Z Flap Text: The defect edges were debeveled with a #15 scalpel blade. Given the location of the defect, shape of the defect and the proximity to free margins a Double O-Z flap was deemed most appropriate. Using a sterile surgical marker, an appropriate transposition flap was drawn incorporating the defect and placing the expected incisions within the relaxed skin tension lines where possible. The area thus outlined was incised deep to adipose tissue with a #15 scalpel blade. The skin margins were undermined to an appropriate distance in all directions utilizing iris scissors. Following this, the designed flap was carried over into the primary defect and sutured into place.
Keystone Flap Text: The defect edges were debeveled with a #15 scalpel blade. Given the location of the defect, shape of the defect a keystone flap was deemed most appropriate. Using a sterile surgical marker, an appropriate keystone flap was drawn incorporating the defect, outlining the appropriate donor tissue and placing the expected incisions within the relaxed skin tension lines where possible. The area thus outlined was incised deep to adipose tissue with a #15 scalpel blade. The skin margins were undermined to an appropriate distance in all directions around the primary defect and laterally outward around the flap utilizing iris scissors. Following this, the designed flap was carried into the primary defect and sutured into place.
Orbicularis Oris Muscle Flap Text: The defect edges were debeveled with a #15 scalpel blade.  Given that the defect affected the competency of the oral sphincter an orbicularis oris muscle flap was deemed most appropriate to restore this competency and normal muscle function.  Using a sterile surgical marker, an appropriate flap was drawn incorporating the defect. The area thus outlined was incised with a #15 scalpel blade. Following this, the designed flap was carried over into the primary defect and sutured into place.
Deep Sutures: 3-0 Vicryl
Size Of Margin In Cm: 0.4
Trilobed Flap Text: The defect edges were debeveled with a #15 scalpel blade. Given the location of the defect and the proximity to free margins a trilobed flap was deemed most appropriate. Using a sterile surgical marker, an appropriate trilobed flap was drawn around the defect. The area thus outlined was incised deep to adipose tissue with a #15 scalpel blade. The skin margins were undermined to an appropriate distance in all directions utilizing iris scissors. Following this, the designed flap was carried into the primary defect and sutured into place.
Z Plasty Text: The lesion was extirpated to the level of the fat with a #15 scalpel blade. Given the location of the defect, shape of the defect and the proximity to free margins a Z-plasty was deemed most appropriate for repair. Using a sterile surgical marker, the appropriate transposition arms of the Z-plasty were drawn incorporating the defect and placing the expected incisions within the relaxed skin tension lines where possible. The area thus outlined was incised deep to adipose tissue with a #15 scalpel blade. The skin margins were undermined to an appropriate distance in all directions utilizing iris scissors. The opposing transposition arms were then transposed and carried over into place in opposite direction and anchored with interrupted buried subcutaneous sutures.
Cheek Interpolation Flap Text: A decision was made to reconstruct the defect utilizing an interpolation axial flap and a staged reconstruction.  A telfa template was made of the defect.  This telfa template was then used to outline the Cheek Interpolation flap.  The donor area for the pedicle flap was then injected with anesthesia.  The flap was excised through the skin and subcutaneous tissue down to the layer of the underlying musculature.  The interpolation flap was carefully excised within this deep plane to maintain its blood supply.  The edges of the donor site were undermined.   The donor site was closed in a primary fashion.  The pedicle was then rotated into position and sutured.  Once the tube was sutured into place, adequate blood supply was confirmed with blanching and refill.  The pedicle was then wrapped with xeroform gauze and dressed appropriately with a telfa and gauze bandage to ensure continued blood supply and protect the attached pedicle.
Positioning (Leave Blank If You Do Not Want): The patient was placed in a comfortable position exposing the surgical site.
Path Notes (To The Dermatopathologist): Please check margins.
Mustarde Flap Text: The defect edges were debeveled with a #15 scalpel blade.  Given the size, depth and location of the defect and the proximity to free margins a Mustarde flap was deemed most appropriate. Using a sterile surgical marker, an appropriate flap was drawn incorporating the defect. The area thus outlined was incised with a #15 scalpel blade. The skin margins were undermined to an appropriate distance in all directions utilizing iris scissors. Following this, the designed flap was carried into the primary defect and sutured into place.
Nasalis-Muscle-Based Myocutaneous Island Pedicle Flap Text: Using a #15 blade, an incision was made around the donor flap to the level of the nasalis muscle. Wide lateral undermining was then performed in both the subcutaneous plane above the nasalis muscle, and in a submuscular plane just above periosteum. This allowed the formation of a free nasalis muscle axial pedicle (based on the angular artery) which was still attached to the actual cutaneous flap, increasing its mobility and vascular viability. Hemostasis was obtained with pinpoint electrocoagulation. The flap was mobilized into position and the pivotal anchor points positioned and stabilized with buried interrupted sutures. Subcutaneous and dermal tissues were closed in a multilayered fashion with sutures. Tissue redundancies were excised, and the epidermal edges were apposed without significant tension and sutured with sutures.
Partial Purse String (Intermediate) Text: Given the location of the defect and the characteristics of the surrounding skin an intermediate purse string closure was deemed most appropriate.  Undermining was performed circumferentially around the surgical defect.  A purse string suture was then placed and tightened. Wound tension of the circular defect prevented complete closure of the wound.
Dermal Autograft Text: The defect edges were debeveled with a #15 scalpel blade. Given the location of the defect, shape of the defect and the proximity to free margins a dermal autograft was deemed most appropriate. Using a sterile surgical marker, the primary defect shape was transferred to the donor site. The area thus outlined was incised deep to adipose tissue with a #15 scalpel blade.  The harvested graft was then trimmed of adipose and epidermal tissue until only dermis was left.  The skin graft was then placed in the primary defect and oriented appropriately.
Information: Selecting Yes will display possible errors in your note based on the variables you have selected. This validation is only offered as a suggestion for you. PLEASE NOTE THAT THE VALIDATION TEXT WILL BE REMOVED WHEN YOU FINALIZE YOUR NOTE. IF YOU WANT TO FAX A PRELIMINARY NOTE YOU WILL NEED TO TOGGLE THIS TO 'NO' IF YOU DO NOT WANT IT IN YOUR FAXED NOTE.
Rhombic Flap Text: The defect edges were debeveled with a #15 scalpel blade. Given the location of the defect and the proximity to free margins a rhombic flap was deemed most appropriate. Using a sterile surgical marker, an appropriate rhombic flap was drawn incorporating the defect. The area thus outlined was incised deep to adipose tissue with a #15 scalpel blade. The skin margins were undermined to an appropriate distance in all directions utilizing iris scissors. Following this, the designed flap was carried over into the primary defect and sutured into place.
Burow's Advancement Flap Text: The defect edges were debeveled with a #15 scalpel blade. Given the location of the defect and the proximity to free margins a Burow's advancement flap was deemed most appropriate. Using a sterile surgical marker, the appropriate advancement flap was drawn incorporating the defect and placing the expected incisions within the relaxed skin tension lines where possible. The area thus outlined was incised deep to adipose tissue with a #15 scalpel blade. The skin margins were undermined to an appropriate distance in all directions utilizing iris scissors. Following this, the designed flap was advanced and carried over into the primary defect and sutured into place.
Abbe Flap (Lower To Upper Lip) Text: The defect of the upper lip was assessed and measured.  Given the location and size of the defect, an Abbe flap was deemed most appropriate. Using a sterile surgical marker, an appropriate Abbe flap was measured and drawn on the lower lip. Local anesthesia was then infiltrated. A scalpel was then used to incise the upper lip through and through the skin, vermilion, muscle and mucosa, leaving the flap pedicled on the opposite side.  The flap was then rotated and transferred to the lower lip defect.  The flap was then sutured into place with a three layer technique, closing the orbicularis oris muscle layer with subcutaneous buried sutures, followed by a mucosal layer and an epidermal layer.
Hatchet Flap Text: The defect edges were debeveled with a #15 scalpel blade. Given the location of the defect, shape of the defect and the proximity to free margins a hatchet flap was deemed most appropriate. Using a sterile surgical marker, an appropriate hatchet flap was drawn incorporating the defect and placing the expected incisions within the relaxed skin tension lines where possible. The area thus outlined was incised deep to adipose tissue with a #15 scalpel blade. The skin margins were undermined to an appropriate distance in all directions utilizing iris scissors. Following this, the designed flap was carried over into the primary defect and sutured into place.
Bilateral Helical Rim Advancement Flap Text: The defect edges were debeveled with a #15 blade scalpel.  Given the location of the defect and the proximity to free margins (helical rim) a bilateral helical rim advancement flap was deemed most appropriate. Using a sterile surgical marker, the appropriate advancement flaps were drawn incorporating the defect and placing the expected incisions between the helical rim and antihelix where possible.  The area thus outlined was incised through and through with a #15 scalpel blade.  With a skin hook and iris scissors, the flaps were gently and sharply undermined and freed up. Following this, the designed flaps were placed into the primary defect and sutured into place.
Saucerization Depth: dermis and superficial adipose tissue
Hemigard Intro: Due to skin fragility and wound tension, it was decided to use HEMIGARD adhesive retention suture devices to permit a linear closure. The skin was cleaned and dried for a 6cm distance away from the wound. Excessive hair, if present, was removed to allow for adhesion.
No Repair - Repaired With Adjacent Surgical Defect Text (Leave Blank If You Do Not Want): After the excision the defect was repaired concurrently with another surgical defect which was in close approximation.
Nasal Turnover Hinge Flap Text: The defect edges were debeveled with a #15 scalpel blade.  Given the size, depth, location of the defect and the defect being full thickness a nasal turnover hinge flap was deemed most appropriate. Using a sterile surgical marker, an appropriate hinge flap was drawn incorporating the defect. The area thus outlined was incised with a #15 scalpel blade. The flap was designed to recreate the nasal mucosal lining and the alar rim. The skin margins were undermined to an appropriate distance in all directions utilizing iris scissors. Following this, the designed flap was carried over into the primary defect and sutured into place
Ftsg Text: The defect edges were debeveled with a #15 scalpel blade. Given the location of the defect, shape of the defect and the proximity to free margins a full thickness skin graft was deemed most appropriate. Using a sterile surgical marker, the primary defect shape was transferred to the donor site. The area thus outlined was incised deep to adipose tissue with a #15 scalpel blade.  The harvested graft was then trimmed of adipose tissue until only dermis and epidermis was left.  The skin margins of the secondary defect were undermined to an appropriate distance in all directions utilizing iris scissors.  The secondary defect was closed with interrupted buried subcutaneous sutures.  The skin edges were then re-apposed with running  sutures.  The skin graft was then placed in the primary defect and oriented appropriately.
Size Of Lesion In Cm: 1.5
Suture Removal: 14 days
Island Pedicle Flap With Canthal Suspension Text: The defect edges were debeveled with a #15 scalpel blade. Given the location of the defect, shape of the defect and the proximity to free margins an island pedicle advancement flap was deemed most appropriate. Using a sterile surgical marker, an appropriate advancement flap was drawn incorporating the defect, outlining the appropriate donor tissue and placing the expected incisions within the relaxed skin tension lines where possible. The area thus outlined was incised deep to adipose tissue with a #15 scalpel blade. The skin margins were undermined to an appropriate distance in all directions around the primary defect and laterally outward around the island pedicle utilizing iris scissors.  There was minimal undermining beneath the pedicle flap. A suspension suture was placed in the canthal tendon to prevent tension and prevent ectropion. Following this, the designed flap was placed into the primary defect and sutured into place.
Cartilage Graft Text: The defect edges were debeveled with a #15 scalpel blade. Given the location of the defect, shape of the defect, the fact the defect involved a full thickness cartilage defect a cartilage graft was deemed most appropriate.  An appropriate donor site was identified, cleansed, and anesthetized. The cartilage graft was then harvested and transferred to the recipient site, oriented appropriately and then sutured into place.  The secondary defect was then repaired using a primary closure.
Complex Repair And Single Advancement Flap Text: The defect edges were debeveled with a #15 scalpel blade.  The primary defect was closed partially with a complex linear closure.  Given the location of the remaining defect, shape of the defect and the proximity to free margins a single advancement flap was deemed most appropriate for complete closure of the defect.  Using a sterile surgical marker, an appropriate advancement flap was drawn incorporating the defect and placing the expected incisions within the relaxed skin tension lines where possible. The area thus outlined was incised deep to adipose tissue with a #15 scalpel blade. The skin margins were undermined to an appropriate distance in all directions utilizing iris scissors and carried over to close the primary defect.
Partial Purse String (Simple) Text: Given the location of the defect and the characteristics of the surrounding skin a simple purse string closure was deemed most appropriate.  Undermining was performed circumferentially around the surgical defect.  A purse string suture was then placed and tightened. Wound tension of the circular defect prevented complete closure of the wound.
Suturegard Intro: Intraoperative tissue expansion was performed, utilizing the SUTUREGARD device, in order to reduce wound tension.
Complex Repair And Burow's Graft Text: The defect edges were debeveled with a #15 scalpel blade.  The primary defect was closed partially with a complex linear closure.  Given the location of the defect, shape of the defect, the proximity to free margins and the presence of a standing cone deformity a Burow's graft was deemed most appropriate to repair the remaining defect.  The graft was trimmed to fit the size of the remaining defect.  The graft was then placed in the primary defect, oriented appropriately, and sutured into place.
Complex Repair And Dermal Autograft Text: The defect edges were debeveled with a #15 scalpel blade.  The primary defect was closed partially with a complex linear closure.  Given the location of the defect, shape of the defect and the proximity to free margins an dermal autograft was deemed most appropriate to repair the remaining defect.  The graft was trimmed to fit the size of the remaining defect.  The graft was then placed in the primary defect, oriented appropriately, and sutured into place.
Estimated Blood Loss (Cc): minimal
Complex Repair And M Plasty Text: The defect edges were debeveled with a #15 scalpel blade.  The primary defect was closed partially with a complex linear closure.  Given the location of the remaining defect, shape of the defect and the proximity to free margins an M plasty was deemed most appropriate for complete closure of the defect.  Using a sterile surgical marker, an appropriate advancement flap was drawn incorporating the defect and placing the expected incisions within the relaxed skin tension lines where possible. The area thus outlined was incised deep to adipose tissue with a #15 scalpel blade. The skin margins were undermined to an appropriate distance in all directions utilizing iris scissors and carried over to close the primary defect.
Complex Repair And Epidermal Autograft Text: The defect edges were debeveled with a #15 scalpel blade.  The primary defect was closed partially with a complex linear closure.  Given the location of the defect, shape of the defect and the proximity to free margins an epidermal autograft was deemed most appropriate to repair the remaining defect.  The graft was trimmed to fit the size of the remaining defect.  The graft was then placed in the primary defect, oriented appropriately, and sutured into place.
Muscle Hinge Flap Text: The defect edges were debeveled with a #15 scalpel blade.  Given the size, depth and location of the defect and the proximity to free margins a muscle hinge flap was deemed most appropriate. Using a sterile surgical marker, an appropriate hinge flap was drawn incorporating the defect. The area thus outlined was incised with a #15 scalpel blade. The skin margins were undermined to an appropriate distance in all directions utilizing iris scissors. Following this, the designed flap was carried into the primary defect and sutured into place.
Debridement Text: The wound edges were debrided prior to proceeding with the closure to facilitate wound healing.
Banner Transposition Flap Text: The defect edges were debeveled with a #15 scalpel blade. Given the location of the defect and the proximity to free margins a Banner transposition flap was deemed most appropriate. Using a sterile surgical marker, an appropriate flap was drawn around the defect. The area thus outlined was incised deep to adipose tissue with a #15 scalpel blade. The skin margins were undermined to an appropriate distance in all directions utilizing iris scissors. Following this, the designed flap was carried into the primary defect and sutured into place.
Adjacent Tissue Transfer Text: The defect edges were debeveled with a #15 scalpel blade. Given the location of the defect and the proximity to free margins an adjacent tissue transfer was deemed most appropriate. Using a sterile surgical marker, an appropriate flap was drawn incorporating the defect and placing the expected incisions within the relaxed skin tension lines where possible. The area thus outlined was incised deep to adipose tissue with a #15 scalpel blade. The skin margins were undermined to an appropriate distance in all directions utilizing iris scissors and carried over to close the primary defect.
Complex Repair And Melolabial Flap Text: The defect edges were debeveled with a #15 scalpel blade.  The primary defect was closed partially with a complex linear closure.  Given the location of the remaining defect, shape of the defect and the proximity to free margins a melolabial flap was deemed most appropriate for complete closure of the defect.  Using a sterile surgical marker, an appropriate advancement flap was drawn incorporating the defect and placing the expected incisions within the relaxed skin tension lines where possible. The area thus outlined was incised deep to adipose tissue with a #15 scalpel blade. The skin margins were undermined to an appropriate distance in all directions utilizing iris scissors and carried over to close the primary defect.
Billing Type: Third-Party Bill
Graft Donor Site Bandage (Optional-Leave Blank If You Don't Want In Note): Steri-strips and a pressure bandage were applied to the donor site.
Purse String (Intermediate) Text: Given the location of the defect and the characteristics of the surrounding skin a purse string intermediate closure was deemed most appropriate.  Undermining was performed circumferentially around the surgical defect.  A purse string suture was then placed and tightened.
V-Y Flap Text: The defect edges were debeveled with a #15 scalpel blade. Given the location of the defect, shape of the defect and the proximity to free margins a V-Y flap was deemed most appropriate. Using a sterile surgical marker, an appropriate advancement flap was drawn incorporating the defect and placing the expected incisions within the relaxed skin tension lines where possible. The area thus outlined was incised deep to adipose tissue with a #15 scalpel blade. The skin margins were undermined to an appropriate distance in all directions utilizing iris scissors. Following this, the designed flap was advanced and carried over into the primary defect and sutured into place.
Complex Repair And Rhombic Flap Text: The defect edges were debeveled with a #15 scalpel blade.  The primary defect was closed partially with a complex linear closure.  Given the location of the remaining defect, shape of the defect and the proximity to free margins a rhombic flap was deemed most appropriate for complete closure of the defect.  Using a sterile surgical marker, an appropriate advancement flap was drawn incorporating the defect and placing the expected incisions within the relaxed skin tension lines where possible. The area thus outlined was incised deep to adipose tissue with a #15 scalpel blade. The skin margins were undermined to an appropriate distance in all directions utilizing iris scissors and carried over to close the primary defect.
Advancement Flap (Single) Text: The defect edges were debeveled with a #15 scalpel blade. Given the location of the defect and the proximity to free margins a single advancement flap was deemed most appropriate. Using a sterile surgical marker, an appropriate advancement flap was drawn incorporating the defect and placing the expected incisions within the relaxed skin tension lines where possible. The area thus outlined was incised deep to adipose tissue with a #15 scalpel blade. The skin margins were undermined to an appropriate distance in all directions utilizing iris scissors. Following this, the designed flap was advanced and carried over into the primary defect and sutured into place.
Epidermal Autograft Text: The defect edges were debeveled with a #15 scalpel blade. Given the location of the defect, shape of the defect and the proximity to free margins an epidermal autograft was deemed most appropriate. Using a sterile surgical marker, the primary defect shape was transferred to the donor site. The epidermal graft was then harvested.  The skin graft was then placed in the primary defect and oriented appropriately.
Composite Graft Text: The defect edges were debeveled with a #15 scalpel blade. Given the location of the defect, shape of the defect, the proximity to free margins and the fact the defect was full thickness a composite graft was deemed most appropriate.  The defect was outline and then transferred to the donor site.  A full thickness graft was then excised from the donor site. The graft was then placed in the primary defect, oriented appropriately and then sutured into place.  The secondary defect was then repaired using a primary closure.
Complex Repair And Split-Thickness Skin Graft Text: The defect edges were debeveled with a #15 scalpel blade.  The primary defect was closed partially with a complex linear closure.  Given the location of the defect, shape of the defect and the proximity to free margins a split thickness skin graft was deemed most appropriate to repair the remaining defect.  The graft was trimmed to fit the size of the remaining defect.  The graft was then placed in the primary defect, oriented appropriately, and sutured into place.
Bilobed Flap Text: The defect edges were debeveled with a #15 scalpel blade. Given the location of the defect and the proximity to free margins a bilobe flap was deemed most appropriate. Using a sterile surgical marker, an appropriate bilobe flap drawn around the defect. The area thus outlined was incised deep to adipose tissue with a #15 scalpel blade. The skin margins were undermined to an appropriate distance in all directions utilizing iris scissors. Following this, the designed flap was carried over into the primary defect and sutured into place.
Xenograft Text: The defect edges were debeveled with a #15 scalpel blade. Given the location of the defect, shape of the defect and the proximity to free margins a xenograft was deemed most appropriate.  The graft was then trimmed to fit the size of the defect.  The graft was then placed in the primary defect and oriented appropriately.
Epidermal Closure: simple interrupted
Consent was obtained from the patient. The risks and benefits to therapy were discussed in detail. Specifically, the risks of infection, scarring, bleeding, prolonged wound healing, incomplete removal, allergy to anesthesia, nerve injury and recurrence were addressed. Prior to the procedure, the treatment site was clearly identified and confirmed by the patient.
Double O-Z Plasty Text: The defect edges were debeveled with a #15 scalpel blade. Given the location of the defect, shape of the defect and the proximity to free margins a Double O-Z plasty (double transposition flap) was deemed most appropriate. Using a sterile surgical marker, the appropriate transposition flaps were drawn incorporating the defect and placing the expected incisions within the relaxed skin tension lines where possible. The area thus outlined was incised deep to adipose tissue with a #15 scalpel blade. The skin margins were undermined to an appropriate distance in all directions utilizing iris scissors. Hemostasis was achieved with electrocautery. The flaps were then transposed and carried over into place, one clockwise and the other counterclockwise, and anchored with interrupted buried subcutaneous sutures.
Island Pedicle Flap Text: The defect edges were debeveled with a #15 scalpel blade. Given the location of the defect, shape of the defect and the proximity to free margins an island pedicle advancement flap was deemed most appropriate. Using a sterile surgical marker, an appropriate advancement flap was drawn incorporating the defect, outlining the appropriate donor tissue and placing the expected incisions within the relaxed skin tension lines where possible. The area thus outlined was incised deep to adipose tissue with a #15 scalpel blade. The skin margins were undermined to an appropriate distance in all directions around the primary defect and laterally outward around the island pedicle utilizing iris scissors.  There was minimal undermining beneath the pedicle flap. Following this, the flap was carried over into the primary defect and sutured into place.
Skin Substitute Text: The defect edges were debeveled with a #15 scalpel blade. Given the location of the defect, shape of the defect and the proximity to free margins a skin substitute graft was deemed most appropriate.  The graft material was trimmed to fit the size of the defect. The graft was then placed in the primary defect and oriented appropriately.
Split-Thickness Skin Graft Text: The defect edges were debeveled with a #15 scalpel blade. Given the location of the defect, shape of the defect and the proximity to free margins a split thickness skin graft was deemed most appropriate. Using a sterile surgical marker, the primary defect shape was transferred to the donor site. The split thickness graft was then harvested.  The skin graft was then placed in the primary defect and oriented appropriately.
Burow's Graft Text: The defect edges were debeveled with a #15 scalpel blade. Given the location of the defect, shape of the defect, the proximity to free margins and the presence of a standing cone deformity a Burow's skin graft was deemed most appropriate. The standing cone was removed and this tissue was then trimmed to the shape of the primary defect. The adipose tissue was also removed until only dermis and epidermis were left.  The skin margins of the secondary defect were undermined to an appropriate distance in all directions utilizing iris scissors.  The secondary defect was closed with interrupted buried subcutaneous sutures.  The skin edges were then re-apposed with running  sutures.  The skin graft was then placed in the primary defect and oriented appropriately.
Undermining Type: Entire Wound
Anesthesia Type: 0.5% lidocaine with 1:200,000 epinephrine and a 1:10 solution of 8.4% sodium bicarbonate
Complex Repair And Double Advancement Flap Text: The defect edges were debeveled with a #15 scalpel blade.  The primary defect was closed partially with a complex linear closure.  Given the location of the remaining defect, shape of the defect and the proximity to free margins a double advancement flap was deemed most appropriate for complete closure of the defect.  Using a sterile surgical marker, an appropriate advancement flap was drawn incorporating the defect and placing the expected incisions within the relaxed skin tension lines where possible. The area thus outlined was incised deep to adipose tissue with a #15 scalpel blade. The skin margins were undermined to an appropriate distance in all directions utilizing iris scissors and carried over to close the primary defect.
Advancement-Rotation Flap Text: The defect edges were debeveled with a #15 scalpel blade. Given the location of the defect, shape of the defect and the proximity to free margins an advancement-rotation flap was deemed most appropriate. Using a sterile surgical marker, an appropriate flap was drawn incorporating the defect and placing the expected incisions within the relaxed skin tension lines where possible. The area thus outlined was incised deep to adipose tissue with a #15 scalpel blade. The skin margins were undermined to an appropriate distance in all directions utilizing iris scissors. Following this, the designed flap was carried over into the primary defect and sutured into place.
Interpolation Flap Text: A decision was made to reconstruct the defect utilizing an interpolation axial flap and a staged reconstruction.  A telfa template was made of the defect.  This telfa template was then used to outline the interpolation flap.  The donor area for the pedicle flap was then injected with anesthesia.  The flap was excised through the skin and subcutaneous tissue down to the layer of the underlying musculature.  The interpolation flap was carefully excised within this deep plane to maintain its blood supply.  The edges of the donor site were undermined.   The donor site was closed in a primary fashion.  The pedicle was then rotated into position and sutured.  Once the tube was sutured into place, adequate blood supply was confirmed with blanching and refill.  The pedicle was then wrapped with xeroform gauze and dressed appropriately with a telfa and gauze bandage to ensure continued blood supply and protect the attached pedicle.
Complex Repair And Bilobe Flap Text: The defect edges were debeveled with a #15 scalpel blade.  The primary defect was closed partially with a complex linear closure.  Given the location of the remaining defect, shape of the defect and the proximity to free margins a bilobe flap was deemed most appropriate for complete closure of the defect.  Using a sterile surgical marker, an appropriate advancement flap was drawn incorporating the defect and placing the expected incisions within the relaxed skin tension lines where possible. The area thus outlined was incised deep to adipose tissue with a #15 scalpel blade. The skin margins were undermined to an appropriate distance in all directions utilizing iris scissors and carried over to close the primary defect.
Nostril Rim Text: The closure involved the nostril rim.
Complex Repair And Transposition Flap Text: The defect edges were debeveled with a #15 scalpel blade.  The primary defect was closed partially with a complex linear closure.  Given the location of the remaining defect, shape of the defect and the proximity to free margins a transposition flap was deemed most appropriate for complete closure of the defect.  Using a sterile surgical marker, an appropriate advancement flap was drawn incorporating the defect and placing the expected incisions within the relaxed skin tension lines where possible. The area thus outlined was incised deep to adipose tissue with a #15 scalpel blade. The skin margins were undermined to an appropriate distance in all directions utilizing iris scissors and carried over to close the primary defect.
Fusiform Excision Additional Text (Leave Blank If You Do Not Want): The margin was drawn around the clinically apparent lesion.  A fusiform shape was then drawn on the skin incorporating the lesion and margins.  Incisions were then made along these lines to the appropriate tissue plane and the lesion was extirpated.
Complex Repair And O-L Flap Text: The defect edges were debeveled with a #15 scalpel blade.  The primary defect was closed partially with a complex linear closure.  Given the location of the remaining defect, shape of the defect and the proximity to free margins an O-L flap was deemed most appropriate for complete closure of the defect.  Using a sterile surgical marker, an appropriate flap was drawn incorporating the defect and placing the expected incisions within the relaxed skin tension lines where possible. The area thus outlined was incised deep to adipose tissue with a #15 scalpel blade. The skin margins were undermined to an appropriate distance in all directions utilizing iris scissors and carried over to close the primary defect.
Complex Repair And Dorsal Nasal Flap Text: The defect edges were debeveled with a #15 scalpel blade.  The primary defect was closed partially with a complex linear closure.  Given the location of the remaining defect, shape of the defect and the proximity to free margins a dorsal nasal flap was deemed most appropriate for complete closure of the defect.  Using a sterile surgical marker, an appropriate flap was drawn incorporating the defect and placing the expected incisions within the relaxed skin tension lines where possible. The area thus outlined was incised deep to adipose tissue with a #15 scalpel blade. The skin margins were undermined to an appropriate distance in all directions utilizing iris scissors and carried over to close the primary defect.
H Plasty Text: Given the location of the defect, shape of the defect and the proximity to free margins a H-plasty was deemed most appropriate for repair. Using a sterile surgical marker, the appropriate advancement arms of the H-plasty were drawn incorporating the defect and placing the expected incisions within the relaxed skin tension lines where possible. The area thus outlined was incised deep to adipose tissue with a #15 scalpel blade. The skin margins were undermined to an appropriate distance in all directions utilizing iris scissors.  The opposing advancement arms were then advanced and carried over into place in opposite direction and anchored with interrupted buried subcutaneous sutures.
Hemigard Postcare Instructions: The HEMIGARD strips are to remain completely dry for at least 5-7 days.
Mastoid Interpolation Flap Text: A decision was made to reconstruct the defect utilizing an interpolation axial flap and a staged reconstruction.  A telfa template was made of the defect.  This telfa template was then used to outline the mastoid interpolation flap.  The donor area for the pedicle flap was then injected with anesthesia.  The flap was excised through the skin and subcutaneous tissue down to the layer of the underlying musculature.  The pedicle flap was carefully excised within this deep plane to maintain its blood supply.  The edges of the donor site were undermined.   The donor site was closed in a primary fashion.  The pedicle was then rotated into position and sutured.  Once the tube was sutured into place, adequate blood supply was confirmed with blanching and refill.  The pedicle was then wrapped with xeroform gauze and dressed appropriately with a telfa and gauze bandage to ensure continued blood supply and protect the attached pedicle.
Posterior Auricular Interpolation Flap Text: A decision was made to reconstruct the defect utilizing an interpolation axial flap and a staged reconstruction.  A telfa template was made of the defect.  This telfa template was then used to outline the posterior auricular interpolation flap.  The donor area for the pedicle flap was then injected with anesthesia.  The flap was excised through the skin and subcutaneous tissue down to the layer of the underlying musculature.  The pedicle flap was carefully excised within this deep plane to maintain its blood supply.  The edges of the donor site were undermined.   The donor site was closed in a primary fashion.  The pedicle was then rotated into position and sutured.  Once the tube was sutured into place, adequate blood supply was confirmed with blanching and refill.  The pedicle was then wrapped with xeroform gauze and dressed appropriately with a telfa and gauze bandage to ensure continued blood supply and protect the attached pedicle.
Mucosal Advancement Flap Text: Given the location of the defect, shape of the defect and the proximity to free margins a mucosal advancement flap was deemed most appropriate. Incisions were made with a 15 blade scalpel in the appropriate fashion along the cutaneous vermilion border and the mucosal lip. The remaining actinically damaged mucosal tissue was excised.  The mucosal advancement flap was then elevated to the gingival sulcus with care taken to preserve the neurovascular structures and advanced into the primary defect. Care was taken to ensure that precise realignment of the vermilion border was achieved.
Alar Island Pedicle Flap Text: The defect edges were debeveled with a #15 scalpel blade. Given the location of the defect, shape of the defect and the proximity to the alar rim an island pedicle advancement flap was deemed most appropriate. Using a sterile surgical marker, an appropriate advancement flap was drawn incorporating the defect, outlining the appropriate donor tissue and placing the expected incisions within the nasal ala running parallel to the alar rim. The area thus outlined was incised with a #15 scalpel blade. The skin margins were undermined minimally to an appropriate distance in all directions around the primary defect and laterally outward around the island pedicle utilizing iris scissors.  There was minimal undermining beneath the pedicle flap. Following this, the designed flap was carried over into the primary defect and sutured into place.
O-T Plasty Text: The defect edges were debeveled with a #15 scalpel blade. Given the location of the defect, shape of the defect and the proximity to free margins an O-T plasty was deemed most appropriate. Using a sterile surgical marker, an appropriate O-T plasty was drawn incorporating the defect and placing the expected incisions within the relaxed skin tension lines where possible. The area thus outlined was incised deep to adipose tissue with a #15 scalpel blade. The skin margins were undermined to an appropriate distance in all directions utilizing iris scissors. Following this, the designed flap was carried over into the primary defect and sutured into place.
Star Wedge Flap Text: The defect edges were debeveled with a #15 scalpel blade. Given the location of the defect, shape of the defect and the proximity to free margins a star wedge flap was deemed most appropriate. Using a sterile surgical marker, an appropriate rotation flap was drawn incorporating the defect and placing the expected incisions within the relaxed skin tension lines where possible. The area thus outlined was incised deep to adipose tissue with a #15 scalpel blade. The skin margins were undermined to an appropriate distance in all directions utilizing iris scissors. Following this, the designed flap was carried over into the primary defect and sutured into place.
Scalpel Size: 15 blade
Repair Depth: use same depth as excision depth
Rectangular Flap Text: The defect edges were debeveled with a #15 scalpel blade. Given the location of the defect and the proximity to free margins a rectangular flap was deemed most appropriate. Using a sterile surgical marker, an appropriate rectangular flap was drawn incorporating the defect. The area thus outlined was incised deep to adipose tissue with a #15 scalpel blade. The skin margins were undermined to an appropriate distance in all directions utilizing iris scissors. Following this, the designed flap was carried over into the primary defect and sutured into place.
Nasolabial Transposition Flap Text: The defect edges were debeveled with a #15 scalpel blade.  Given the size, depth and location of the defect and the proximity to free margins a nasolabial transposition flap was deemed most appropriate. Using a sterile surgical marker, an appropriate flap was drawn incorporating the defect. The area thus outlined was incised with a #15 scalpel blade. The skin margins were undermined to an appropriate distance in all directions utilizing iris scissors. Following this, the designed flap was carried into the primary defect and sutured into place.
Nasalis Myocutaneous Flap Text: Using a #15 blade, an incision was made around the donor flap to the level of the nasalis muscle. Wide lateral undermining was then performed in both the subcutaneous plane above the nasalis muscle, and in a submuscular plane just above periosteum. This allowed the formation of a free nasalis muscle axial pedicle which was still attached to the actual cutaneous flap, increasing its mobility and vascular viability. Hemostasis was obtained with pinpoint electrocoagulation. The flap was mobilized into position and the pivotal anchor points positioned and stabilized with buried interrupted sutures. Subcutaneous and dermal tissues were closed in a multilayered fashion with sutures. Tissue redundancies were excised, and the epidermal edges were apposed without significant tension and sutured with sutures.

## 2024-06-18 ENCOUNTER — APPOINTMENT (OUTPATIENT)
Dept: ULTRASOUND IMAGING | Age: 84
End: 2024-06-18
Attending: NURSE PRACTITIONER

## 2024-06-18 ENCOUNTER — APPOINTMENT (OUTPATIENT)
Dept: GENERAL RADIOLOGY | Age: 84
End: 2024-06-18
Attending: NURSE PRACTITIONER

## 2024-06-18 ENCOUNTER — HOSPITAL ENCOUNTER (OUTPATIENT)
Age: 84
Discharge: HOME | End: 2024-06-18

## 2024-06-18 VITALS
DIASTOLIC BLOOD PRESSURE: 67 MMHG | OXYGEN SATURATION: 95 % | TEMPERATURE: 98 F | HEART RATE: 74 BPM | SYSTOLIC BLOOD PRESSURE: 147 MMHG | RESPIRATION RATE: 20 BRPM

## 2024-06-18 DIAGNOSIS — L08.9 SKIN INFECTION: Primary | ICD-10-CM

## 2024-06-18 LAB
#MXD IC: 1.3 X10ˆ3/UL (ref 0.1–1)
BUN BLD-MCNC: 26 MG/DL (ref 7–18)
CHLORIDE BLD-SCNC: 106 MMOL/L (ref 98–112)
CO2 BLD-SCNC: 27 MMOL/L (ref 21–32)
CREAT BLD-MCNC: 1.4 MG/DL
EGFRCR SERPLBLD CKD-EPI 2021: 50 ML/MIN/1.73M2 (ref 60–?)
GLUCOSE BLD-MCNC: 114 MG/DL (ref 70–99)
HCT VFR BLD AUTO: 47.4 %
HCT VFR BLD CALC: 49 %
HGB BLD-MCNC: 15.9 G/DL
ISTAT IONIZED CALCIUM FOR CHEM 8: 1.14 MMOL/L (ref 1.12–1.32)
LYMPHOCYTES # BLD AUTO: 0.8 X10ˆ3/UL (ref 1–4)
LYMPHOCYTES NFR BLD AUTO: 9.9 %
MCH RBC QN AUTO: 31.5 PG (ref 26–34)
MCHC RBC AUTO-ENTMCNC: 33.5 G/DL (ref 31–37)
MCV RBC AUTO: 94 FL (ref 80–100)
MIXED CELL %: 16.5 %
NEUTROPHILS # BLD AUTO: 5.9 X10ˆ3/UL (ref 1.5–7.7)
NEUTROPHILS NFR BLD AUTO: 73.6 %
PLATELET # BLD AUTO: 210 X10ˆ3/UL (ref 150–450)
POTASSIUM BLD-SCNC: 3.8 MMOL/L (ref 3.6–5.1)
RBC # BLD AUTO: 5.04 X10ˆ6/UL
SODIUM BLD-SCNC: 141 MMOL/L (ref 136–145)
WBC # BLD AUTO: 8 X10ˆ3/UL (ref 4–11)

## 2024-06-18 PROCEDURE — 99214 OFFICE O/P EST MOD 30 MIN: CPT

## 2024-06-18 PROCEDURE — 85025 COMPLETE CBC W/AUTO DIFF WBC: CPT | Performed by: NURSE PRACTITIONER

## 2024-06-18 PROCEDURE — 80047 BASIC METABLC PNL IONIZED CA: CPT

## 2024-06-18 PROCEDURE — 36415 COLL VENOUS BLD VENIPUNCTURE: CPT

## 2024-06-18 PROCEDURE — 93971 EXTREMITY STUDY: CPT | Performed by: NURSE PRACTITIONER

## 2024-06-18 PROCEDURE — 73590 X-RAY EXAM OF LOWER LEG: CPT | Performed by: NURSE PRACTITIONER

## 2024-06-18 RX ORDER — CEFADROXIL 500 MG/1
500 CAPSULE ORAL 2 TIMES DAILY
Qty: 20 CAPSULE | Refills: 0 | Status: SHIPPED | OUTPATIENT
Start: 2024-06-18 | End: 2024-06-28

## 2024-06-18 NOTE — ED PROVIDER NOTES
Patient Seen in: [facility]      History     Chief Complaint   Patient presents with    Leg Swelling     Stated Complaint: L leg swollen and red  Subjective:   83-year-old male with a extensive medical history presents for left lower extremity erythema and swelling.  He states yesterday he was working outside in the yard.  He noticed some bugs to the distal left lower extremity.  There are multiple abrasions present with surrounding redness.  The area is warm to touch.  It is noncircumferential.  He denies any pain or itching.  No drainage or bleeding from the abrasions.  No posterior calf redness, pain, or swelling.  He has extensive swelling to both lower extremities, but today the left lower extremity is worse.  No fevers.  No history of diabetes.  No change in sensation.  No weakness.  He appears nontoxic. UTD Td.      Objective:   Past Medical History:    Bipolar affective (HCC)    Cancer (HCC)    Depression    Diverticulitis    Diverticulosis of large intestine    Essential hypertension    High blood pressure    High cholesterol    ESTHER (obstructive sleep apnea)    AHI 28 RDI 28 REM AHI 22 Supine AHI 0 non-supine AHI 29 Sao2 Ugo 73%     Osteoarthritis    Pulmonary embolism (HCC)    Sleep apnea    Visual impairment    1 contact in left eye            Past Surgical History:   Procedure Laterality Date    Appendectomy      15-20 years ago.    Colonoscopy N/A 8/28/2017    Procedure: COLONOSCOPY, POSSIBLE BIOPSY, POSSIBLE POLYPECTOMY 15206;  Surgeon: Elver Ellis MD;  Location: [facility]    Colonoscopy N/A 9/24/2019    Procedure: COLONOSCOPY, POSSIBLE BIOPSY, POSSIBLE POLYPECTOMY 70356;  Surgeon: Marciano Tyler MD;  Location: [facility]    Other      Carpal tunnel surgery              Social History     Socioeconomic History    Marital status:    Tobacco Use    Smoking status: Former     Current packs/day: 0.50     Average packs/day: 0.5 packs/day for  10.0 years (5.0 ttl pk-yrs)     Types: Cigars, Cigarettes    Smokeless tobacco: Current   Substance and Sexual Activity    Alcohol use: Yes     Alcohol/week: 0.0 standard drinks of alcohol     Comment: Weekends    Drug use: No            Review of Systems    Positive for stated complaint: L leg swollen and red  Other systems are as noted in HPI.  Constitutional and vital signs reviewed.      All other systems reviewed and negative except as noted above.    Physical Exam     ED Triage Vitals [06/18/24 1537]   /67   Pulse 74   Resp (!) 28   Temp 97.7 °F (36.5 °C)   Temp src Temporal   SpO2 95 %   O2 Device None (Room air)     Current:/67   Pulse 74   Temp 97.7 °F (36.5 °C) (Temporal)   Resp 20   SpO2 95%     Physical Exam  Vitals and nursing note reviewed.   Constitutional:       General: He is not in acute distress.     Appearance: Normal appearance. He is not toxic-appearing.   HENT:      Mouth/Throat:      Mouth: Mucous membranes are moist.   Cardiovascular:      Rate and Rhythm: Normal rate and regular rhythm.   Pulmonary:      Effort: Pulmonary effort is normal.      Breath sounds: Normal breath sounds.   Musculoskeletal:         General: Swelling present.      Left lower leg: Edema present.      Comments: Swelling to the distal left lower extremity.  No bony tenderness.  Ambulatory without difficulty.   Skin:     Capillary Refill: Capillary refill takes less than 2 seconds.      Comments: Multiple small abrasions to the anterior aspect of the left lower extremity with surrounding erythema.  The area is warm to touch.  The erythema is noncircumferential.  No active bleeding or pus drainage.   Neurological:      General: No focal deficit present.      Mental Status: He is alert and oriented to person, place, and time.   Psychiatric:         Mood and Affect: Mood normal.         Behavior: Behavior normal.            Media Information  File Link    Photos/Colored Images - Scan on 6/18/2024 4:24 PM by  Austin Loja APRN        [place] Information    Document ID File Type Document Type Description   [id number] Image Photos/Colored Images      Import Information    Attached At Date Time User Dept   Encounter Level 6/18/2024  4:24 PM Austin Loja APRN [facility]     Encounter    Hospital Encounter on 6/18/24     Media Information   Document Information    Photos/Colored Images      06/18/2024 16:24   Attached To:   Hospital Encounter on 6/18/24     Source Information    Austin Loja APRN [facility]   Document History         ED Course   XR TIBIA + FIBULA (2 VIEWS), LEFT (CPT=73590)    Result Date: 6/18/2024  CONCLUSION:  1. Soft tissue swelling with calcifications related to chronic venous stasis.  No soft tissue gas or osteomyelitis.    Dictated by (CST): Iker Flanagan MD on 6/18/2024 at 4:45 PM     Finalized by (CST): Iker Flanagan MD on 6/18/2024 at 4:46 PM          US VENOUS DOPPLER LEG LEFT - DIAG IMG (CPT=93971)    Result Date: 6/18/2024  CONCLUSION:   Poor visualization of the left posterior tibial and peroneal veins.  Otherwise no DVT within the left lower extremity.   Dictated by (CST): Sukhwinder Ware MD on 6/18/2024 at 4:14 PM     Finalized by (CST): Sukhwinder Ware MD on 6/18/2024 at 4:14 PM         Labs Reviewed   POCT CBC - Abnormal; Notable for the following components:       Result Value    # Lymphocyte 0.8 (*)     # Mixed Cells 1.3 (*)     All other components within normal limits   POCT ISTAT CHEM8 CARTRIDGE - Abnormal; Notable for the following components:    ISTAT BUN 26 (*)     ISTAT Glucose 114 (*)     ISTAT Creatinine 1.40 (*)     eGFR-Cr 50 (*)     All other components within normal limits       MDM     Medical Decision Making  The CBC is within normal limits.  The i-STAT shows a GFR of 50, he is generally around this range.  The ultrasound is negative for DVT.  The x-ray shows some calcifications without osteomyelitis or fracture.  The patient and his wife are  aware of all of these results.  I will place him on Duricef treating him for a cellulitis.  We discussed wound care regarding the abrasions.  We discussed elevation and warm compresses to the area.  They will call his PMD tomorrow to arrange close follow-up.  He and his wife are aware if he develops any fevers or any other worsening or concerning symptoms, to go to the nearest emergency department for further evaluation.    Amount and/or Complexity of Data Reviewed  Independent Historian: spouse     Details: Mother  External Data Reviewed: labs.     Details: Most recent GFR from July 2023 was 56.  Labs: ordered.     Details: The CBC is unremarkable.  The i-STAT shows a GFR of 50, his most recent GFR was about 9 months ago was 56.  Radiology: ordered.     Details: The ultrasound shows no evidence DVT.  The x-ray is negative for any osteomyelitis with some soft tissue swelling and calcifications related to chronic venous stasis.  Discussion of management or test interpretation with external provider(s): I discussed this patient with Dr. Sosa.  He agrees with the plan of care.    Risk  Prescription drug management.  Risk Details: DVT versus cellulitis        Disposition and Plan     Clinical Impression:  1. Skin infection         Disposition:  Discharge  6/18/2024  5:05 pm    Follow-up:  Sonny Ramirez MD  [facility] [address] [address] [phone]    Call in 1 day  to arrange follow up          Medications Prescribed:  Current Discharge Medication List        START taking these medications    Details   cefadroxil 500 MG Oral Cap Take 1 capsule (500 mg total) by mouth 2 (two) times daily for 10 days.  Qty: 20 capsule, Refills: 0

## 2024-06-18 NOTE — DISCHARGE INSTRUCTIONS
Elevate the left leg.  Take the antibiotics as prescribed.  Warm compresses to the area.  Call tomorrow to arrange close follow-up with your primary care doctor.  If you develop any fevers or other worsening or concerning symptoms, please go to the nearest emergency department for further evaluation.

## 2024-06-18 NOTE — ED INITIAL ASSESSMENT (HPI)
Onset of LLE swelling, redness, and warm to touch since yesterday. Small scabbed area noted to shin. Possible insect bite. Patient was scratching area. No fever. Denies pain.

## 2024-07-01 ENCOUNTER — APPOINTMENT (OUTPATIENT)
Dept: URBAN - METROPOLITAN AREA CLINIC 244 | Age: 84
Setting detail: DERMATOLOGY
End: 2024-07-02

## 2024-07-01 DIAGNOSIS — Z48.02 ENCOUNTER FOR REMOVAL OF SUTURES: ICD-10-CM

## 2024-07-01 PROCEDURE — 99024 POSTOP FOLLOW-UP VISIT: CPT

## 2024-07-01 PROCEDURE — OTHER SUTURE REMOVAL (GLOBAL PERIOD): OTHER

## 2024-07-01 ASSESSMENT — LOCATION SIMPLE DESCRIPTION DERM: LOCATION SIMPLE: LEFT SHOULDER

## 2024-07-01 ASSESSMENT — LOCATION DETAILED DESCRIPTION DERM: LOCATION DETAILED: LEFT POSTERIOR SHOULDER

## 2024-07-01 ASSESSMENT — LOCATION ZONE DERM: LOCATION ZONE: ARM

## 2024-07-01 NOTE — PROCEDURE: SUTURE REMOVAL (GLOBAL PERIOD)
Detail Level: Detailed
Add 25728 Cpt? (Important Note: In 2017 The Use Of 18731 Is Being Tracked By Cms To Determine Future Global Period Reimbursement For Global Periods): yes

## 2024-07-10 ENCOUNTER — APPOINTMENT (OUTPATIENT)
Dept: URBAN - METROPOLITAN AREA CLINIC 244 | Age: 84
Setting detail: DERMATOLOGY
End: 2024-07-10

## 2024-07-10 DIAGNOSIS — L57.0 ACTINIC KERATOSIS: ICD-10-CM

## 2024-07-10 DIAGNOSIS — D22 MELANOCYTIC NEVI: ICD-10-CM

## 2024-07-10 DIAGNOSIS — L72.8 OTHER FOLLICULAR CYSTS OF THE SKIN AND SUBCUTANEOUS TISSUE: ICD-10-CM

## 2024-07-10 DIAGNOSIS — L90.5 SCAR CONDITIONS AND FIBROSIS OF SKIN: ICD-10-CM

## 2024-07-10 PROBLEM — D22.39 MELANOCYTIC NEVI OF OTHER PARTS OF FACE: Status: ACTIVE | Noted: 2024-07-10

## 2024-07-10 PROCEDURE — OTHER LIQUID NITROGEN: OTHER

## 2024-07-10 PROCEDURE — 17003 DESTRUCT PREMALG LES 2-14: CPT

## 2024-07-10 PROCEDURE — OTHER COUNSELING: OTHER

## 2024-07-10 PROCEDURE — OTHER DIAGNOSIS COMMENT: OTHER

## 2024-07-10 PROCEDURE — 17000 DESTRUCT PREMALG LESION: CPT

## 2024-07-10 PROCEDURE — 99213 OFFICE O/P EST LOW 20 MIN: CPT | Mod: 25

## 2024-07-10 ASSESSMENT — LOCATION DETAILED DESCRIPTION DERM
LOCATION DETAILED: RIGHT CENTRAL PARIETAL SCALP
LOCATION DETAILED: LEFT MEDIAL FRONTAL SCALP
LOCATION DETAILED: RIGHT LATERAL MALAR CHEEK
LOCATION DETAILED: LEFT SUPERIOR PARIETAL SCALP
LOCATION DETAILED: RIGHT SUPERIOR PARIETAL SCALP
LOCATION DETAILED: RIGHT ANTIHELIX

## 2024-07-10 ASSESSMENT — LOCATION SIMPLE DESCRIPTION DERM
LOCATION SIMPLE: LEFT SCALP
LOCATION SIMPLE: SCALP
LOCATION SIMPLE: RIGHT EAR
LOCATION SIMPLE: RIGHT CHEEK

## 2024-07-10 ASSESSMENT — LOCATION ZONE DERM
LOCATION ZONE: FACE
LOCATION ZONE: SCALP
LOCATION ZONE: EAR

## 2024-07-10 NOTE — PROCEDURE: DIAGNOSIS COMMENT
Render Risk Assessment In Note?: no
Comment: Has had for many years per pt's wife unchanged.
Detail Level: Simple

## 2024-07-10 NOTE — PROCEDURE: LIQUID NITROGEN
Detail Level: Zone
Render Post-Care Instructions In Note?: yes
Total Number Of Aks Treated: 10
Number Of Freeze-Thaw Cycles: 1 freeze-thaw cycle
Post-Care Instructions: I reviewed with the patient in detail post-care instructions. Patient is to wear sunprotection, and avoid picking at any of the treated lesions. Pt may apply Vaseline to crusted or scabbing areas.
Consent: The patient's consent was obtained after discussing risks/benefits, and alternatives to the procedure including but not limited to risks of pain, crusting/scabbing, blistering, scarring, darker or lighter pigmentary change, recurrence, incomplete removal and infection. Patient had the opportunity to ask questions and understand the purpose of the treatment, the benefits, the risks to the treatment and what other treatments could be utilized. Treatment was only provided after there was certainty that the patient felt knowledgeable/comfortable enough about the treatment to proceed with it. Patient verbalized understanding of the above. \\n\\nOther discussion points regarding treatment:\\n- Patient aware that I cannot guarantee cosmetic outcome of any liquid nitrogen application and that this treatment is being done for medical purposes. \\n- Patient is aware that treatment today will likely produce erythema and some swelling (and sometimes blistering) that may be cosmetically unappealing to the patient if they have any speaking engagements or events in the coming weeks. These are especially pronounced in the few days after initial treatment.\\n- Patient is aware that this treatment does not mean that patient will not develop further actinic keratoses in the future and appropriate surveillance should be done moving forward (at least once a year but more often if further lesions noted). The patient is aware that this treatment does not guarantee the treatment of any lesion treated and that reassessment is required if a lesion does NOT resolve (typically within 1 months time resolution would be expected as discussed with pt). I do recommend follow up in office so I can properly assess if it has resolved, this was discussed and offered. \\n- If any singular spot on pt's body were to grow/bleed/worsen (whether it is an actinic keratosis that we treated today or another lesion present elsewhere), pt knows it is imperative that we assess in clinic in a timely fashion to evaluate and develop a treatment plan.\\n\\nThis is not a cosmetic service and is expected to be covered under insurance plans but co-insurance or copays may apply. Pt is aware of this and that I cannot predict co-pays/co-insurance costs.

## 2024-07-10 NOTE — PROCEDURE: COUNSELING
Nicotinamide Supplementation Recommendations: All supplements should be USP (https://www.usp.org/verification-services/verified-jono).
Sunscreen Recommendations: Brands include neutrogena, La-Roche, and Elta-MD. Rec mineral sunscreen use (zinc/titanium dioxide) over chemical sunscreens due to safety.
Detail Level: Detailed
Detail Level: Simple

## 2025-02-23 ENCOUNTER — APPOINTMENT (OUTPATIENT)
Dept: ULTRASOUND IMAGING | Age: 85
End: 2025-02-23
Attending: NURSE PRACTITIONER
Payer: MEDICARE

## 2025-02-23 ENCOUNTER — HOSPITAL ENCOUNTER (OUTPATIENT)
Age: 85
Discharge: HOME | End: 2025-02-23
Payer: MEDICARE

## 2025-02-23 VITALS
SYSTOLIC BLOOD PRESSURE: 108 MMHG | OXYGEN SATURATION: 95 % | DIASTOLIC BLOOD PRESSURE: 64 MMHG | RESPIRATION RATE: 24 BRPM | HEART RATE: 77 BPM | TEMPERATURE: 97 F

## 2025-02-23 DIAGNOSIS — L03.90 CELLULITIS, UNSPECIFIED CELLULITIS SITE: Primary | ICD-10-CM

## 2025-02-23 PROCEDURE — 99214 OFFICE O/P EST MOD 30 MIN: CPT

## 2025-02-23 PROCEDURE — 93971 EXTREMITY STUDY: CPT | Performed by: NURSE PRACTITIONER

## 2025-02-23 RX ORDER — CEPHALEXIN 500 MG/1
500 CAPSULE ORAL 4 TIMES DAILY
Qty: 40 CAPSULE | Refills: 0 | Status: SHIPPED | OUTPATIENT
Start: 2025-02-23 | End: 2025-03-05

## 2025-02-23 NOTE — ED INITIAL ASSESSMENT (HPI)
Patient reports having more edema to his left lower extremity with redness and is warm to touch starting today. Patient is on Eliquis for PE's in the past.

## 2025-02-23 NOTE — ED PROVIDER NOTES
Patient Seen in: [facility]      History     Chief Complaint   Patient presents with   • Swelling Edema     Stated Complaint: Leg Issue    Subjective:   HPI    84-year-old male presents with increased left lower leg swelling and redness that started today.  Patient normally has bilateral leg swelling and erythema consistent peripheral vascular disease.  Patient denies shortness of breath.  Patient does have a history of pulmonary embolism.  He currently is taking Eliquis and states he is taking it as prescribed.      Objective:     Past Medical History:   • Bipolar affective (HCC)   • Cancer (HCC)   • Depression   • Diverticulitis   • Diverticulosis of large intestine   • Essential hypertension   • High blood pressure   • High cholesterol   • ESTHER (obstructive sleep apnea)    AHI 28 RDI 28 REM AHI 22 Supine AHI 0 non-supine AHI 29 Sao2 Ugo 73%    • Osteoarthritis   • Pulmonary embolism (HCC)   • Sleep apnea   • Visual impairment    1 contact in left eye              Past Surgical History:   Procedure Laterality Date   • Appendectomy      15-20 years ago.   • Colonoscopy N/A 8/28/2017    Procedure: COLONOSCOPY, POSSIBLE BIOPSY, POSSIBLE POLYPECTOMY 69509;  Surgeon: Elver Ellis MD;  Location: [facility]   • Colonoscopy N/A 9/24/2019    Procedure: COLONOSCOPY, POSSIBLE BIOPSY, POSSIBLE POLYPECTOMY 54458;  Surgeon: Marciano Tyler MD;  Location: [facility]   • Other      Carpal tunnel surgery                No pertinent social history.            Review of Systems    Positive for stated complaint: Leg Issue  Other systems are as noted in HPI.  Constitutional and vital signs reviewed.      All other systems reviewed and negative except as noted above.    Physical Exam     ED Triage Vitals [02/23/25 1020]   /64   Pulse 77   Resp 24   Temp 97.4 °F (36.3 °C)   Temp src Oral   SpO2 95 %   O2 Device None (Room air)       Current Vitals:   Vital Signs  BP: 108/64  Pulse:  77  Resp: 24  Temp: 97.4 °F (36.3 °C)  Temp src: Oral    Oxygen Therapy  SpO2: 95 %  O2 Device: None (Room air)        Physical Exam  Vitals reviewed.   Cardiovascular:      Rate and Rhythm: Normal rate and regular rhythm.   Pulmonary:      Effort: Pulmonary effort is normal.      Breath sounds: Normal breath sounds.   Musculoskeletal:         General: Swelling and tenderness present. No deformity or signs of injury.      Right lower leg: Edema present.      Left lower leg: Edema present.   Neurological:      General: No focal deficit present.      Mental Status: He is alert and oriented to person, place, and time.   Psychiatric:         Behavior: Behavior normal.           ED Course   Labs Reviewed - No data to display                MDM              Medical Decision Making  Amount and/or Complexity of Data Reviewed  Radiology: ordered.     Details: LLE US images reviewed by IC provider.  Shows neg DVT        Disposition and Plan     Clinical Impression:  No diagnosis found.     Disposition:  There is no disposition on file for this visit.  There is no disposition time on file for this visit.    Follow-up:  No follow-up provider specified.        Medications Prescribed:  Current Discharge Medication List              Supplementary Documentation:                                                                 Danis Holloway MD on 2/23/2025 at 11:05 AM       Finalized by (CST): Danis Holloway MD on 2/23/2025 at 11:06 AM                               [facility]              Medical Decision Making  Amount and/or Complexity of Data Reviewed  Radiology: ordered.     Details: LLE US images reviewed by IC provider.  Shows neg DVT        Disposition and Plan     Clinical Impression:  No diagnosis found.     Disposition:  There is no disposition on file for this visit.  There is no disposition time on file for this visit.    Follow-up:  No follow-up provider specified.        Medications Prescribed:  Current Discharge Medication List              Supplementary Documentation:

## 2025-05-14 ENCOUNTER — LAB ENCOUNTER (OUTPATIENT)
Dept: LAB | Age: 85
End: 2025-05-14
Attending: INTERNAL MEDICINE
Payer: MEDICARE

## 2025-05-14 DIAGNOSIS — R53.83 FATIGUE: ICD-10-CM

## 2025-05-14 DIAGNOSIS — E78.01 FAMILIAL HYPERCHOLESTEROLEMIA: Primary | ICD-10-CM

## 2025-05-14 DIAGNOSIS — Z82.49 FAMILY HISTORY OF ISCHEMIC HEART DISEASE: ICD-10-CM

## 2025-05-14 DIAGNOSIS — I10 ESSENTIAL HYPERTENSION, MALIGNANT: ICD-10-CM

## 2025-05-14 DIAGNOSIS — Z78.9 PERSON LIVING IN RESIDENTIAL INSTITUTION: ICD-10-CM

## 2025-05-14 DIAGNOSIS — Z85.46 PERSONAL HISTORY OF MALIGNANT NEOPLASM OF PROSTATE: ICD-10-CM

## 2025-05-14 DIAGNOSIS — D50.0 IRON DEFICIENCY ANEMIA SECONDARY TO BLOOD LOSS (CHRONIC): ICD-10-CM

## 2025-05-14 LAB
ALBUMIN SERPL-MCNC: 4.1 G/DL (ref 3.2–4.8)
ALBUMIN/GLOB SERPL: 1.9 {RATIO} (ref 1–2)
ALP LIVER SERPL-CCNC: 111 U/L (ref 45–117)
ALT SERPL-CCNC: 23 U/L (ref 10–49)
ANION GAP SERPL CALC-SCNC: 5 MMOL/L (ref 0–18)
AST SERPL-CCNC: 29 U/L (ref ?–34)
BASOPHILS # BLD AUTO: 0.05 X10(3) UL (ref 0–0.2)
BASOPHILS NFR BLD AUTO: 0.7 %
BILIRUB SERPL-MCNC: 1 MG/DL (ref 0.2–1.1)
BUN BLD-MCNC: 18 MG/DL (ref 9–23)
BUN/CREAT SERPL: 12.9 (ref 10–20)
CALCIUM BLD-MCNC: 8.8 MG/DL (ref 8.7–10.4)
CHLORIDE SERPL-SCNC: 106 MMOL/L (ref 98–112)
CHOLEST SERPL-MCNC: 156 MG/DL (ref ?–200)
CO2 SERPL-SCNC: 28 MMOL/L (ref 21–32)
CREAT BLD-MCNC: 1.4 MG/DL (ref 0.7–1.3)
DEPRECATED RDW RBC AUTO: 45.3 FL (ref 35.1–46.3)
EGFRCR SERPLBLD CKD-EPI 2021: 50 ML/MIN/1.73M2 (ref 60–?)
EOSINOPHIL # BLD AUTO: 0.17 X10(3) UL (ref 0–0.7)
EOSINOPHIL NFR BLD AUTO: 2.4 %
ERYTHROCYTE [DISTWIDTH] IN BLOOD BY AUTOMATED COUNT: 12.7 % (ref 11–15)
FASTING PATIENT LIPID ANSWER: YES
FASTING STATUS PATIENT QL REPORTED: YES
GLOBULIN PLAS-MCNC: 2.2 G/DL (ref 2–3.5)
GLUCOSE BLD-MCNC: 113 MG/DL (ref 70–99)
HCT VFR BLD AUTO: 51.1 % (ref 39–53)
HDLC SERPL-MCNC: 60 MG/DL (ref 40–59)
HGB BLD-MCNC: 16.8 G/DL (ref 13–17.5)
IMM GRANULOCYTES # BLD AUTO: 0.01 X10(3) UL (ref 0–1)
IMM GRANULOCYTES NFR BLD: 0.1 %
IRON SATN MFR SERPL: 41 % (ref 20–50)
IRON SERPL-MCNC: 115 UG/DL (ref 65–175)
LDLC SERPL CALC-MCNC: 80 MG/DL (ref ?–100)
LYMPHOCYTES # BLD AUTO: 0.82 X10(3) UL (ref 1–4)
LYMPHOCYTES NFR BLD AUTO: 11.7 %
MCH RBC QN AUTO: 31.6 PG (ref 26–34)
MCHC RBC AUTO-ENTMCNC: 32.9 G/DL (ref 31–37)
MCV RBC AUTO: 96.2 FL (ref 80–100)
MONOCYTES # BLD AUTO: 0.77 X10(3) UL (ref 0.1–1)
MONOCYTES NFR BLD AUTO: 11 %
NEUTROPHILS # BLD AUTO: 5.16 X10 (3) UL (ref 1.5–7.7)
NEUTROPHILS # BLD AUTO: 5.16 X10(3) UL (ref 1.5–7.7)
NEUTROPHILS NFR BLD AUTO: 74.1 %
NONHDLC SERPL-MCNC: 96 MG/DL (ref ?–130)
OSMOLALITY SERPL CALC.SUM OF ELEC: 291 MOSM/KG (ref 275–295)
PLATELET # BLD AUTO: 171 10(3)UL (ref 150–450)
POTASSIUM SERPL-SCNC: 4.3 MMOL/L (ref 3.5–5.1)
PROT SERPL-MCNC: 6.3 G/DL (ref 5.7–8.2)
RBC # BLD AUTO: 5.31 X10(6)UL (ref 3.8–5.8)
SODIUM SERPL-SCNC: 139 MMOL/L (ref 136–145)
TOTAL IRON BINDING CAPACITY: 282 UG/DL (ref 250–425)
TRANSFERRIN SERPL-MCNC: 216 MG/DL (ref 215–365)
TRIGL SERPL-MCNC: 85 MG/DL (ref 30–149)
VLDLC SERPL CALC-MCNC: 13 MG/DL (ref 0–30)
WBC # BLD AUTO: 7 X10(3) UL (ref 4–11)

## 2025-05-14 PROCEDURE — 84466 ASSAY OF TRANSFERRIN: CPT

## 2025-05-14 PROCEDURE — 83540 ASSAY OF IRON: CPT

## 2025-05-14 PROCEDURE — 83695 ASSAY OF LIPOPROTEIN(A): CPT

## 2025-05-14 PROCEDURE — 80053 COMPREHEN METABOLIC PANEL: CPT

## 2025-05-14 PROCEDURE — 36415 COLL VENOUS BLD VENIPUNCTURE: CPT

## 2025-05-14 PROCEDURE — 80061 LIPID PANEL: CPT

## 2025-05-14 PROCEDURE — 85025 COMPLETE CBC W/AUTO DIFF WBC: CPT

## 2025-05-16 ENCOUNTER — LAB ENCOUNTER (OUTPATIENT)
Dept: LAB | Age: 85
End: 2025-05-16
Attending: INTERNAL MEDICINE
Payer: MEDICARE

## 2025-05-16 DIAGNOSIS — Z85.46 PERSONAL HISTORY OF MALIGNANT NEOPLASM OF PROSTATE: ICD-10-CM

## 2025-05-16 DIAGNOSIS — D50.0 IRON DEFICIENCY ANEMIA SECONDARY TO BLOOD LOSS (CHRONIC): ICD-10-CM

## 2025-05-16 DIAGNOSIS — I10 ESSENTIAL HYPERTENSION, MALIGNANT: ICD-10-CM

## 2025-05-16 LAB
BILIRUB UR QL: NEGATIVE
CLARITY UR: CLEAR
GLUCOSE UR-MCNC: NORMAL MG/DL
HGB UR QL STRIP.AUTO: NEGATIVE
KETONES UR-MCNC: NEGATIVE MG/DL
LEUKOCYTE ESTERASE UR QL STRIP.AUTO: NEGATIVE
LIPOPROTEIN (A): 294 NMOL/L
NITRITE UR QL STRIP.AUTO: NEGATIVE
PH UR: 6.5 [PH] (ref 5–8)
PROT UR-MCNC: NEGATIVE MG/DL
SP GR UR STRIP: 1.01 (ref 1–1.03)
UROBILINOGEN UR STRIP-ACNC: NORMAL

## 2025-05-16 PROCEDURE — 81003 URINALYSIS AUTO W/O SCOPE: CPT

## 2025-06-08 ENCOUNTER — APPOINTMENT (OUTPATIENT)
Dept: GENERAL RADIOLOGY | Facility: HOSPITAL | Age: 85
End: 2025-06-08
Attending: EMERGENCY MEDICINE
Payer: MEDICARE

## 2025-06-08 ENCOUNTER — APPOINTMENT (OUTPATIENT)
Dept: ULTRASOUND IMAGING | Facility: HOSPITAL | Age: 85
End: 2025-06-08
Attending: EMERGENCY MEDICINE
Payer: MEDICARE

## 2025-06-08 ENCOUNTER — HOSPITAL ENCOUNTER (INPATIENT)
Facility: HOSPITAL | Age: 85
LOS: 6 days | Discharge: HOME | End: 2025-06-16
Attending: EMERGENCY MEDICINE | Admitting: INTERNAL MEDICINE
Payer: MEDICARE

## 2025-06-08 DIAGNOSIS — I10 HYPERTENSION, UNSPECIFIED TYPE: ICD-10-CM

## 2025-06-08 DIAGNOSIS — R01.1 HEART MURMUR: ICD-10-CM

## 2025-06-08 DIAGNOSIS — E78.00 HIGH CHOLESTEROL: ICD-10-CM

## 2025-06-08 DIAGNOSIS — R53.81 PHYSICAL DECONDITIONING: ICD-10-CM

## 2025-06-08 DIAGNOSIS — I50.32 CHRONIC HEART FAILURE WITH PRESERVED EJECTION FRACTION (HCC): ICD-10-CM

## 2025-06-08 DIAGNOSIS — R60.0 PERIPHERAL EDEMA: Primary | ICD-10-CM

## 2025-06-08 DIAGNOSIS — R58 ECCHYMOSIS ON EXAMINATION: ICD-10-CM

## 2025-06-08 DIAGNOSIS — I25.10 CORONARY ARTERY DISEASE INVOLVING NATIVE CORONARY ARTERY OF NATIVE HEART WITHOUT ANGINA PECTORIS: ICD-10-CM

## 2025-06-08 LAB
ALBUMIN SERPL-MCNC: 3.8 G/DL (ref 3.2–4.8)
ALBUMIN/GLOB SERPL: 1.8 {RATIO} (ref 1–2)
ALP LIVER SERPL-CCNC: 115 U/L (ref 45–117)
ALT SERPL-CCNC: 21 U/L (ref 10–49)
ANION GAP SERPL CALC-SCNC: 5 MMOL/L (ref 0–18)
AST SERPL-CCNC: 23 U/L (ref ?–34)
BASOPHILS # BLD AUTO: 0.05 X10(3) UL (ref 0–0.2)
BASOPHILS NFR BLD AUTO: 0.6 %
BILIRUB SERPL-MCNC: 0.5 MG/DL (ref 0.2–1.1)
BUN BLD-MCNC: 21 MG/DL (ref 9–23)
BUN/CREAT SERPL: 15.6 (ref 10–20)
CALCIUM BLD-MCNC: 8.9 MG/DL (ref 8.7–10.4)
CHLORIDE SERPL-SCNC: 108 MMOL/L (ref 98–112)
CO2 SERPL-SCNC: 27 MMOL/L (ref 21–32)
CREAT BLD-MCNC: 1.35 MG/DL (ref 0.7–1.3)
DEPRECATED RDW RBC AUTO: 47.1 FL (ref 35.1–46.3)
EGFRCR SERPLBLD CKD-EPI 2021: 52 ML/MIN/1.73M2 (ref 60–?)
EOSINOPHIL # BLD AUTO: 0.13 X10(3) UL (ref 0–0.7)
EOSINOPHIL NFR BLD AUTO: 1.6 %
ERYTHROCYTE [DISTWIDTH] IN BLOOD BY AUTOMATED COUNT: 13 % (ref 11–15)
GLOBULIN PLAS-MCNC: 2.1 G/DL (ref 2–3.5)
GLUCOSE BLD-MCNC: 143 MG/DL (ref 70–99)
HCT VFR BLD AUTO: 46.6 % (ref 39–53)
HGB BLD-MCNC: 15.4 G/DL (ref 13–17.5)
IMM GRANULOCYTES # BLD AUTO: 0.03 X10(3) UL (ref 0–1)
IMM GRANULOCYTES NFR BLD: 0.4 %
LYMPHOCYTES # BLD AUTO: 0.79 X10(3) UL (ref 1–4)
LYMPHOCYTES NFR BLD AUTO: 9.9 %
MCH RBC QN AUTO: 32 PG (ref 26–34)
MCHC RBC AUTO-ENTMCNC: 33 G/DL (ref 31–37)
MCV RBC AUTO: 96.9 FL (ref 80–100)
MONOCYTES # BLD AUTO: 0.64 X10(3) UL (ref 0.1–1)
MONOCYTES NFR BLD AUTO: 8 %
NEUTROPHILS # BLD AUTO: 6.37 X10 (3) UL (ref 1.5–7.7)
NEUTROPHILS # BLD AUTO: 6.37 X10(3) UL (ref 1.5–7.7)
NEUTROPHILS NFR BLD AUTO: 79.5 %
NT-PROBNP SERPL-MCNC: 341 PG/ML (ref ?–450)
OSMOLALITY SERPL CALC.SUM OF ELEC: 295 MOSM/KG (ref 275–295)
PLATELET # BLD AUTO: 160 10(3)UL (ref 150–450)
POTASSIUM SERPL-SCNC: 4.1 MMOL/L (ref 3.5–5.1)
PROT SERPL-MCNC: 5.9 G/DL (ref 5.7–8.2)
RBC # BLD AUTO: 4.81 X10(6)UL (ref 3.8–5.8)
SODIUM SERPL-SCNC: 140 MMOL/L (ref 136–145)
TROPONIN I SERPL HS-MCNC: 17 NG/L (ref ?–53)
WBC # BLD AUTO: 8 X10(3) UL (ref 4–11)

## 2025-06-08 PROCEDURE — 71045 X-RAY EXAM CHEST 1 VIEW: CPT | Performed by: EMERGENCY MEDICINE

## 2025-06-08 PROCEDURE — 93970 EXTREMITY STUDY: CPT | Performed by: EMERGENCY MEDICINE

## 2025-06-08 RX ORDER — BUSPIRONE HYDROCHLORIDE 5 MG/1
5 TABLET ORAL ONCE
Status: DISCONTINUED | OUTPATIENT
Start: 2025-06-08 | End: 2025-06-09

## 2025-06-08 RX ORDER — FUROSEMIDE 10 MG/ML
40 INJECTION INTRAMUSCULAR; INTRAVENOUS ONCE
Status: COMPLETED | OUTPATIENT
Start: 2025-06-08 | End: 2025-06-08

## 2025-06-08 RX ORDER — LOPERAMIDE HYDROCHLORIDE 2 MG/1
2 CAPSULE ORAL ONCE
Status: COMPLETED | OUTPATIENT
Start: 2025-06-08 | End: 2025-06-09

## 2025-06-08 RX ORDER — MEMANTINE HYDROCHLORIDE 10 MG/1
10 TABLET ORAL ONCE
Status: COMPLETED | OUTPATIENT
Start: 2025-06-08 | End: 2025-06-09

## 2025-06-09 PROBLEM — R60.0 PERIPHERAL EDEMA: Status: ACTIVE | Noted: 2025-06-09

## 2025-06-09 PROBLEM — R01.1 HEART MURMUR: Status: ACTIVE | Noted: 2025-06-09

## 2025-06-09 LAB
ATRIAL RATE: 71 BPM
P AXIS: -23 DEGREES
P-R INTERVAL: 216 MS
Q-T INTERVAL: 398 MS
QRS DURATION: 112 MS
QTC CALCULATION (BEZET): 432 MS
R AXIS: -57 DEGREES
T AXIS: 51 DEGREES
VENTRICULAR RATE: 71 BPM

## 2025-06-09 PROCEDURE — 99222 1ST HOSP IP/OBS MODERATE 55: CPT | Performed by: HOSPITALIST

## 2025-06-09 RX ORDER — TAMSULOSIN HYDROCHLORIDE 0.4 MG/1
0.4 CAPSULE ORAL DAILY
Status: DISCONTINUED | OUTPATIENT
Start: 2025-06-09 | End: 2025-06-09

## 2025-06-09 RX ORDER — OMEPRAZOLE 20 MG/1
40 CAPSULE, DELAYED RELEASE ORAL
COMMUNITY

## 2025-06-09 RX ORDER — HALOPERIDOL 5 MG/ML
2 INJECTION INTRAMUSCULAR EVERY 6 HOURS PRN
Status: DISCONTINUED | OUTPATIENT
Start: 2025-06-09 | End: 2025-06-16

## 2025-06-09 RX ORDER — ONDANSETRON 2 MG/ML
4 INJECTION INTRAMUSCULAR; INTRAVENOUS EVERY 6 HOURS PRN
Status: DISCONTINUED | OUTPATIENT
Start: 2025-06-09 | End: 2025-06-16

## 2025-06-09 RX ORDER — MEMANTINE HYDROCHLORIDE 10 MG/1
10 TABLET ORAL 2 TIMES DAILY
Status: DISCONTINUED | OUTPATIENT
Start: 2025-06-09 | End: 2025-06-16

## 2025-06-09 RX ORDER — LANOLIN ALCOHOL/MO/W.PET/CERES
CREAM (GRAM) TOPICAL DAILY
Status: DISCONTINUED | OUTPATIENT
Start: 2025-06-09 | End: 2025-06-16

## 2025-06-09 RX ORDER — ACETAMINOPHEN 325 MG/1
650 TABLET ORAL EVERY 6 HOURS PRN
Status: DISCONTINUED | OUTPATIENT
Start: 2025-06-09 | End: 2025-06-16

## 2025-06-09 RX ORDER — SPIRONOLACTONE 25 MG/1
25 TABLET ORAL DAILY
COMMUNITY

## 2025-06-09 RX ORDER — METOPROLOL SUCCINATE 25 MG/1
25 TABLET, EXTENDED RELEASE ORAL DAILY
Status: DISCONTINUED | OUTPATIENT
Start: 2025-06-09 | End: 2025-06-09

## 2025-06-09 RX ORDER — PANTOPRAZOLE SODIUM 40 MG/1
40 TABLET, DELAYED RELEASE ORAL
Status: DISCONTINUED | OUTPATIENT
Start: 2025-06-09 | End: 2025-06-16

## 2025-06-09 RX ORDER — OXYBUTYNIN CHLORIDE 5 MG/1
5 TABLET ORAL 2 TIMES DAILY
Status: DISCONTINUED | OUTPATIENT
Start: 2025-06-09 | End: 2025-06-16

## 2025-06-09 RX ORDER — FUROSEMIDE 10 MG/ML
40 INJECTION INTRAMUSCULAR; INTRAVENOUS DAILY
Status: DISCONTINUED | OUTPATIENT
Start: 2025-06-09 | End: 2025-06-12

## 2025-06-09 RX ORDER — FERROUS SULFATE 325(65) MG
325 TABLET, DELAYED RELEASE (ENTERIC COATED) ORAL
COMMUNITY

## 2025-06-09 RX ORDER — HYDRALAZINE HYDROCHLORIDE 20 MG/ML
10 INJECTION INTRAMUSCULAR; INTRAVENOUS EVERY 4 HOURS PRN
Status: DISCONTINUED | OUTPATIENT
Start: 2025-06-09 | End: 2025-06-16

## 2025-06-09 RX ORDER — LOPERAMIDE HYDROCHLORIDE 2 MG/1
1 TABLET ORAL EVERY EVENING
COMMUNITY
End: 2025-06-16

## 2025-06-09 RX ORDER — BUSPIRONE HYDROCHLORIDE 5 MG/1
5 TABLET ORAL 2 TIMES DAILY
Status: DISCONTINUED | OUTPATIENT
Start: 2025-06-09 | End: 2025-06-16

## 2025-06-09 RX ORDER — LOPERAMIDE HYDROCHLORIDE 2 MG/1
2 TABLET ORAL DAILY
COMMUNITY
End: 2025-06-16

## 2025-06-09 RX ORDER — ROSUVASTATIN CALCIUM 20 MG/1
20 TABLET, COATED ORAL DAILY
Status: DISCONTINUED | OUTPATIENT
Start: 2025-06-09 | End: 2025-06-16

## 2025-06-09 RX ORDER — MAGNESIUM HYDROXIDE/ALUMINUM HYDROXICE/SIMETHICONE 120; 1200; 1200 MG/30ML; MG/30ML; MG/30ML
30 SUSPENSION ORAL 4 TIMES DAILY PRN
Status: DISCONTINUED | OUTPATIENT
Start: 2025-06-09 | End: 2025-06-16

## 2025-06-09 RX ORDER — ZOLPIDEM TARTRATE 5 MG/1
5 TABLET ORAL NIGHTLY PRN
Status: DISCONTINUED | OUTPATIENT
Start: 2025-06-09 | End: 2025-06-16

## 2025-06-09 NOTE — H&P
[facility]  part of [facility]    History & Physical    Prashanth Caceres Patient Status:  Emergency    10/21/1940 MRN [id number]   Location [facility] EMERGENCY DEPARTMENT Attending Bina Washington MD   Hosp Day # 0 PCP TOMMIE HOLLIS MD     Date:  2025  Date of Admission:  2025    Chief Complaint:  Chief Complaint   Patient presents with    Hypertension       History of Present Illness:  Prashanth Caceres is a(n) 84 year old male, with a past medical history significant for diastolic heart failure, PE, bipolar mood disorder presents with complaint of increasing lower extremity swelling over the last few days.  Patient claims he was told by his cardiologist to stop taking Lasix and was started on spironolactone however note clearly indicates Lasix dose was to be increased.  Denies any chest pain shortness of breath orthopnea or PND.    History:  Past Medical History[1]  Past Surgical History[2]  Family History[3]   reports that he has quit smoking. His smoking use included cigars and cigarettes. He has a 5 pack-year smoking history. He uses smokeless tobacco. He reports current alcohol use. He reports that he does not use drugs.    Allergies:  Allergies[4]    Home Medications:  Prior to Admission Medications   Prescriptions Last Dose Informant Patient Reported? Taking?   DULoxetine (CYMBALTA) 30 MG Oral Cap DR Particles   No No   Sig: Take one capsule with duloxetine 60mg for a total dose of 90mg daily   DULoxetine (CYMBALTA) 60 MG Oral Cap DR Particles   No No   Sig: Take 1 capsule (60 mg total) by mouth daily. Take a total of 90 mg per day   MYRBETRIQ 50 MG Oral Tablet 24 Hr   Yes No   Sig: Take 1 tablet (50 mg total) by mouth daily.   Mesalamine 4 g Rectal Enema   No No   Sig: Use Monday, Wednesday, Friday night.   Tolterodine Tartrate 2 MG Oral Tab   Yes No   Sig: Take 2 tablets (4 mg total) by mouth daily.   acetaminophen 500 MG Oral Tab   Yes No   Sig: Take 1 tablet (500 mg  total) by mouth every 6 (six) hours as needed for Fever (equal to or greater than 100.4).   bumetanide 1 MG Oral Tab   No No   Sig: Take 1 tablet (1 mg total) by mouth every other day. To take 0.5 mg by mouth on days not taking 1 mg by mouth   busPIRone 5 MG Oral Tab   No No   Sig: Take 1 tablet (5 mg total) by mouth 2 (two) times daily.   diphenhydrAMINE HCl 50 MG Oral Tab   No No   Sig: Take one tablet (50mg total) by mouth one hour prior to procedure.   memantine 10 MG Oral Tab   Yes No   Sig: Take 1 tablet (10 mg total) by mouth 2 (two) times daily.   methylphenidate 10 MG Oral Tab   No No   Sig: Take 0.5 tablets (5 mg total) by mouth daily.   metoprolol succinate 25 MG Oral Tablet 24 Hr   No No   Sig: Take 1 tablet (25 mg total) by mouth daily. Take one tablet (25mg total) by mouth daily   potassium chloride 20 MEQ Oral Tab CR   Yes No   Sig: Take 20 mEq by mouth daily.   rosuvastatin 20 MG Oral Tab   No No   Sig: Take 1 tablet (20 mg total) by mouth daily.   tamsulosin HCl 0.4 MG Oral Cap   Yes No   Sig: Take 1 capsule (0.4 mg total) by mouth daily.      Facility-Administered Medications: None       Review of Systems:  Constitutional:  Weakness, Fatigue.  Eye:  Negative.  Ear/Nose/Mouth/Throat:  Negative.  Respiratory:  Negative  Cardiovascular: Increased lower extremity swelling  Gastrointestinal:  Negative.  Genitourinary:  Negative  Endocrine:  Negative.  Immunologic:  Negative.  Musculoskeletal:  Negative.  Integumentary:  Negative.  Neurologic:  Negative.  Psychiatric:  Negative.  ROS reviewed as documented in chart    Physical Exam:  Temp:  [98.8 °F (37.1 °C)] 98.8 °F (37.1 °C)  Pulse:  [67-74] 67  Resp:  [18-20] 18  BP: (154-180)/(81-82) 154/81  SpO2:  [94 %-98 %] 98 %    General:  Alert and oriented.  Diffuse skin problem:  None.  Eye:  Pupils are equal, round and reactive to light, extraocular movements are intact, Normal conjunctiva.  HENT:  Normocephalic, oral mucosa is moist.  Head:  Normocephalic,  atraumatic.  Neck:  Supple, non-tender, no carotid bruit, no jugular venous distention, no lymphadenopathy, no thyromegaly.  Respiratory:  Lungs are clear to auscultation, respirations are non-labored, breath sounds are equal, symmetrical chest wall expansion.  Cardiovascular:  Normal rate, regular rhythm, no murmur, 2+ bilateral lower extremity pitting edema left greater than right  Gastrointestinal:  Soft, non-tender, non-distended, normal bowel sounds, no organomegaly.  Lymphatics:  No lymphadenopathy neck, axilla, groin.  Musculoskeletal: Normal range of motion.  normal strength.  Feet:  Normal pulses.  Neurologic:  Alert, oriented, no focal deficits, cranial nerves II-XII are grossly intact.  Cognition and Speech:  Oriented, speech clear and coherent.  Psychiatric:  Cooperative, appropriate mood & affect.      Laboratory Data:   Lab Results   Component Value Date    WBC 8.0 06/08/2025    HGB 15.4 06/08/2025    HCT 46.6 06/08/2025    .0 06/08/2025    CREATSERUM 1.35 06/08/2025    BUN 21 06/08/2025     06/08/2025    K 4.1 06/08/2025     06/08/2025    CO2 27.0 06/08/2025     06/08/2025    CA 8.9 06/08/2025    ALB 3.8 06/08/2025    ALKPHO 115 06/08/2025    BILT 0.5 06/08/2025    TP 5.9 06/08/2025    AST 23 06/08/2025    ALT 21 06/08/2025       Imaging:  No results found.     Assessment and Plan:    Acute on chronic diastolic heart failure  Secondary to poor compliance with diuretics, will start patient on Lasix 40 mg IV daily, monitor I's and O's and daily weights.    CKD stage II  Avoid nephrotoxic medications, will monitor as needed.    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  Will  patient regarding lifestyle modifications, consider CPAP at night.    Prophylaxis  Subcutaneous heparin    CODE STATUS  Full    Primary care physician  TOMMIE HOLLIS MD    60  minutes spent on this admission - examining patient, obtaining history, reviewing previous medical records, going over test results/imaging and discussing plan of care. All questions answered.     Disposition  Clinical course will dictate outcome      BEATRICE ZENDEJAS MD  6/9/2025  12:38 AM         [1]   Past Medical History:   Bipolar affective (HCC)    Cancer (HCC)    Depression    Diverticulitis    Diverticulosis of large intestine    Essential hypertension    High blood pressure    High cholesterol    ESTHER (obstructive sleep apnea)    AHI 28 RDI 28 REM AHI 22 Supine AHI 0 non-supine AHI 29 Sao2 Ugo 73%     Osteoarthritis    Pulmonary embolism (HCC)    Sleep apnea    Visual impairment    1 contact in left eye   [2]   Past Surgical History:  Procedure Laterality Date    Appendectomy      15-20 years ago.    Colonoscopy N/A 8/28/2017    Procedure: COLONOSCOPY, POSSIBLE BIOPSY, POSSIBLE POLYPECTOMY 11197;  Surgeon: Elver Ellis MD;  Location: [facility]    Colonoscopy N/A 9/24/2019    Procedure: COLONOSCOPY, POSSIBLE BIOPSY, POSSIBLE POLYPECTOMY 02916;  Surgeon: Marciano Tyler MD;  Location: [facility]    Other      Carpal tunnel surgery   [3]   Family History  Problem Relation Age of Onset    Heart Disorder Mother     Other (Other[other]) Mother     Heart Disorder Daughter    [4]   Allergies  Allergen Reactions    Cat Hair Extract OTHER (SEE COMMENTS)    Radiology Contrast Iodinated Dyes OTHER (SEE COMMENTS)    Other ITCHING     Cat Dander

## 2025-06-09 NOTE — CONSULTS
[facility]  Bayron oFster MD  ?  Patient: Prashanth Caceres  MRN: [id number]     Primary Care Physician: Dr. TOMMIE HOLLIS MD  Referring Physician : Andrew Gould MD  Reason for Consultation: HTN, AF with prior CVA     HPI:  Prashanth Caceres is an 84-year-old gentleman with a history of vascular dementia, AF with prior CVA 2019, depression, and prostate CA s/p RT.    Vital signs and admission show blood pressure 180/82 with a heart rate of 74.  Saturations are stable on room air.  ECG showed sinus rhythm with rate of 81.  No acute ischemic changes were noted.  Chest x-ray did not show evidence for pneumonia or pulmonary congestion.  Labs showed normal CBC and electrolytes.  Creatinine was 1.35.  Troponin was 17 and .     this afternoon, the patient is accompanied by his wife and son at bedside.  His wife has been quite involved in his care and states that he has been following with Dr. Cowan with recent Lasix/potassium supplementation transitioned over to spironolactone 25 daily.  Since, he did notice increased swelling in his legs as well as elevated readings of blood pressure.  He denies any recent episodes of chest discomfort or shortness breath.  No palpitations, lightness, or syncope is reported.,  He states that he feels somewhat weak and fatigued however no additional complaints are reported.        Assessment/plan    Prashanth Caceres is an 84-year-old gentleman with a history of vascular dementia, AF with prior CVA 2019, depression, and prostate CA s/p RT.    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  Continue Lasix 40 IV daily  2.  Daily BMP  3.  Increase Eliquis to 5 twice daily (weight greater than 60 kg and recent creatinine 1.35)      Thank you for allowing me to participate in the care of your patient.    Bayron Foster MD  [facility]    -----------------------------------------------------------------------------      Past Medical History[1]  Principal Problem:    Peripheral edema  Active Problems:    Heart murmur          Medications:  .Medications Ordered Prior to Encounter[2]  Allergies: Allergies[3]  Social History     Socioeconomic History    Marital status:      Spouse name: Not on file    Number of children: Not on file    Years of education: Not on file    Highest education level: Not on file   Occupational History    Not on file   Tobacco Use    Smoking status: Former     Current packs/day: 0.50     Average packs/day: 0.5 packs/day for 10.0 years (5.0 ttl pk-yrs)     Types: Cigars, Cigarettes    Smokeless tobacco: Current   Vaping Use    Vaping status: Never Used   Substance and Sexual Activity    Alcohol use: Yes     Alcohol/week: 0.0 standard drinks of alcohol     Comment: Weekends    Drug use: No    Sexual activity: Not on file   Other Topics Concern    Not on file   Social History Narrative    Not on file     Social Drivers of Health     Food Insecurity: No Food Insecurity (6/9/2025)    NCSS - Food Insecurity     Worried About Running Out of Food in the Last Year: No     Ran Out of Food in the Last Year: No   Transportation Needs: No Transportation Needs (6/9/2025)    NCSS - Transportation     Lack of Transportation: No   Housing Stability: Not At Risk (6/9/2025)    NCSS - Housing/Utilities     Has Housing: Yes     Worried About Losing Housing: No     Unable to Get Utilities: No     Family History[4]  SH and FH were reviewed and  updated.     Review of Systems  Constitutional: negative for change in appetite, chills, fevers, sweats (diaphoresis), weight loss or weight gain     Eyes: negative for irritation, redness and visual disturbance     Ears, nose, mouth, throat, and face: negative for hearing loss and sinus problems; negative for dental problems, negative for epistaxis, nasal congestion and sore throat     Respiratory: negative for cough, hemoptysis, pleurisy/chest pain, sputum and wheezing; negative for dyspnea upon exertion     Cardiovascular: See HPI     Gastrointestinal: negative for vomiting, nausea, heartburn(sensation) and abdominal bloating. Negative for diarrhea, constipation or melena     Genitourinary:negative for dysuria and hematuria     Integumentary: negative for rash and skin lesion(s)     Hematologic/lymphatic: negative for bleeding,easy bruising and lymphadenopathy     Musculoskeletal:negative for back pain, myalgias and muscle cramps/weakness       Behavioral/Psych: negative for anxiety and depression     Endocrine: negative for diabetic symptoms including blurry vision, increased fatigue, polydipsia and polyuria and temperature intolerance     Allergic/Immunologic: negative for anaphylaxis, hay fever and pruritus     Physical Exam:  Blood pressure 152/89, pulse 70, temperature 97.7 °F (36.5 °C), temperature source Oral, resp. rate 20, height 5' 8\" (1.727 m), weight 271 lb 6.4 oz (123.1 kg), SpO2 92%.  ?  Alert and Oriented times x 3, pleasant, no distress, conversant, appears stated age  No JVD or No carotid bruits  Lungs are CTAB, no wheezes or crackles, normal respiratory effort  Heart exam is regular, no murmurs or S3, PMI is non-displaced  Abdomen is soft, nontender, bowel sounds are present, no HSM, no bruits  Lower extremities without edema, no clubbing  1+ pedal and femoral pulses bilaterally  Normal skin turgor, texture, no rashes or lesions?    LABS:  .  Lab Results   Component Value Date    WBC 8.0  06/08/2025    HGB 15.4 06/08/2025    HCT 46.6 06/08/2025    MCV 96.9 06/08/2025    .0 06/08/2025     .  Lab Results   Component Value Date    BUN 21 06/08/2025     06/08/2025    K 4.1 06/08/2025     06/08/2025    CO2 27.0 06/08/2025     .No results found for: \"INR\", \"PROTIME\"  .No components found for: \"CHLPL\"  Lab Results   Component Value Date    HDL 60 (H) 05/14/2025    HDL 58 11/02/2021    HDL 58 11/04/2020     Lab Results   Component Value Date    LDL 80 05/14/2025    LDL 67 11/02/2021    LDL 73 11/04/2020     Lab Results   Component Value Date    TRIG 85 05/14/2025    TRIG 95 11/02/2021    TRIG 88 11/04/2020     No results found for: \"CHOLHDL\"  .  Lab Results   Component Value Date    ALT 21 06/08/2025    AST 23 06/08/2025     .  Lab Results   Component Value Date    TSH 1.130 05/10/2019          Thank you for allowing me to participate in the care of your patient.    Bayron Foster MD  [facility]    -----------------------------------------------------------------------------         [1]   Past Medical History:   Bipolar affective (HCC)    Cancer (HCC)    Depression    Diverticulitis    Diverticulosis of large intestine    Essential hypertension    High blood pressure    High cholesterol    ESTHER (obstructive sleep apnea)    AHI 28 RDI 28 REM AHI 22 Supine AHI 0 non-supine AHI 29 Sao2 Ugo 73%     Osteoarthritis    Pulmonary embolism (HCC)    Sleep apnea    Visual impairment    1 contact in left eye   [2]   No current facility-administered medications on file prior to encounter.     Current Outpatient Medications on File Prior to Encounter   Medication Sig Dispense Refill    ferrous sulfate 325 (65 FE) MG Oral Tab EC Take 1 tablet (325 mg total) by mouth daily with breakfast.      omeprazole 20 MG Oral Capsule Delayed Release Take 2 capsules (40 mg total) by mouth every morning before breakfast.      spironolactone 25 MG Oral Tab Take 1 tablet (25 mg total) by mouth daily.       apixaban 2.5 MG Oral Tab Take 1 tablet (2.5 mg total) by mouth 2 (two) times daily.      Loperamide HCl 2 MG Oral Tab Take 1 tablet (2 mg total) by mouth daily. Patient takes 2 mg in and 1 mg (1/2 tab) at night      Loperamide HCl 2 MG Oral Tab Take 0.5 tablets (1 mg total) by mouth every evening. Patient takes 2 mg in and 1 mg (1/2 tab) at night      melatonin 3 MG Oral Tab Take 0.5 tablets (1.5 mg total) by mouth nightly.      memantine 10 MG Oral Tab Take 1 tablet (10 mg total) by mouth in the morning and 1 tablet (10 mg total) before bedtime.      DULoxetine (CYMBALTA) 30 MG Oral Cap DR Particles Take one capsule with duloxetine 60mg for a total dose of 90mg daily 90 capsule 1    DULoxetine (CYMBALTA) 60 MG Oral Cap DR Particles Take 1 capsule (60 mg total) by mouth daily. Take a total of 90 mg per day 90 capsule 1    busPIRone 5 MG Oral Tab Take 1 tablet (5 mg total) by mouth 2 (two) times daily. 180 tablet 1    MYRBETRIQ 50 MG Oral Tablet 24 Hr Take 1 tablet (50 mg total) by mouth in the morning.      rosuvastatin 20 MG Oral Tab Take 1 tablet (20 mg total) by mouth daily. 90 tablet 3    Tolterodine Tartrate 2 MG Oral Tab Take 2 tablets (4 mg total) by mouth in the morning.      methylphenidate 10 MG Oral Tab Take 0.5 tablets (5 mg total) by mouth daily. 30 tablet 0    acetaminophen 500 MG Oral Tab Take 1 tablet (500 mg total) by mouth every 6 (six) hours as needed for Fever (equal to or greater than 100.4). 1 tablet 0    bumetanide 1 MG Oral Tab Take 1 tablet (1 mg total) by mouth every other day. To take 0.5 mg by mouth on days not taking 1 mg by mouth 135 tablet 1    metoprolol succinate 25 MG Oral Tablet 24 Hr Take 1 tablet (25 mg total) by mouth daily. Take one tablet (25mg total) by mouth daily 90 tablet 1    Mesalamine 4 g Rectal Enema Use Monday, Wednesday, Friday night. 12 enema 11    tamsulosin HCl 0.4 MG Oral Cap Take 1 capsule (0.4 mg total) by mouth in the morning.     [3]    Allergies  Allergen Reactions    Cat Hair Extract OTHER (SEE COMMENTS)    Radiology Contrast Iodinated Dyes OTHER (SEE COMMENTS)    Other ITCHING     Cat Dander   [4]   Family History  Problem Relation Age of Onset    Heart Disorder Mother     Other (Other[other]) Mother     Heart Disorder Daughter

## 2025-06-09 NOTE — ED INITIAL ASSESSMENT (HPI)
Patient to the ED for complaint of HTN. Pt states he was at a restaurant for dinner and just wasn't feeling himself. Said the doctor recently took him of lasix and another medication he can't remember and he isn't sure if that is the issue. Pt has bilateral swelling to both legs but left one more than normal. Pt is AO 4

## 2025-06-09 NOTE — ED QUICK NOTES
Patient's wife requesting patient receive his bedtime medications. OK to order per Dr. Washington: Eliquis 2.5mg, buspirone 5mg, immodium 2mg, memantine 10mg, melatonin 1.5mg.

## 2025-06-09 NOTE — ED QUICK NOTES
Orders for admission, patient is aware of plan and ready to go upstairs. Any questions, please call ED RN Mikayla at extension [phone].     Patient Covid vaccination status: Partially vaccinated     COVID Test Ordered in ED: None    COVID Suspicion at Admission: N/A    Running Infusions: Medication Infusions[1] None    Mental Status/LOC at time of transport: AOx3    Other pertinent information:   CIWA score: N/A   NIH score:  N/A               [1]

## 2025-06-09 NOTE — PROGRESS NOTES
Pt seen and examined.  Pt admitted and H and P done by my partner earlier this am.    Family with multiple questions about meds which I went over with them.    Await cardiology consult for recs on diuretics and BP meds.   Family with questions about these.    IV ancef stated for mild LLE cellulitis.    Compression stockings ordered.    Eucerin to legs ordered.    Andrew Brown MD  6/9/2025

## 2025-06-09 NOTE — ED PROVIDER NOTES
Patient Seen in: [facility] Emergency Department        History  Chief Complaint   Patient presents with    Hypertension     Stated Complaint: HTN    Subjective:   HPI            Pt is 83 yo M who arrives by EMS with elevated BP today. Pt had lasix stopped last week and added spirinolactone by cardiology. Has had increased swelling L>R legs since then. Denies chest pain, sob, headaches, blurry vision or dizziness. Became shaky during dinner today and didn't feel well. BP as high as 180s tonight at home.       Objective:     Past Medical History:    Bipolar affective (HCC)    Cancer (HCC)    Depression    Diverticulitis    Diverticulosis of large intestine    Essential hypertension    High blood pressure    High cholesterol    ESTHER (obstructive sleep apnea)    AHI 28 RDI 28 REM AHI 22 Supine AHI 0 non-supine AHI 29 Sao2 Ugo 73%     Osteoarthritis    Pulmonary embolism (HCC)    Sleep apnea    Visual impairment    1 contact in left eye              Past Surgical History:   Procedure Laterality Date    Appendectomy      15-20 years ago.    Colonoscopy N/A 8/28/2017    Procedure: COLONOSCOPY, POSSIBLE BIOPSY, POSSIBLE POLYPECTOMY 87844;  Surgeon: Elver Ellis MD;  Location: [facility]    Colonoscopy N/A 9/24/2019    Procedure: COLONOSCOPY, POSSIBLE BIOPSY, POSSIBLE POLYPECTOMY 32580;  Surgeon: Marciano Tyler MD;  Location: [facility]    Other      Carpal tunnel surgery                Social History     Socioeconomic History    Marital status:    Tobacco Use    Smoking status: Former     Current packs/day: 0.50     Average packs/day: 0.5 packs/day for 10.0 years (5.0 ttl pk-yrs)     Types: Cigars, Cigarettes    Smokeless tobacco: Current   Vaping Use    Vaping status: Never Used   Substance and Sexual Activity    Alcohol use: Yes     Alcohol/week: 0.0 standard drinks of alcohol     Comment: Weekends    Drug use: No                                Physical Exam    ED  Triage Vitals [06/08/25 2112]   BP (!) 180/82   Pulse 74   Resp 20   Temp 98.8 °F (37.1 °C)   Temp src Oral   SpO2 94 %   O2 Device None (Room air)       Current Vitals:   Vital Signs  BP: 154/81  Pulse: 67  Resp: 18  Temp: 98.8 °F (37.1 °C)  Temp src: Oral    Oxygen Therapy  SpO2: 98 %  O2 Device: None (Room air)            Physical Exam  GENERAL: No acute distress, awake and alert  HEENT: EOMI, PERRL  Neck: supple, no jvd  CV: RRR, +holosystolic murmur  Resp: CTAB, no wheezes or retractions  Extremities: 2-3+ pitting edema BLE  Neuro: CN intact, normal speech, 5/5 motor strength in all extremities, no focal deficits  SKIN: warm, dry, no rashes            ED Course  Labs Reviewed   CBC WITH DIFFERENTIAL WITH PLATELET - Abnormal; Notable for the following components:       Result Value    RDW-SD 47.1 (*)     Lymphocyte Absolute 0.79 (*)     All other components within normal limits   COMP METABOLIC PANEL (14) - Abnormal; Notable for the following components:    Glucose 143 (*)     Creatinine 1.35 (*)     eGFR-Cr 52 (*)     All other components within normal limits   TROPONIN I HIGH SENSITIVITY - Normal   PRO BETA NATRIURETIC PEPTIDE - Normal   RAINBOW DRAW LAVENDER   RAINBOW DRAW LIGHT GREEN     EKG    Rate, intervals and axes as noted on EKG Report.  Rate: 71  Rhythm: Sinus Rhythm  Reading: no MINI, normal EKG                    [facility]     Medical Decision Making  Pt with LE edema, worsening since holding lasix  Bp improved in ED after dose IV lasix  ?new heart murmur on exam-pt had recent echo with mild-moderate TR  D/w patient and wife observational stay and they are agreeable to plan for further diruesis    Amount and/or Complexity of Data Reviewed  External Data Reviewed: labs, radiology and notes.     Details: Labs 5/2025 reviewed  Cardiology notes 6/2025  Echo 5/2025 reviewed  Labs: ordered.  Radiology: ordered.     Details: BILATERAL LOWER EXTREMITY DUPLEX ULTRASOUND    IMPRESSION:  No evidence of DVT in the  visualized bilateral lower extremity veins.  Left calf veins not visualized due to edema      Report finalized on 06/09/25 at 12:52 AM ET.      Dr. Aura Bean MD    CHEST RADIOGRAPH(S)    COMPARISON: 12/18/2021      IMPRESSION:  Left lower lung haziness, likely due to uneven elevation of the hemidiaphragm and/or prominent cardiophrenic fat, overall similar to prior.  No focal consolidation seen.  No pneumothorax or pleural effusion.  Grossly stable cardiomediastinal silhouette.  No acute osseous abnormality.      Report finalized on 06/09/25 at 1:14 AM ET.      Dr. Aura Bean MD      Discussion of management or test interpretation with external provider(s): D/w dr Blas    Risk  Decision regarding hospitalization.        Disposition and Plan     Clinical Impression:  1. Peripheral edema    2. Heart murmur         Disposition:  Admit  6/9/2025 12:28 am    Follow-up:  No follow-up provider specified.  We recommend that you schedule follow up care with a primary care provider within the next three months to obtain basic health screening including reassessment of your blood pressure.      Medications Prescribed:  Current Discharge Medication List                Supplementary Documentation:         Hospital Problems       Present on Admission  Date Reviewed: 2/23/2025          ICD-10-CM Noted POA    * (Principal) Peripheral edema R60.0 6/9/2025 Unknown

## 2025-06-09 NOTE — PLAN OF CARE
Problem: Patient Centered Care  Goal: Patient preferences are identified and integrated in the patient's plan of care  Description: Interventions:  - What would you like us to know as we care for you? Per wife, patient is not allowed dairy as per GI MD  - Provide timely, complete, and accurate information to patient/family  - Incorporate patient and family knowledge, values, beliefs, and cultural backgrounds into the planning and delivery of care  - Encourage patient/family to participate in care and decision-making at the level they choose  - Honor patient and family perspectives and choices  Outcome: Progressing     Problem: Patient/Family Goals  Goal: Patient/Family Long Term Goal  Description: Patient's Long Term Goal: ensure medications work well and don't need to be changed by MD    Interventions:  - monitor new medications and give education  - ensure patient and wife understand importance of diet and fluid intake  - See additional Care Plan goals for specific interventions  Outcome: Progressing  Goal: Patient/Family Short Term Goal  Description: Patient's Short Term Goal: get rid of leg swelling    Interventions:   - monitor labs  - monitor intake and output  - diuretic medication as per MD order  - See additional Care Plan goals for specific interventions  Outcome: Progressing     Problem: CARDIOVASCULAR - ADULT  Goal: Maintains optimal cardiac output and hemodynamic stability  Description: INTERVENTIONS:  - Monitor vital signs, rhythm, and trends  - Monitor for bleeding, hypotension and signs of decreased cardiac output  - Evaluate effectiveness of vasoactive medications to optimize hemodynamic stability  - Monitor arterial and/or venous puncture sites for bleeding and/or hematoma  - Assess quality of pulses, skin color and temperature  - Assess for signs of decreased coronary artery perfusion - ex. Angina  - Evaluate fluid balance, assess for edema, trend weights  Outcome: Progressing  Goal: Absence of  cardiac arrhythmias or at baseline  Description: INTERVENTIONS:  - Continuous cardiac monitoring, monitor vital signs, obtain 12 lead EKG if indicated  - Evaluate effectiveness of antiarrhythmic and heart rate control medications as ordered  - Initiate emergency measures for life threatening arrhythmias  - Monitor electrolytes and administer replacement therapy as ordered  Outcome: Progressing     Problem: RESPIRATORY - ADULT  Goal: Achieves optimal ventilation and oxygenation  Description: INTERVENTIONS:  - Assess for changes in respiratory status  - Assess for changes in mentation and behavior  - Position to facilitate oxygenation and minimize respiratory effort  - Oxygen supplementation based on oxygen saturation or ABGs  - Provide Smoking Cessation handout, if applicable  - Encourage broncho-pulmonary hygiene including cough, deep breathe, Incentive Spirometry  - Assess the need for suctioning and perform as needed  - Assess and instruct to report SOB or any respiratory difficulty  - Respiratory Therapy support as indicated  - Manage/alleviate anxiety  - Monitor for signs/symptoms of CO2 retention  Outcome: Progressing     Problem: METABOLIC/FLUID AND ELECTROLYTES - ADULT  Goal: Electrolytes maintained within normal limits  Description: INTERVENTIONS:  - Monitor labs and rhythm and assess patient for signs and symptoms of electrolyte imbalances  - Administer electrolyte replacement as ordered  - Monitor response to electrolyte replacements, including rhythm and repeat lab results as appropriate  - Fluid restriction as ordered  - Instruct patient on fluid and nutrition restrictions as appropriate  Outcome: Progressing  Goal: Hemodynamic stability and optimal renal function maintained  Description: INTERVENTIONS:  - Monitor labs and assess for signs and symptoms of volume excess or deficit  - Monitor intake, output and patient weight  - Monitor urine specific gravity, serum osmolarity and serum sodium as indicated  or ordered  - Monitor response to interventions for patient's volume status, including labs, urine output, blood pressure (other measures as available)  - Encourage oral intake as appropriate  - Instruct patient on fluid and nutrition restrictions as appropriate  Outcome: Progressing

## 2025-06-10 LAB
ANION GAP SERPL CALC-SCNC: 11 MMOL/L (ref 0–18)
BASOPHILS # BLD AUTO: 0.05 X10(3) UL (ref 0–0.2)
BASOPHILS NFR BLD AUTO: 0.7 %
BUN BLD-MCNC: 22 MG/DL (ref 9–23)
BUN/CREAT SERPL: 14.8 (ref 10–20)
CALCIUM BLD-MCNC: 9.1 MG/DL (ref 8.7–10.4)
CHLORIDE SERPL-SCNC: 103 MMOL/L (ref 98–112)
CO2 SERPL-SCNC: 28 MMOL/L (ref 21–32)
CREAT BLD-MCNC: 1.49 MG/DL (ref 0.7–1.3)
DEPRECATED RDW RBC AUTO: 44.9 FL (ref 35.1–46.3)
EGFRCR SERPLBLD CKD-EPI 2021: 47 ML/MIN/1.73M2 (ref 60–?)
EOSINOPHIL # BLD AUTO: 0.22 X10(3) UL (ref 0–0.7)
EOSINOPHIL NFR BLD AUTO: 3.1 %
ERYTHROCYTE [DISTWIDTH] IN BLOOD BY AUTOMATED COUNT: 13 % (ref 11–15)
GLUCOSE BLD-MCNC: 80 MG/DL (ref 70–99)
HCT VFR BLD AUTO: 48.9 % (ref 39–53)
HGB BLD-MCNC: 16.2 G/DL (ref 13–17.5)
IMM GRANULOCYTES # BLD AUTO: 0.02 X10(3) UL (ref 0–1)
IMM GRANULOCYTES NFR BLD: 0.3 %
LYMPHOCYTES # BLD AUTO: 1.09 X10(3) UL (ref 1–4)
LYMPHOCYTES NFR BLD AUTO: 15.5 %
MCH RBC QN AUTO: 31.1 PG (ref 26–34)
MCHC RBC AUTO-ENTMCNC: 33.1 G/DL (ref 31–37)
MCV RBC AUTO: 93.9 FL (ref 80–100)
MONOCYTES # BLD AUTO: 1.05 X10(3) UL (ref 0.1–1)
MONOCYTES NFR BLD AUTO: 14.9 %
NEUTROPHILS # BLD AUTO: 4.6 X10 (3) UL (ref 1.5–7.7)
NEUTROPHILS # BLD AUTO: 4.6 X10(3) UL (ref 1.5–7.7)
NEUTROPHILS NFR BLD AUTO: 65.5 %
OSMOLALITY SERPL CALC.SUM OF ELEC: 296 MOSM/KG (ref 275–295)
PLATELET # BLD AUTO: 158 10(3)UL (ref 150–450)
POTASSIUM SERPL-SCNC: 3.4 MMOL/L (ref 3.5–5.1)
RBC # BLD AUTO: 5.21 X10(6)UL (ref 3.8–5.8)
SODIUM SERPL-SCNC: 142 MMOL/L (ref 136–145)
WBC # BLD AUTO: 7 X10(3) UL (ref 4–11)

## 2025-06-10 PROCEDURE — 99233 SBSQ HOSP IP/OBS HIGH 50: CPT | Performed by: HOSPITALIST

## 2025-06-10 RX ORDER — POTASSIUM CHLORIDE 1500 MG/1
40 TABLET, EXTENDED RELEASE ORAL ONCE
Status: COMPLETED | OUTPATIENT
Start: 2025-06-10 | End: 2025-06-10

## 2025-06-10 RX ORDER — SPIRONOLACTONE 25 MG/1
25 TABLET ORAL DAILY
Status: DISCONTINUED | OUTPATIENT
Start: 2025-06-11 | End: 2025-06-16

## 2025-06-10 RX ORDER — METOPROLOL SUCCINATE 25 MG/1
25 TABLET, EXTENDED RELEASE ORAL
Status: DISCONTINUED | OUTPATIENT
Start: 2025-06-11 | End: 2025-06-16

## 2025-06-10 NOTE — PLAN OF CARE
Patients family brought in home cpap for Prashanth. This RN assisted him with mask prior to sleep.     Problem: Patient Centered Care  Goal: Patient preferences are identified and integrated in the patient's plan of care  Description: Interventions:  - What would you like us to know as we care for you? Per wife, patient is not allowed dairy as per GI MD  - Provide timely, complete, and accurate information to patient/family  - Incorporate patient and family knowledge, values, beliefs, and cultural backgrounds into the planning and delivery of care  - Encourage patient/family to participate in care and decision-making at the level they choose  - Honor patient and family perspectives and choices  Outcome: Progressing     Problem: Patient/Family Goals  Goal: Patient/Family Long Term Goal  Description: Patient's Long Term Goal: ensure medications work well and don't need to be changed by MD    Interventions:  - monitor new medications and give education  - ensure patient and wife understand importance of diet and fluid intake  - See additional Care Plan goals for specific interventions  Outcome: Progressing  Goal: Patient/Family Short Term Goal  Description: Patient's Short Term Goal: get rid of leg swelling    Interventions:   - monitor labs  - monitor intake and output  - diuretic medication as per MD order  - See additional Care Plan goals for specific interventions  Outcome: Progressing     Problem: CARDIOVASCULAR - ADULT  Goal: Maintains optimal cardiac output and hemodynamic stability  Description: INTERVENTIONS:  - Monitor vital signs, rhythm, and trends  - Monitor for bleeding, hypotension and signs of decreased cardiac output  - Evaluate effectiveness of vasoactive medications to optimize hemodynamic stability  - Monitor arterial and/or venous puncture sites for bleeding and/or hematoma  - Assess quality of pulses, skin color and temperature  - Assess for signs of decreased coronary artery perfusion - ex. Angina  -  Evaluate fluid balance, assess for edema, trend weights  Outcome: Progressing  Goal: Absence of cardiac arrhythmias or at baseline  Description: INTERVENTIONS:  - Continuous cardiac monitoring, monitor vital signs, obtain 12 lead EKG if indicated  - Evaluate effectiveness of antiarrhythmic and heart rate control medications as ordered  - Initiate emergency measures for life threatening arrhythmias  - Monitor electrolytes and administer replacement therapy as ordered  Outcome: Progressing     Problem: RESPIRATORY - ADULT  Goal: Achieves optimal ventilation and oxygenation  Description: INTERVENTIONS:  - Assess for changes in respiratory status  - Assess for changes in mentation and behavior  - Position to facilitate oxygenation and minimize respiratory effort  - Oxygen supplementation based on oxygen saturation or ABGs  - Provide Smoking Cessation handout, if applicable  - Encourage broncho-pulmonary hygiene including cough, deep breathe, Incentive Spirometry  - Assess the need for suctioning and perform as needed  - Assess and instruct to report SOB or any respiratory difficulty  - Respiratory Therapy support as indicated  - Manage/alleviate anxiety  - Monitor for signs/symptoms of CO2 retention  Outcome: Progressing     Problem: METABOLIC/FLUID AND ELECTROLYTES - ADULT  Goal: Electrolytes maintained within normal limits  Description: INTERVENTIONS:  - Monitor labs and rhythm and assess patient for signs and symptoms of electrolyte imbalances  - Administer electrolyte replacement as ordered  - Monitor response to electrolyte replacements, including rhythm and repeat lab results as appropriate  - Fluid restriction as ordered  - Instruct patient on fluid and nutrition restrictions as appropriate  Outcome: Progressing  Goal: Hemodynamic stability and optimal renal function maintained  Description: INTERVENTIONS:  - Monitor labs and assess for signs and symptoms of volume excess or deficit  - Monitor intake, output and  patient weight  - Monitor urine specific gravity, serum osmolarity and serum sodium as indicated or ordered  - Monitor response to interventions for patient's volume status, including labs, urine output, blood pressure (other measures as available)  - Encourage oral intake as appropriate  - Instruct patient on fluid and nutrition restrictions as appropriate  Outcome: Progressing

## 2025-06-10 NOTE — PROGRESS NOTES
[facility]  part of [facility]    Progress Note    Prashanth Caceres Patient Status:  Observation    10/21/1940 MRN [id number]   Location [facility] 3W/SW Attending Andrew France MD   Hosp Day # 0 PCP TOMMIE HOLLIS MD       Subjective:     Legs less swollen.  No SOB.    Objective:   Blood pressure 135/80, pulse 78, temperature 97.8 °F (36.6 °C), temperature source Oral, resp. rate 20, height 68\", weight 262 lb 1.6 oz (118.9 kg), SpO2 94%.    Gen:   NAD.  A and O x 3  CV:   RRR, no m/g/r  Pulm:   CTA bilat  Abd:   +bs, soft, NT, ND  LE:   venous stasis changes bilat.  1-2+ edema bilat.  3x5 cm warm pinkish area of LLE is less intense red.  Neuro:   nonfocal    Results:     Lab Results   Component Value Date    WBC 7.0 06/10/2025    HGB 16.2 06/10/2025    HCT 48.9 06/10/2025    .0 06/10/2025    CREATSERUM 1.49 (H) 06/10/2025    BUN 22 06/10/2025     06/10/2025    K 3.4 (L) 06/10/2025     06/10/2025    CO2 28.0 06/10/2025    GLU 80 06/10/2025    CA 9.1 06/10/2025    ALB 3.8 2025    ALKPHO 115 2025    BILT 0.5 2025    TP 5.9 2025    AST 23 2025    ALT 21 2025    TSH 1.130 05/10/2019    MG 2.0 2023    PHOS 2.8 2023    TROP <0.045 2021       XR CHEST AP PORTABLE  (CPT=71045)  Result Date: 2025  CONCLUSION:  1. Minimal basilar atelectasis, with or without concurrent infectious process.  2. Cardiomegaly and retrocardiac hiatal hernia.    A preliminary report was issued by the [facility] teleradiology service. There are no major discrepancies.   Dictated by (CST): Danis Holloway MD on 2025 at 1:05 PM     Finalized by (CST): Danis Holloway MD on 2025 at 1:07 PM          US VENOUS DOPPLER LEG BILAT - DIAG IMG (CPT=93970)  Result Date: 2025  CONCLUSION:  1. Negative for sonographic evidence of deep venous thrombosis in the visualized portions of either lower extremity.  2. Subcutaneous edema of the left  lower extremity with nonvisualization of the calf veins.    A preliminary report was issued by the [facility] teleradiology service. There are no major discrepancies.   Dictated by (CST): Danis Holloway MD on 6/09/2025 at 7:34 AM     Finalized by (CST): Danis Holloway MD on 6/09/2025 at 7:35 AM          EKG 12 Lead  Result Date: 6/9/2025  Sinus rhythm with sinus arrhythmia with 1st degree A-V block Left axis deviation Inferior infarct , age undetermined Possible Anterolateral infarct , age undetermined Abnormal ECG No previous ECGs found in [facility] Confirmed by VINNY MOREAU CASH ([id number]) on 6/9/2025 10:35:56 AM      Assessment and Plan:     Acute on chronic diastolic heart failure  Secondary to poor compliance with diuretics  Pt was on aldactone only but possibly was supposed to be on lasix and aldactone together.  - cardiology on consult  - cont diuresis with IV lasix  - monitor I's and O's and daily weights.    LLE cellulitis  - cont IV ancef     CKD stage II  Avoid nephrotoxic medications  - monitor bmp with diuresis     Hx of aortic stenosis     Bipolar mood disorder  Cont home meds     Morbid obesity with obstructive sleep apnea  BMI 41.2.  Will  patient regarding lifestyle modifications, consider CPAP at night.    Hx of PE  - cont eliquis    dvt proph:    Eliquis      Code status:    Full       MDM:    High Andrew Brown MD  6/10/2025

## 2025-06-11 LAB — POTASSIUM SERPL-SCNC: 3.4 MMOL/L (ref 3.5–5.1)

## 2025-06-11 PROCEDURE — 99233 SBSQ HOSP IP/OBS HIGH 50: CPT | Performed by: HOSPITALIST

## 2025-06-11 RX ORDER — POTASSIUM CHLORIDE 1500 MG/1
40 TABLET, EXTENDED RELEASE ORAL ONCE
Status: COMPLETED | OUTPATIENT
Start: 2025-06-11 | End: 2025-06-11

## 2025-06-11 RX ORDER — POTASSIUM CHLORIDE 1.5 G/1.58G
40 POWDER, FOR SOLUTION ORAL ONCE
Status: DISCONTINUED | OUTPATIENT
Start: 2025-06-11 | End: 2025-06-11 | Stop reason: SDUPTHER

## 2025-06-11 NOTE — PAYOR COMM NOTE
--------------  ADMISSION REVIEW     Payor: [employer]  Subscriber #:  [id number]  Authorization Number: [id number]    Admit date: 6/10/25  Admit time:  3:07 PM         Admit Orders        Start        06/10/25 1507  Admit to inpatient                  06/09/25 0139  Place in observation                              ED Provider Notes        HPI   Pt is 83 yo M who arrives by EMS with elevated BP today. Pt had lasix stopped last week and added spirinolactone by cardiology. Has had increased swelling L>R legs since then. Denies chest pain, sob, headaches, blurry vision or dizziness. Became shaky during dinner today and didn't feel well. BP as high as 180s tonight at home.     ED Triage Vitals [06/08/25 2112]   BP (!) 180/82   Pulse 74   Resp 20   Temp 98.8 °F (37.1 °C)   Temp src Oral   SpO2 94 %   O2 Device None (Room air)   HEENT: EOMI, PERRL  Neck: supple, no jvd  CV: RRR, +holosystolic murmur  Resp: CTAB, no wheezes or retractions  Extremities: 2-3+ pitting edema BLE  Neuro: CN intact, normal speech, 5/5 motor strength in all extremities, no focal deficits  SKIN: warm, dry, no rashes      Labs Reviewed   CBC WITH DIFFERENTIAL WITH PLATELET - Abnormal; Notable for the following components:       Result Value    RDW-SD 47.1 (*)     Lymphocyte Absolute 0.79 (*)     All other components within normal limits   COMP METABOLIC PANEL (14) - Abnormal; Notable for the following components:    Glucose 143 (*)     Creatinine 1.35 (*)     eGFR-Cr 52 (*)    EKG    Rate, intervals and axes as noted on EKG Report.  Rate: 71  Rhythm: Sinus Rhythm  Reading: no MINI, normal EKG    Disposition and Plan     Clinical Impression:  1. Peripheral edema    2. Heart murmur       Disposition:  Admit  6/9/2025 12:28 am      History & Physical  History of Present Illness:  Prashanth Caceres is a(n) 84 year old male, with a past medical history significant for diastolic heart failure, PE, bipolar mood disorder presents with complaint of  increasing lower extremity swelling over the last few days.  Patient claims he was told by his cardiologist to stop taking Lasix and was started on spironolactone however note clearly indicates Lasix dose was to be increased.    Temp:  [98.8 °F (37.1 °C)] 98.8 °F (37.1 °C)  Pulse:  [67-74] 67  Resp:  [18-20] 18  BP: (154-180)/(81-82) 154/81  SpO2:  [94 %-98 %] 98 %     General:  Alert and oriented.  Diffuse skin problem:  None.  Eye:  Pupils are equal, round and reactive to light, extraocular movements are intact, Normal conjunctiva.  HENT:  Normocephalic, oral mucosa is moist.  Head:  Normocephalic, atraumatic.  Neck:  Supple, non-tender, no carotid bruit, no jugular venous distention, no lymphadenopathy, no thyromegaly.  Respiratory:  Lungs are clear to auscultation, respirations are non-labored, breath sounds are equal, symmetrical chest wall expansion.  Cardiovascular:  Normal rate, regular rhythm, no murmur, 2+ bilateral lower extremity pitting edema left greater than right  Gastrointestinal:  Soft, non-tender, non-distended, normal bowel sounds, no organomegaly.  Lymphatics:  No lymphadenopathy neck, axilla, groin.  Musculoskeletal: Normal range of motion.  normal strength.  Feet:  Normal pulses.  Neurologic:  Alert, oriented, no focal deficits, cranial nerves II-XII are grossly intact.  Cognition and Speech:  Oriented, speech clear and coherent.  Psychiatric:  Cooperative, appropriate mood & affect.     Lab Results   Component Value Date     WBC 8.0 06/08/2025     HGB 15.4 06/08/2025     HCT 46.6 06/08/2025     .0 06/08/2025     CREATSERUM 1.35 06/08/2025     BUN 21 06/08/2025      06/08/2025     K 4.1 06/08/2025      06/08/2025     CO2 27.0 06/08/2025      06/08/2025     CA 8.9 06/08/2025     ALB 3.8 06/08/2025     ALKPHO 115 06/08/2025     BILT 0.5 06/08/2025     TP 5.9 06/08/2025     AST 23 06/08/2025     ALT 21 06/08/2025       Assessment and Plan:     Acute on chronic diastolic  heart failure  Secondary to poor compliance with diuretics, will start patient on Lasix 40 mg IV daily, monitor I's and O's and daily weights.     CKD stage II  Avoid nephrotoxic medications, will monitor as needed.     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  Will  patient regarding lifestyle modifications, consider CPAP at night.     Prophylaxis  Subcutaneous heparin         6/10/25           Subjective:   Legs less swollen.     Blood pressure 135/80, pulse 78, temperature 97.8 °F (36.6 °C), temperature source Oral, resp. rate 20, height 68\", weight 262 lb 1.6 oz (118.9 kg), SpO2 94%.     Gen:    A and O x 3  CV:   RRR, no m/g/r  Pulm:   CTA bilat  Abd:   +bs, soft, NT, ND  LE:   venous stasis changes bilat.  1-2+ edema bilat.  3x5 cm warm pinkish area of LLE is less intense red.  Neuro:   nonfocal            Lab Results   Component Value Date     WBC 7.0 06/10/2025     HGB 16.2 06/10/2025     HCT 48.9 06/10/2025     .0 06/10/2025     CREATSERUM 1.49 (H) 06/10/2025     BUN 22 06/10/2025      06/10/2025     K 3.4 (L) 06/10/2025      06/10/2025     CO2 28.0 06/10/2025     GLU 80 06/10/2025     CA 9.1 06/10/2025     ALB 3.8 06/08/2025     ALKPHO 115 06/08/2025     BILT 0.5 06/08/2025     TP 5.9 06/08/2025     AST 23 06/08/2025     ALT 21 06/08/2025         XR CHEST AP PORTABLE  (CPT=71045)  Result Date: 6/9/2025  CONCLUSION:               1. Minimal basilar atelectasis, with or without concurrent infectious process.  2. Cardiomegaly and retrocardiac hiatal hernia.    A preliminary report was issued by the [facility] teleradiology service. There are no major discrepancies.   Dictated by (CST): Danis Holloway MD on 6/09/2025 at 1:05 PM     Finalized by (CST): Danis Holloway MD on 6/09/2025 at 1:07 PM           US VENOUS DOPPLER LEG BILAT - DIAG IMG  (CPT=93970)  Result Date: 6/9/2025  CONCLUSION:               1. Negative for sonographic evidence of deep venous thrombosis in the visualized portions of either lower extremity.  2. Subcutaneous edema of the left lower extremity with nonvisualization of the calf veins.    A preliminary report was issued by the [facility] teleradiology service. There are no major discrepancies.   Dictated by (CST): Danis Holloway MD on 6/09/2025 at 7:34 AM     Finalized by (CST): Danis Holloway MD on 6/09/2025 at 7:35 AM           EKG 12 Lead  Result Date: 6/9/2025  Sinus rhythm with sinus arrhythmia with 1st degree A-V block Left axis deviation Inferior infarct , age undetermined Possible Anterolateral infarct , age undetermined Abnormal ECG No previous ECGs found in [facility] Confirmed by VINNY MOREAU CASH ([id number]) on 6/9/2025 10:35:56 AM        Assessment and Plan:      Acute on chronic diastolic heart failure  Pt was on aldactone only but possibly was supposed to be on lasix and aldactone together.  - cardiology on consult  - cont diuresis with IV lasix  - monitor I's and O's and daily weights.     LLE cellulitis  - cont IV ancef     CKD stage II  Avoid nephrotoxic medications  - monitor bmp with diuresis     Hx of aortic stenosis     Morbid obesity with obstructive sleep apnea  BMI 41.2.  Will  patient regarding lifestyle modifications, consider CPAP at night.     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 Pt was on aldactone only but possibly was supposed to be on lasix and aldactone together.  - cardiology on consult  - cont diuresis with IV lasix  - aldactone  - monitor I's and O's and daily weights.     Paroxysmal afib  H/o CVA  - cont eliquis  - cont metoprolol     LLE cellulitis  - cont IV ancef     CKD stage II  Avoid nephrotoxic medications  - monitor bmp with diuresis     Hx of aortic stenosis      Morbid obesity with obstructive sleep apnea  BMI 41.2.  Will  patient regarding lifestyle modifications, consider CPAP at night.     Hx of PE  - cont eliquis     Subjective:    Initial Chief Complaint:  edema  Legs feels swollen but better. No SOB at rest.     Temp:  [97.7 °F (36.5 °C)-98.5 °F (36.9 °C)] 97.7 °F (36.5 °C)  Pulse:  [65-99] 65  Resp:  [16-20] 17  BP: (106-138)/(79-88) 121/88  SpO2:  [93 %-95 %] 95 %  General:  Alert, no distress  HEENT:  Normocephalic, atraumatic  Cardiac:  Regular rate, regular rhythm  Pulmonary:  Clear to auscultation bilaterally, respirations unlabored  Gastrointestinal:  Soft, non-tender, normal bowel sounds  Musculoskeletal:  No joint swelling  Extremities:  b/l LE edema. LLE erythema  Neurologic:  nonfocal  Psychiatric:  Normal affect, calm and appropriate      Lab 06/08/25  2148 06/10/25  0651   WBC 8.0 7.0   HGB 15.4 16.2   HCT 46.6 48.9   .0 158.0   RBC 4.81 5.21   MCV 96.9 93.9   MCH 32.0 31.1   MCHC 33.0 33.1   RDW 13.0 13.0   NEPRELIM 6.37 4.60      Lab 06/08/25  2148 06/10/25  0651 06/11/25  0717   BUN 21 22  --    CREATSERUM 1.35* 1.49*  --    CA 8.9 9.1  --    ALB 3.8  --   --     142  --    K 4.1 3.4* 3.4*    103  --    CO2 27.0 28.0  --    * 80  --    BILT  0.5  --   --    AST 23  --   --    ALT 21  --   --    ALKPHO 115  --   --    TP 5.9  --   --       [Scheduled Medications]   spironolactone  25 mg Oral Daily    metoprolol succinate  25 mg Oral Daily Beta Blocker    busPIRone  5 mg Oral BID    DULoxetine  90 mg Oral Daily    memantine  10 mg Oral BID    rosuvastatin  20 mg Oral Daily    oxybutynin  5 mg Oral BID    furosemide  40 mg Intravenous Daily    pantoprazole  40 mg Oral QAM AC    ceFAZolin  2 g Intravenous Q8H    minerin   Topical Daily    apixaban  5 mg Oral BID                     CARDIOLOGY  Impression/Plan:  84 year old male presenting with:     1) HFpEF, acute on chronic (EF 55-60% 5/2025)  2) AF on eliquis  3) CVA  4) HTN  5) HL  6) CKD  7) Dementia  8) Prostate ca  9) RA myxoma, 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  10) LLE cellulitis     - IV diuresis with lasix 40mg daily; strict I/Os, monitoring of renal function and electrolytes  - Add spironolactone 25mg daily (this was planned in the outpatient setting, however appears instructions misconstrued)  - Cont statin, beta blocker  - Cont eliquis for stroke prevention (monitor renal function, dose accordingly)  - Abx as per primary service  - Monitor on tele    MEDICATIONS ADMINISTERED IN LAST 1 DAY:  apixaban (Eliquis) tab 5 mg       Date Action Dose Route User    6/11/2025 0954 Given 5 mg Oral Donna Rodrigues RN    6/10/2025 2035 Given 5 mg Oral Jayleen Mckeon RN     ceFAZolin (Ancef) 2g in 10mL IV syringe premix       Date Action Dose Route User    6/11/2025 1139 New Bag 2 g Intravenous Donna Rodrigues RN    6/11/2025 0510 New Bag 2 g Intravenous Jayleen Mckeon RN    6/10/2025 2035 New Bag 2 g Intravenous Jayleen Mckeon RN    6/10/2025 1312 New Bag 2 g Intravenous Rubens Shaw RN          furosemide (Lasix) 10 mg/mL injection 40 mg       Date Action Dose Route User    6/11/2025 0955 Given 40 mg Intravenous Donna Rodrigues RN          memantine (Namenda) tab 10 mg       Date Action Dose Route User     6/11/2025 0955 Given 10 mg Oral Donna Rodrigues RN    6/10/2025 2035 Given 10 mg Oral Jayleen Mckeon RN          metoprolol succinate ER (Toprol XL) 24 hr tab 25 mg       Date Action Dose Route User    6/11/2025 0510 Given 25 mg Oral Jayleen Mckeon RN          minerin (Eucirin) cream       Date Action Dose Route User    6/11/2025 0955 Given (none) Topical Donna Rodrigues RN          oxybutynin (Ditropan) tab 5 mg       Date Action Dose Route User    6/11/2025 0953 Given 5 mg Oral Donna Rodrigues RN    6/10/2025 2035 Given 5 mg Oral Jayleen Mckeon RN          pantoprazole (Protonix) DR tab 40 mg       Date Action Dose Route User    6/11/2025 0510 Given 40 mg Oral Jayleen Mckeon RN          potassium chloride (Klor-Con M20) tab 40 mEq       Date Action Dose Route User    6/10/2025 1537 Given 40 mEq Oral Rubens Shaw RN          potassium chloride (Klor-Con M20) tab 40 mEq       Date Action Dose Route User    6/11/2025 1008 Given 40 mEq Oral Donna Rodrigues RN          rosuvastatin (Crestor) tab 20 mg       Date Action Dose Route User    6/11/2025 0953 Given 20 mg Oral Donna Rodrigues RN          spironolactone (Aldactone) tab 25 mg       Date Action Dose Route User    6/11/2025 0953 Given 25 mg Oral Donna Rodrigues RN          DULoxetine (Cymbalta) DR cap 90 mg       Date Action Dose Route User    6/11/2025 0953 Given 90 mg Oral Donna Rodrigues RN            Vitals (last day)       Date/Time Temp Pulse Resp BP SpO2 Weight O2 Device O2 Flow Rate (L/min) Who    06/11/25 0949 97.7 °F (36.5 °C) 65 17 121/88 95 % -- None (Room air) -- DAVID    06/11/25 0509 98 °F (36.7 °C) 72 16 106/80 93 % -- None (Room air) -- SW    06/10/25 2034 98.5 °F (36.9 °C) 78 20 138/87 95 % -- None (Room air) -- SW    06/10/25 1533 98.5 °F (36.9 °C) 99 20 132/79 94 % -- None (Room air) -- FV    06/10/25 1021 97.8 °F (36.6 °C) 78 20 135/80 94 % -- None (Room air) -- FV

## 2025-06-11 NOTE — PROGRESS NOTES
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  Today patient denies chest pain, palpitations, dyspnea.    Problem List[1]    Objective:   Temp: 97.7 °F (36.5 °C)  Pulse: 65  Resp: 17  BP: 121/88    Intake/Output:     Intake/Output Summary (Last 24 hours) at 2025 1105  Last data filed at 2025 0000  Gross per 24 hour   Intake 650 ml   Output 1150 ml   Net -500 ml       Last 3 Weights   25 0509 265 lb (120.2 kg)   06/10/25 0530 262 lb 1.6 oz (118.9 kg)   25 0152 271 lb 6.4 oz (123.1 kg)   25 2112 272 lb (123.4 kg)   23 1347 278 lb (126.1 kg)   23 2242 250 lb 12.8 oz (113.8 kg)   23 1313 270 lb (122.5 kg)         Physical Exam:    General: Alert and oriented x 3. No apparent distress. No respiratory or constitutional distress.  HEENT: Normocephalic, anicteric sclera, neck supple, no thyromegaly or adenopathy.  Neck: No JVD, carotids 2+, no bruits.  Cardiac: Regular rate and rhythm. S1, S2 normal. No murmur, pericardial rub, S3, thrill, heave or extra cardiac sounds.  Lungs: Clear without wheezes,  rales, rhonchi or dullness.  Normal excursions and effort.  Abdomen: Soft, non-tender. No organosplenomegally, mass or rebound, BS-present.  Extremities: 1-2+ pitting edema bilat Les, LLE +erythema  Neurologic: Alert and oriented, normal affect. No motor or coordinational deficit.  Skin: Warm and dry.     Laboratory/Data:    Labs:         Recent Labs   Lab 06/08/25  2148 06/10/25  0651   WBC 8.0 7.0   HGB 15.4 16.2   MCV 96.9 93.9   .0 158.0       Recent Labs   Lab 06/08/25  2148 06/10/25  0651 06/11/25  0717    142  --    K 4.1 3.4* 3.4*    103  --    CO2 27.0 28.0  --    BUN 21 22  --    CREATSERUM 1.35* 1.49*  --    CA 8.9 9.1  --    * 80  --        Recent Labs   Lab 06/08/25 2148   ALT 21   AST 23   ALB 3.8       No results for input(s): \"TROP\" in the last 168 hours.    Allergies:   Allergies[2]    Medications:  Current Hospital Medications[3]    Scooby Ricks MD  6/11/2025  11:05 AM         [1]   Patient Active Problem List  Diagnosis    Acute diverticulitis    Chronic kidney disease    Hypertension    Bipolar affective (HCC)    High cholesterol    ESTHER on CPAP    Dyspnea    Weight gain    Morbid obesity with BMI of 40.0-44.9, adult ([facility])    History of depression    History of stroke    Acute kidney injury    Hypokalemia    Coronary artery disease involving native coronary artery of native heart without angina pectoris    Chronic heart failure with preserved ejection fraction (HCC)    Gastrointestinal hemorrhage, unspecified gastrointestinal hemorrhage type    Blood loss anemia    Peripheral edema    Heart murmur   [2]   Allergies  Allergen Reactions    Cat Hair Extract OTHER (SEE COMMENTS)    Radiology Contrast Iodinated Dyes OTHER (SEE COMMENTS)    Other ITCHING     Cat Dander   [3]   Current Facility-Administered Medications   Medication Dose Route Frequency    spironolactone (Aldactone) tab 25 mg  25 mg Oral Daily    metoprolol succinate ER (Toprol XL) 24 hr tab 25 mg  25 mg Oral  Daily Beta Blocker    busPIRone (Buspar) tab 5 mg  5 mg Oral BID    DULoxetine (Cymbalta)  cap 90 mg  90 mg Oral Daily    memantine (Namenda) tab 10 mg  10 mg Oral BID    rosuvastatin (Crestor) tab 20 mg  20 mg Oral Daily    oxybutynin (Ditropan) tab 5 mg  5 mg Oral BID    ondansetron (Zofran) 4 MG/2ML injection 4 mg  4 mg Intravenous Q6H PRN    acetaminophen (Tylenol) tab 650 mg  650 mg Oral Q6H PRN    hydrALAzine (Apresoline) 20 mg/mL injection 10 mg  10 mg Intravenous Q4H PRN    furosemide (Lasix) 10 mg/mL injection 40 mg  40 mg Intravenous Daily    zolpidem (Ambien) tab 5 mg  5 mg Oral Nightly PRN    alum-mag hydroxide-simethicone (Maalox) 200-200-20 MG/5ML oral suspension 30 mL  30 mL Oral QID PRN    pantoprazole (Protonix) DR tab 40 mg  40 mg Oral QAM AC    haloperidol lactate (Haldol) 5 MG/ML injection 2 mg  2 mg Intravenous Q6H PRN    ceFAZolin (Ancef) 2g in 10mL IV syringe premix  2 g Intravenous Q8H    minerin (Eucirin) cream   Topical Daily    apixaban (Eliquis) tab 5 mg  5 mg Oral BID

## 2025-06-11 NOTE — PROGRESS NOTES
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  Today patient states peripheral edema improved but not resolved.  Denies chest pain, palpitations, dyspnea.    Problem List[1]    Objective:   Temp: 98.5 °F (36.9 °C)  Pulse: 99  Resp: 20  BP: 132/79    Intake/Output:     Intake/Output Summary (Last 24 hours) at 6/10/2025 192  Last data filed at 6/10/2025 1745  Gross per 24 hour   Intake 662 ml   Output 1600 ml   Net -938 ml       Last 3 Weights   06/10/25 0530 262 lb 1.6 oz (118.9 kg)   25 0152 271 lb 6.4 oz (123.1 kg)   25 2112 272 lb (123.4 kg)   23 1347 278 lb (126.1 kg)   23 2242 250 lb 12.8 oz (113.8 kg)   23 1313 270 lb (122.5 kg)       Tele: NSR, PACs, PVCs    Physical Exam:    General: Alert and oriented x 3. No apparent distress. No respiratory or constitutional distress.  HEENT: Normocephalic, anicteric sclera, neck supple, no thyromegaly or adenopathy.  Neck: No JVD, carotids 2+, no bruits.  Cardiac: Regular rate and rhythm. S1, S2 normal. No murmur, pericardial rub, S3, thrill, heave or extra  cardiac sounds.  Lungs: Clear without wheezes, rales, rhonchi or dullness.  Normal excursions and effort.  Abdomen: Soft, non-tender. No organosplenomegally, mass or rebound, BS-present.  Extremities: 2+ pitting edema bilat Les; LLE erythema/warmth  Neurologic: Alert and oriented, normal affect. No motor or coordinational deficit.  Skin: Warm and dry.     Laboratory/Data:    Labs:         Recent Labs   Lab 06/08/25  2148 06/10/25  0651   WBC 8.0 7.0   HGB 15.4 16.2   MCV 96.9 93.9   .0 158.0       Recent Labs   Lab 06/08/25  2148 06/10/25  0651    142   K 4.1 3.4*    103   CO2 27.0 28.0   BUN 21 22   CREATSERUM 1.35* 1.49*   CA 8.9 9.1   * 80       Recent Labs   Lab 06/08/25 2148   ALT 21   AST 23   ALB 3.8       No results for input(s): \"TROP\" in the last 168 hours.    Allergies:   Allergies[2]    Medications:  Current Hospital Medications[3]    Scooby Ricks MD  6/10/2025  7:21 PM         [1]   Patient Active Problem List  Diagnosis    Acute diverticulitis    Chronic kidney disease    Hypertension    Bipolar affective (HCC)    High cholesterol    ESTHER on CPAP    Dyspnea    Weight gain    Morbid obesity with BMI of 40.0-44.9, adult (HCC)    History of depression    History of stroke    Acute kidney injury    Hypokalemia    Coronary artery disease involving native coronary artery of native heart without angina pectoris    Chronic heart failure with preserved ejection fraction (HCC)    Gastrointestinal hemorrhage, unspecified gastrointestinal hemorrhage type    Blood loss anemia    Peripheral edema    Heart murmur   [2]   Allergies  Allergen Reactions    Cat Hair Extract OTHER (SEE COMMENTS)    Radiology Contrast Iodinated Dyes OTHER (SEE COMMENTS)    Other ITCHING     Cat Dander   [3]   Current Facility-Administered Medications   Medication Dose Route Frequency    [START ON 6/11/2025] spironolactone (Aldactone) tab 25 mg  25 mg Oral Daily    busPIRone (Buspar) tab 5 mg  5 mg Oral BID     DULoxetine (Cymbalta)  cap 90 mg  90 mg Oral Daily    memantine (Namenda) tab 10 mg  10 mg Oral BID    rosuvastatin (Crestor) tab 20 mg  20 mg Oral Daily    oxybutynin (Ditropan) tab 5 mg  5 mg Oral BID    ondansetron (Zofran) 4 MG/2ML injection 4 mg  4 mg Intravenous Q6H PRN    acetaminophen (Tylenol) tab 650 mg  650 mg Oral Q6H PRN    hydrALAzine (Apresoline) 20 mg/mL injection 10 mg  10 mg Intravenous Q4H PRN    furosemide (Lasix) 10 mg/mL injection 40 mg  40 mg Intravenous Daily    zolpidem (Ambien) tab 5 mg  5 mg Oral Nightly PRN    alum-mag hydroxide-simethicone (Maalox) 200-200-20 MG/5ML oral suspension 30 mL  30 mL Oral QID PRN    pantoprazole (Protonix) DR tab 40 mg  40 mg Oral QAM AC    haloperidol lactate (Haldol) 5 MG/ML injection 2 mg  2 mg Intravenous Q6H PRN    ceFAZolin (Ancef) 2g in 10mL IV syringe premix  2 g Intravenous Q8H    minerin (Eucirin) cream   Topical Daily    apixaban (Eliquis) tab 5 mg  5 mg Oral BID

## 2025-06-11 NOTE — PROGRESS NOTES
[facility]  Progress Note     Prashanth Caceres  : 10/21/1940    Status: Inpatient  Day #: 1    Attending: Dg Pulido MD  PCP: TOMMIE HOLLIS MD     Assessment and Plan:    Acute on chronic diastolic heart failure  - Secondary to poor compliance with diuretics. Pt was on aldactone only but possibly was supposed to be on lasix and aldactone together.  - cardiology on consult  - cont diuresis with IV lasix  - aldactone  - monitor I's and O's and daily weights.    Paroxysmal afib  H/o CVA  - cont eliquis  - cont metoprolol     LLE cellulitis  - cont IV ancef     CKD stage II  Avoid nephrotoxic medications  - monitor bmp with diuresis     Hx of aortic stenosis     Bipolar mood disorder  Cont home meds     Morbid obesity with obstructive sleep apnea  BMI 41.2.  Will  patient regarding lifestyle modifications, consider CPAP at night.     Hx of PE  - cont eliquis        DVT Mechanical Prophylaxis:        DVT Pharmacologic Prophylaxis   Medication    apixaban (Eliquis) tab 5 mg               Subjective:      Initial Chief Complaint:  edema    Legs feels swollen but better. No SOB at rest.    10 point ROS completed and was negative, except for pertinent positive and negatives stated in subjective.      Objective:      Temp:  [97.7 °F (36.5 °C)-98.5 °F (36.9 °C)] 97.7 °F (36.5 °C)  Pulse:  [65-99] 65  Resp:  [16-20] 17  BP: (106-138)/(79-88) 121/88  SpO2:  [93 %-95 %] 95 %  General:  Alert, no distress  HEENT:  Normocephalic, atraumatic  Cardiac:  Regular rate, regular rhythm  Pulmonary:  Clear to auscultation bilaterally, respirations unlabored  Gastrointestinal:  Soft, non-tender, normal bowel sounds  Musculoskeletal:  No joint swelling  Extremities:  b/l LE edema. LLE erythema  Neurologic:  nonfocal  Psychiatric:  Normal affect, calm and appropriate    Intake/Output Summary (Last 24 hours) at 2025 1127  Last data filed at 2025 1000  Gross per 24 hour   Intake 960 ml   Output 1150 ml   Net -190  ml         Recent Labs   Lab 06/08/25 2148 06/10/25  0651   WBC 8.0 7.0   HGB 15.4 16.2   HCT 46.6 48.9   .0 158.0   RBC 4.81 5.21   MCV 96.9 93.9   MCH 32.0 31.1   MCHC 33.0 33.1   RDW 13.0 13.0   NEPRELIM 6.37 4.60     Recent Labs   Lab 06/08/25 2148 06/10/25  0651 06/11/25  0717   BUN 21 22  --    CREATSERUM 1.35* 1.49*  --    CA 8.9 9.1  --    ALB 3.8  --   --     142  --    K 4.1 3.4* 3.4*    103  --    CO2 27.0 28.0  --    * 80  --    BILT 0.5  --   --    AST 23  --   --    ALT 21  --   --    ALKPHO 115  --   --    TP 5.9  --   --        No results found.      Medications:  Scheduled Medications[1]   PRN Meds: PRN Medications[2]    Supplementary Documentation:   DVT Mechanical Prophylaxis:        DVT Pharmacologic Prophylaxis   Medication    apixaban (Eliquis) tab 5 mg                Code Status: Prior  Naranjo: External urinary catheter in place  Naranjo Duration (in days):   Central line:    DONTAE: 6/13/2025        **Certification      PHYSICIAN Certification of Need for Inpatient Hospitalization - Initial Certification    Patient will require inpatient services that will reasonably be expected to span two midnight's based on the clinical documentation in H+P.   Based on patients current state of illness, I anticipate that, after discharge, patient will require TBD.            [facility]. Time spent on chart/note review, review of labs/imaging, discussion with patient, physical exam, discussion with staff, consultants, coordinating care, writing progress note, discussion of plan of care.      Dg Pulido MD         [1]    spironolactone  25 mg Oral Daily    metoprolol succinate  25 mg Oral Daily Beta Blocker    busPIRone  5 mg Oral BID    DULoxetine  90 mg Oral Daily    memantine  10 mg Oral BID    rosuvastatin  20 mg Oral Daily    oxybutynin  5 mg Oral BID    furosemide  40 mg Intravenous Daily    pantoprazole  40 mg Oral QAM AC    ceFAZolin  2 g Intravenous Q8H    minerin   Topical Daily     apixaban  5 mg Oral BID   [2]   ondansetron    acetaminophen    hydrALAzine    zolpidem    alum-mag hydroxide-simethicone    haloperidol lactate

## 2025-06-11 NOTE — CM/SW NOTE
06/11/25 1400   CM/SW Referral Data   Referral Source Social Work (self-referral)   Reason for Referral Discharge planning   Informant Spouse/Significant Other   Medical Hx   Does patient have an established PCP? Yes  (Sonny Ramirez until June 30th, can not recall new MD's name)   Patient Info   Patient's Home Environment Independent Living  ([facility])   Patient lives with Spouse/Significant other   Patient Status Prior to Admission   Independent with ADLs and Mobility No   Pt. requires assistance with Housework;Driving;Ambulating   Services in place prior to admission DME/Supplies at home   Type of DME/Supplies Straight Cane;Standard Walker;Scooter;Shower Chair   Discharge Needs   Anticipated D/C needs To be determined   Services Requested   Submitted to [facility] Yes   PASRR Level 1 Submitted Yes   Choice of Post-Acute Provider   Informed patient of right to choose their preferred provider Yes   List of appropriate post-acute services provided to patient/family with quality data No - Declined list   Patient/family choice [facility] self referred pt for DC Planning after request from wife to see her.  SW received call back from pt's wife Tameka, she is at bedside.    SW met w/ pt's wife. Above assessment completed.  Verified pt's home address and confirmed they live at [facility]. They have lived there for approx 11.5 months.    Pt's wife confirmed pt uses a motorized scooter for long distances (from their apt to Health Center/Dining area), a cane inside the apt, and a walker outside the apt.    Pt's wife confirmed pt no longer drives. Pt's wife does the driving.  Pt's PCP is currently Dr. Sonny Ramirez through June 30th. Pt's insurance is then no longer in network w/ Dr. Ramirez. Tameka confirmed pt has a new PCP lined up but she can not recall their name.    Pt has hx w/ HH/onsite PT through [facility]. Pt has no hx w/ [facility].    Pt's wife stating pt was set up w/ a very strict/specific  pelvic floor PT program and only \"stuck with it\" for the first week.  Pt and wife then hired CG's for 10 days until pt refused to have them any more.    Pt's wife informed him that he either had to go to [facility] and stay (LTC) or have CG's come in again.    Per Tameka, pt was agreeable to re-hiring CG's right before hospitalization.    Tameka is now inquiring if pt can go to [facility] for a short period before returning to [facility] w/ CG's.    ROXI explained pt would need specific nursing and/or PT/OT needs to qualify under his [employer] for [facility] [place]. Explained pt is currently ambulating 200+ ft w/ mobility techs in the hallway.    SW discussed possible options. Confirmed sometimes [facility] residents have a policy in their contract allowing them to go to [facility] for a couple days. SW informed pt's wife that is NOT guaranteed.    Per request, ROXI left Vmail for liaison Krista / [place]. Referral sent in Aidin to confirm if they can accept or would need auth via [employer] - pending response. PASRR completed/uploaded. CLRC sent.    ROXI also sent tentative HH referral in Aidin. F2F entered/sent.    Will need to f/up w/ pt's wife once response is received from [facility].    MD and RN updated on above. MD to place PT/OT order to confirm pt's mobility status at this time.    Need for DC:  PT/OT evals to confirm mobility  Response from [facility] (if auth needed or something in his [facility] contract)  If no to [facility]: HH list/choice      PLAN: [facility] w/ HH vs [facility] - pending above & med clear      SW/CM to remain available for support and/or discharge planning.       Su, MSW, LSW [phone]

## 2025-06-11 NOTE — PLAN OF CARE
Patient alert and oriented x 4 on room air. Patient denies pain. IV lasix and antibiotics continued. Call light within reach and safety precautions in place.     Problem: Patient Centered Care  Goal: Patient preferences are identified and integrated in the patient's plan of care  Description: Interventions:  - What would you like us to know as we care for you? Per wife, patient is not allowed dairy as per GI MD  - Provide timely, complete, and accurate information to patient/family  - Incorporate patient and family knowledge, values, beliefs, and cultural backgrounds into the planning and delivery of care  - Encourage patient/family to participate in care and decision-making at the level they choose  - Honor patient and family perspectives and choices  Outcome: Progressing     Problem: Patient/Family Goals  Goal: Patient/Family Long Term Goal  Description: Patient's Long Term Goal: ensure medications work well and don't need to be changed by MD    Interventions:  - monitor new medications and give education  - ensure patient and wife understand importance of diet and fluid intake  - See additional Care Plan goals for specific interventions  Outcome: Progressing  Goal: Patient/Family Short Term Goal  Description: Patient's Short Term Goal: get rid of leg swelling    Interventions:   - monitor labs  - monitor intake and output  - diuretic medication as per MD order  - See additional Care Plan goals for specific interventions  Outcome: Progressing     Problem: CARDIOVASCULAR - ADULT  Goal: Maintains optimal cardiac output and hemodynamic stability  Description: INTERVENTIONS:  - Monitor vital signs, rhythm, and trends  - Monitor for bleeding, hypotension and signs of decreased cardiac output  - Evaluate effectiveness of vasoactive medications to optimize hemodynamic stability  - Monitor arterial and/or venous puncture sites for bleeding and/or hematoma  - Assess quality of pulses, skin color and temperature  - Assess  for signs of decreased coronary artery perfusion - ex. Angina  - Evaluate fluid balance, assess for edema, trend weights  Outcome: Progressing  Goal: Absence of cardiac arrhythmias or at baseline  Description: INTERVENTIONS:  - Continuous cardiac monitoring, monitor vital signs, obtain 12 lead EKG if indicated  - Evaluate effectiveness of antiarrhythmic and heart rate control medications as ordered  - Initiate emergency measures for life threatening arrhythmias  - Monitor electrolytes and administer replacement therapy as ordered  Outcome: Progressing     Problem: RESPIRATORY - ADULT  Goal: Achieves optimal ventilation and oxygenation  Description: INTERVENTIONS:  - Assess for changes in respiratory status  - Assess for changes in mentation and behavior  - Position to facilitate oxygenation and minimize respiratory effort  - Oxygen supplementation based on oxygen saturation or ABGs  - Provide Smoking Cessation handout, if applicable  - Encourage broncho-pulmonary hygiene including cough, deep breathe, Incentive Spirometry  - Assess the need for suctioning and perform as needed  - Assess and instruct to report SOB or any respiratory difficulty  - Respiratory Therapy support as indicated  - Manage/alleviate anxiety  - Monitor for signs/symptoms of CO2 retention  Outcome: Progressing     Problem: METABOLIC/FLUID AND ELECTROLYTES - ADULT  Goal: Electrolytes maintained within normal limits  Description: INTERVENTIONS:  - Monitor labs and rhythm and assess patient for signs and symptoms of electrolyte imbalances  - Administer electrolyte replacement as ordered  - Monitor response to electrolyte replacements, including rhythm and repeat lab results as appropriate  - Fluid restriction as ordered  - Instruct patient on fluid and nutrition restrictions as appropriate  Outcome: Progressing  Goal: Hemodynamic stability and optimal renal function maintained  Description: INTERVENTIONS:  - Monitor labs and assess for signs and  symptoms of volume excess or deficit  - Monitor intake, output and patient weight  - Monitor urine specific gravity, serum osmolarity and serum sodium as indicated or ordered  - Monitor response to interventions for patient's volume status, including labs, urine output, blood pressure (other measures as available)  - Encourage oral intake as appropriate  - Instruct patient on fluid and nutrition restrictions as appropriate  Outcome: Progressing

## 2025-06-11 NOTE — CM/SW NOTE
Notified by ADELAIDE Ortiz earlier that pt's wife would like to speak to SW about \"higher level of care\" at NH.    SW attempted pt's wife at bedside - no one present at this time.  SW attempted pt's wife via phone - no answer. Left Vmail requesting f/up.    Per chart and RN rounds, pt is from [facility] w/ wife.  Pt has been ambulating w/ walker while at [facility]. Pt was up ambulating 220 ft in the hallways yesterday 6/10.    PLAN: [facility] - pending f/up from wife & med clear      ROXI/CM to remain available for support and/or discharge planning.       Su, MSW, LSW [id number]

## 2025-06-12 LAB
ANION GAP SERPL CALC-SCNC: 8 MMOL/L (ref 0–18)
ANION GAP SERPL CALC-SCNC: 9 MMOL/L (ref 0–18)
BASOPHILS # BLD AUTO: 0.06 X10(3) UL (ref 0–0.2)
BASOPHILS NFR BLD AUTO: 0.8 %
BUN BLD-MCNC: 28 MG/DL (ref 9–23)
BUN BLD-MCNC: 29 MG/DL (ref 9–23)
BUN/CREAT SERPL: 18.1 (ref 10–20)
BUN/CREAT SERPL: 18.8 (ref 10–20)
CALCIUM BLD-MCNC: 8.6 MG/DL (ref 8.7–10.4)
CALCIUM BLD-MCNC: 9.1 MG/DL (ref 8.7–10.4)
CHLORIDE SERPL-SCNC: 101 MMOL/L (ref 98–112)
CHLORIDE SERPL-SCNC: 103 MMOL/L (ref 98–112)
CO2 SERPL-SCNC: 28 MMOL/L (ref 21–32)
CO2 SERPL-SCNC: 29 MMOL/L (ref 21–32)
CREAT BLD-MCNC: 1.49 MG/DL (ref 0.7–1.3)
CREAT BLD-MCNC: 1.6 MG/DL (ref 0.7–1.3)
DEPRECATED RDW RBC AUTO: 45.7 FL (ref 35.1–46.3)
EGFRCR SERPLBLD CKD-EPI 2021: 43 ML/MIN/1.73M2 (ref 60–?)
EGFRCR SERPLBLD CKD-EPI 2021: 47 ML/MIN/1.73M2 (ref 60–?)
EOSINOPHIL # BLD AUTO: 0.21 X10(3) UL (ref 0–0.7)
EOSINOPHIL NFR BLD AUTO: 2.9 %
ERYTHROCYTE [DISTWIDTH] IN BLOOD BY AUTOMATED COUNT: 12.9 % (ref 11–15)
GLUCOSE BLD-MCNC: 104 MG/DL (ref 70–99)
GLUCOSE BLD-MCNC: 92 MG/DL (ref 70–99)
HCT VFR BLD AUTO: 48.3 % (ref 39–53)
HGB BLD-MCNC: 16.1 G/DL (ref 13–17.5)
IMM GRANULOCYTES # BLD AUTO: 0.01 X10(3) UL (ref 0–1)
IMM GRANULOCYTES NFR BLD: 0.1 %
LYMPHOCYTES # BLD AUTO: 1 X10(3) UL (ref 1–4)
LYMPHOCYTES NFR BLD AUTO: 13.8 %
MCH RBC QN AUTO: 31.8 PG (ref 26–34)
MCHC RBC AUTO-ENTMCNC: 33.3 G/DL (ref 31–37)
MCV RBC AUTO: 95.5 FL (ref 80–100)
MONOCYTES # BLD AUTO: 1.2 X10(3) UL (ref 0.1–1)
MONOCYTES NFR BLD AUTO: 16.6 %
NEUTROPHILS # BLD AUTO: 4.75 X10 (3) UL (ref 1.5–7.7)
NEUTROPHILS # BLD AUTO: 4.75 X10(3) UL (ref 1.5–7.7)
NEUTROPHILS NFR BLD AUTO: 65.8 %
OSMOLALITY SERPL CALC.SUM OF ELEC: 293 MOSM/KG (ref 275–295)
OSMOLALITY SERPL CALC.SUM OF ELEC: 294 MOSM/KG (ref 275–295)
PLATELET # BLD AUTO: 167 10(3)UL (ref 150–450)
POTASSIUM SERPL-SCNC: 3.5 MMOL/L (ref 3.5–5.1)
POTASSIUM SERPL-SCNC: 3.5 MMOL/L (ref 3.5–5.1)
POTASSIUM SERPL-SCNC: 4.1 MMOL/L (ref 3.5–5.1)
RBC # BLD AUTO: 5.06 X10(6)UL (ref 3.8–5.8)
SODIUM SERPL-SCNC: 139 MMOL/L (ref 136–145)
SODIUM SERPL-SCNC: 139 MMOL/L (ref 136–145)
WBC # BLD AUTO: 7.2 X10(3) UL (ref 4–11)

## 2025-06-12 PROCEDURE — 99233 SBSQ HOSP IP/OBS HIGH 50: CPT | Performed by: HOSPITALIST

## 2025-06-12 RX ORDER — FUROSEMIDE 10 MG/ML
40 INJECTION INTRAMUSCULAR; INTRAVENOUS
Status: DISCONTINUED | OUTPATIENT
Start: 2025-06-12 | End: 2025-06-13

## 2025-06-12 NOTE — PLAN OF CARE
Patient alert and oriented. IV ancef and IV lasix continued. Up x1 with walker. Fall precautions in place.    Problem: CARDIOVASCULAR - ADULT  Goal: Maintains optimal cardiac output and hemodynamic stability  Description: INTERVENTIONS:  - Monitor vital signs, rhythm, and trends  - Monitor for bleeding, hypotension and signs of decreased cardiac output  - Evaluate effectiveness of vasoactive medications to optimize hemodynamic stability  - Monitor arterial and/or venous puncture sites for bleeding and/or hematoma  - Assess quality of pulses, skin color and temperature  - Assess for signs of decreased coronary artery perfusion - ex. Angina  - Evaluate fluid balance, assess for edema, trend weights  Outcome: Progressing  Goal: Absence of cardiac arrhythmias or at baseline  Description: INTERVENTIONS:  - Continuous cardiac monitoring, monitor vital signs, obtain 12 lead EKG if indicated  - Evaluate effectiveness of antiarrhythmic and heart rate control medications as ordered  - Initiate emergency measures for life threatening arrhythmias  - Monitor electrolytes and administer replacement therapy as ordered  Outcome: Progressing     Problem: RESPIRATORY - ADULT  Goal: Achieves optimal ventilation and oxygenation  Description: INTERVENTIONS:  - Assess for changes in respiratory status  - Assess for changes in mentation and behavior  - Position to facilitate oxygenation and minimize respiratory effort  - Oxygen supplementation based on oxygen saturation or ABGs  - Provide Smoking Cessation handout, if applicable  - Encourage broncho-pulmonary hygiene including cough, deep breathe, Incentive Spirometry  - Assess the need for suctioning and perform as needed  - Assess and instruct to report SOB or any respiratory difficulty  - Respiratory Therapy support as indicated  - Manage/alleviate anxiety  - Monitor for signs/symptoms of CO2 retention  Outcome: Progressing     Problem: METABOLIC/FLUID AND ELECTROLYTES - ADULT  Goal:  Electrolytes maintained within normal limits  Description: INTERVENTIONS:  - Monitor labs and rhythm and assess patient for signs and symptoms of electrolyte imbalances  - Administer electrolyte replacement as ordered  - Monitor response to electrolyte replacements, including rhythm and repeat lab results as appropriate  - Fluid restriction as ordered  - Instruct patient on fluid and nutrition restrictions as appropriate  Outcome: Progressing  Goal: Hemodynamic stability and optimal renal function maintained  Description: INTERVENTIONS:  - Monitor labs and assess for signs and symptoms of volume excess or deficit  - Monitor intake, output and patient weight  - Monitor urine specific gravity, serum osmolarity and serum sodium as indicated or ordered  - Monitor response to interventions for patient's volume status, including labs, urine output, blood pressure (other measures as available)  - Encourage oral intake as appropriate  - Instruct patient on fluid and nutrition restrictions as appropriate  Outcome: Progressing     Problem: Patient/Family Goals  Goal: Patient/Family Long Term Goal  Description: Patient's Long Term Goal: ensure medications work well and don't need to be changed by MD    Interventions:  - monitor new medications and give education  - ensure patient and wife understand importance of diet and fluid intake  - See additional Care Plan goals for specific interventions  Outcome: Progressing  Goal: Patient/Family Short Term Goal  Description: Patient's Short Term Goal: get rid of leg swelling    Interventions:   - monitor labs  - monitor intake and output  - diuretic medication as per MD order  - See additional Care Plan goals for specific interventions  Outcome: Progressing     Problem: Patient Centered Care  Goal: Patient preferences are identified and integrated in the patient's plan of care  Description: Interventions:  - What would you like us to know as we care for you? Per wife, patient is not  allowed dairy as per GI MD  - Provide timely, complete, and accurate information to patient/family  - Incorporate patient and family knowledge, values, beliefs, and cultural backgrounds into the planning and delivery of care  - Encourage patient/family to participate in care and decision-making at the level they choose  - Honor patient and family perspectives and choices  Outcome: Progressing

## 2025-06-12 NOTE — PHYSICAL THERAPY NOTE
PHYSICAL THERAPY EVALUATION - INPATIENT     Room Number: 322/322-A  Evaluation Date: 6/12/2025  Type of Evaluation: Initial   Physician Order: PT Eval and Treat    Presenting Problem: peripheral edema     Reason for Therapy: Mobility Dysfunction and Discharge Planning    PHYSICAL THERAPY ASSESSMENT   Patient is a 84 year old male admitted 6/8/2025 for peripheral edema.  Prior to admission, patient's baseline is independent with ADLs and functional mobility with a cane inside the home, RW outside, and scooter for long distances.  Patient is currently functioning below baseline with bed mobility, transfers, gait, standing prolonged periods, and performing household tasks.  Patient is requiring minimal assist as a result of the following impairments: decreased functional strength, impaired standing balance, decreased muscular endurance, and medical status.  Physical Therapy will continue to follow for duration of hospitalization.    Patient will benefit from continued skilled PT Services at discharge to promote prior level of function and safety with additional support and return home with home health PT.    PLAN DURING HOSPITALIZATION  Nursing Mobility Recommendation : 1 Assist  PT Device Recommendation: Rolling walker  PT Treatment Plan: Bed mobility, Body mechanics, Endurance, Patient education, Energy conservation, Gait training, Strengthening, Transfer training, Balance training  Rehab Potential : Good  Frequency (Obs): 5x/week     PHYSICAL THERAPY MEDICAL/SOCIAL HISTORY     Problem List  Principal Problem:    Peripheral edema  Active Problems:    Heart murmur      HOME SITUATION  Type of Home: Independent living facility  Home Layout: One level, Elevator  Stairs to Enter : 0   Lives With: Spouse    Drives: No   Patient Regularly Uses: Glasses, Cane (uses cane inside and RW outside, has electric scooter for long distances)     SUBJECTIVE  \"My wife has been a godsend\" Agreeable to activity.     PHYSICAL THERAPY  EXAMINATION   OBJECTIVE  Precautions: None  Fall Risk: High fall risk    WEIGHT BEARING RESTRICTION  none    PAIN ASSESSMENT  Ratin    COGNITION  Overall Cognitive Status:  WFL - within functional limits    RANGE OF MOTION AND STRENGTH ASSESSMENT  Upper extremity ROM and strength are within functional limits.  Lower extremity ROM is within functional limits.  Lower extremity strength is within functional limits.    BALANCE  Static Sitting: Good  Dynamic Sitting: Fair +  Static Standing: Fair -  Dynamic Standing: Fair -    ACTIVITY TOLERANCE  Pulse: 85  Heart Rate Source: Monitor    AM-PAC '6-Clicks' INPATIENT SHORT FORM - BASIC MOBILITY  How much difficulty does the patient currently have...  Patient Difficulty: Turning over in bed (including adjusting bedclothes, sheets and blankets)?: A Little   Patient Difficulty: Sitting down on and standing up from a chair with arms (e.g., wheelchair, bedside commode, etc.): A Little   Patient Difficulty: Moving from lying on back to sitting on the side of the bed?: A Little   How much help from another person does the patient currently need...   Help from Another: Moving to and from a bed to a chair (including a wheelchair)?: A Little   Help from Another: Need to walk in hospital room?: A Little   Help from Another: Climbing 3-5 steps with a railing?: A Little     AM-PAC Score:  Raw Score: 18   Approx Degree of Impairment: 46.58%   Standardized Score (AM-PAC Scale): 43.63   CMS Modifier (G-Code): CK    FUNCTIONAL ABILITY STATUS  Functional Mobility/Gait Assessment  Gait Assistance: Contact guard assist  Distance (ft): 60  Assistive Device: Rolling walker  Pattern: Shuffle (slow pace, significant shuffle, forward flexed posture)  Sit to Stand: minimal assist on initial STS; CGA subsequently     Exercise/Education Provided:  Education Provided To: Patient     Patient Education: Role of Physical Therapy, Plan of Care, Functional Transfer Techniques, Fall Prevention,  Posture/Positioning, Energy Conservation, Proper Body Mechanics, Gait Training, DME Recommendations, Edema Reduction     Patient's Response to Education: Verbalized Understanding, Requires Further Education     Skilled Therapy Provided: Pt received sitting in chair and agreeable to activity. No c/o pain. Demos STS from chair with min A and increased effort due to low seat. Chair seat was built up in height to facilitate ease of subsequent transfer. Ambulated in hallway with RW and CGA. Demos slow and shuffled but steady gait. No LOB. Pt reported minimal fatigue with activity. Encouraged to use RW at all times for energy conservation and increased stability. Educated on techniques to reduce edema.  Pt left sitting in chair with needs within reach, handoff to RN complete.     The patient's Approx Degree of Impairment: 46.58% has been calculated based on documentation in the [facility] '6 clicks' Inpatient Basic Mobility Short Form.  Research supports that patients with this level of impairment may benefit from home with  PT.  Final disposition will be made by interdisciplinary medical team.    Patient End of Session: Up in chair, Needs met, Call light within reach, RN aware of session/findings, All patient questions and concerns addressed, Hospital anti-slip socks    CURRENT GOALS  Goals to be met by: 6/19/25  Patient Goal Patient's self-stated goal is: go home   Goal #1 Patient is able to demonstrate supine - sit EOB @ level: supervision     Goal #1   Current Status    Goal #2 Patient is able to demonstrate transfers Sit to/from Stand at assistance level: supervision with walker - rolling     Goal #2  Current Status    Goal #3 Patient is able to ambulate 150 feet with assist device: walker - rolling at assistance level: supervision   Goal #3   Current Status    Goal #4    Goal #4   Current Status    Goal #5 Patient to demonstrate independence with home activity/exercise instructions provided to patient in preparation  for discharge.   Goal #5   Current Status    Goal #6    Goal #6  Current Status      Patient Evaluation Complexity Level:  History Moderate - 1 or 2 personal factors and/or co-morbidities   Examination of body systems Moderate - addressing a total of 3 or more elements   Clinical Presentation  Moderate - Evolving   Clinical Decision Making  Moderate Complexity     Therapeutic Activity:  15 minutes

## 2025-06-12 NOTE — PROGRESS NOTES
[facility]  part of [facility]    Cardiology Progress Note    Prashanth Caceres Patient Status:  Inpatient    10/21/1940 MRN [id number]   Location [facility] 3W/SW Attending Dg Pulido MD   Hosp Day # 2 PCP TOMMIE HOLLIS MD       Impression/Plan:  84 year old male presenting with:     1) HFpEF, acute on chronic (EF 55-60% 2025)  2) AF on eliquis  3) CVA  4) HTN  5) HL  6) CKD  7) Dementia  8) Prostate ca  9) RA myxoma, 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  10) LLE cellulitis     - IV diuresis with lasix 40mg - will go to BID through today and re-assess tomorrow. He continues to be volume up.  - BMP in the afternoon before second dose of IV diuretic.  - spironolactone 25mg daily added (this was planned in the outpatient setting, however appears instructions misconstrued)  - Cont statin, beta blocker  - Cont eliquis for stroke prevention (monitor renal function, dose accordingly)  - Abx as per primary service  - Monitor on tele  - Will follow     Subjective: No events overnight.  Today patient denies chest pain, palpitations, dyspnea.    Problem List[1]    Objective:   Temp: 97.9 °F (36.6 °C)  Pulse: 55  Resp: 16  BP: 126/80    Intake/Output:     Intake/Output Summary (Last 24 hours) at 2025 1155  Last data filed at 2025 0950  Gross per 24 hour   Intake 950 ml   Output 1425 ml   Net -475 ml       Last 3 Weights   25 0502 262 lb 3.2 oz (118.9 kg)   25 0509 265 lb (120.2 kg)   06/10/25 0530 262 lb 1.6 oz (118.9 kg)   25 0152 271 lb 6.4 oz (123.1 kg)   25 2112 272 lb (123.4 kg)   23 1347 278 lb (126.1 kg)   23 2242 250 lb 12.8 oz (113.8 kg)   23 1313 270 lb (122.5 kg)         Physical Exam:    General: Alert and oriented x 3. No apparent distress. No respiratory or constitutional distress.  HEENT: Normocephalic, anicteric sclera, neck supple, no thyromegaly or adenopathy.  Neck: No JVD, carotids 2+, no bruits.  Cardiac: Regular rate and  rhythm. S1, S2 normal. No murmur, pericardial rub, S3, thrill, heave or extra cardiac sounds.  Lungs: Clear without wheezes, rales, rhonchi or dullness.  Normal excursions and effort.  Abdomen: Soft, non-tender. No organosplenomegally, mass or rebound, BS-present.  Extremities: 1-2+ pitting edema bilat Les, LLE +erythema  Neurologic: Alert and oriented, normal affect. No motor or coordinational deficit.  Skin: Warm and dry.     Laboratory/Data:    Labs:         Recent Labs   Lab 06/08/25  2148 06/10/25  0651 06/12/25  0644   WBC 8.0 7.0 7.2   HGB 15.4 16.2 16.1   MCV 96.9 93.9 95.5   .0 158.0 167.0       Recent Labs   Lab 06/08/25  2148 06/10/25  0651 06/11/25  0717 06/12/25  0644    142  --  139   K 4.1 3.4* 3.4* 3.5  3.5    103  --  101   CO2 27.0 28.0  --  29.0   BUN 21 22  --  28*   CREATSERUM 1.35* 1.49*  --  1.49*   CA 8.9 9.1  --  8.6*   * 80  --  104*       Recent Labs   Lab 06/08/25 2148   ALT 21   AST 23   ALB 3.8       No results for input(s): \"TROP\" in the last 168 hours.    Allergies:   Allergies[2]    Medications:  Current Hospital Medications[3]    Danish Boone MD  Interventional Cardiology  06/12/25         [1]   Patient Active Problem List  Diagnosis    Acute diverticulitis    Chronic kidney disease    Hypertension    Bipolar affective ([facility])    High cholesterol    ESTHER on CPAP    Dyspnea    Weight gain    Morbid obesity with BMI of 40.0-44.9, adult ([facility])    History of depression    History of stroke    Acute kidney injury    Hypokalemia    Coronary artery disease involving native coronary artery of native heart without angina pectoris    Chronic heart failure with preserved ejection fraction (HCC)    Gastrointestinal hemorrhage, unspecified gastrointestinal hemorrhage type    Blood loss anemia    Peripheral edema    Heart murmur   [2]   Allergies  Allergen Reactions    Cat Hair Extract OTHER (SEE COMMENTS)    Radiology Contrast Iodinated Dyes OTHER (SEE COMMENTS)    Other  ITCHING     Cat Dander   [3]   Current Facility-Administered Medications   Medication Dose Route Frequency    spironolactone (Aldactone) tab 25 mg  25 mg Oral Daily    metoprolol succinate ER (Toprol XL) 24 hr tab 25 mg  25 mg Oral Daily Beta Blocker    busPIRone (Buspar) tab 5 mg  5 mg Oral BID    DULoxetine (Cymbalta) DR cap 90 mg  90 mg Oral Daily    memantine (Namenda) tab 10 mg  10 mg Oral BID    rosuvastatin (Crestor) tab 20 mg  20 mg Oral Daily    oxybutynin (Ditropan) tab 5 mg  5 mg Oral BID    ondansetron (Zofran) 4 MG/2ML injection 4 mg  4 mg Intravenous Q6H PRN    acetaminophen (Tylenol) tab 650 mg  650 mg Oral Q6H PRN    hydrALAzine (Apresoline) 20 mg/mL injection 10 mg  10 mg Intravenous Q4H PRN    furosemide (Lasix) 10 mg/mL injection 40 mg  40 mg Intravenous Daily    zolpidem (Ambien) tab 5 mg  5 mg Oral Nightly PRN    alum-mag hydroxide-simethicone (Maalox) 200-200-20 MG/5ML oral suspension 30 mL  30 mL Oral QID PRN    pantoprazole (Protonix) DR tab 40 mg  40 mg Oral QAM AC    haloperidol lactate (Haldol) 5 MG/ML injection 2 mg  2 mg Intravenous Q6H PRN    ceFAZolin (Ancef) 2g in 10mL IV syringe premix  2 g Intravenous Q8H    minerin (Eucirin) cream   Topical Daily    apixaban (Eliquis) tab 5 mg  5 mg Oral BID

## 2025-06-12 NOTE — OCCUPATIONAL THERAPY NOTE
OCCUPATIONAL THERAPY EVALUATION - INPATIENT     Room Number: 322/322-A  Evaluation Date: 6/12/2025  Type of Evaluation: Initial  Presenting Problem: peripheral edema;ble swelling    Physician Order: IP Consult to Occupational Therapy  Reason for Therapy: ADL/IADL Dysfunction and Discharge Planning    OCCUPATIONAL THERAPY ASSESSMENT   Patient is a 84 year old male admitted 6/8/2025 for Ble swelling.  Prior to admission, patient was living w/ his wife at [facility]. Pt reports being mod I for ambulation w/ a cane in his apartment. Pt has an electric scooter for distance. Pt's wife assists w/ adls as needed.  Patient is currently functioning below baseline with functional mobility and adls.  Patient is requiring contact guard assist/ mod aas a result of the following impairments: decreased functional strength, decreased endurance, and ble swelling. Occupational Therapy will continue to follow for duration of hospitalization.    Patient will benefit from continued skilled OT Services at discharge to promote prior level of function and safety with additional support and return home with home health OT.    PLAN DURING HOSPITALIZATION  OT Device Recommendations: None  OT Treatment Plan: Compensatory technique education, Patient/Family training, Patient/Family education, Endurance training, Functional transfer training, Balance activities, ADL training     OCCUPATIONAL THERAPY MEDICAL/SOCIAL HISTORY   Problem List  Principal Problem:    Peripheral edema  Active Problems:    Heart murmur    HOME SITUATION  Type of Home: -- (National)  Home Layout: One level; Elevator  Lives With: Spouse  Toilet and Equipment: Comfort height toilet  Shower/Tub and Equipment: Shower chair  Other Equipment: -- (rw,can,scooter)  Occupation/Status: -- (retired)  Drives: No  Patient Regularly Uses: Glasses; Cane (uses cane inside and RW outside, has electric scooter for long distances)    Stairs in Home: none  Use of Assistive Device(s):  cane in his apartment;electric scooter for distance    Prior Level of [place]: Prior to admission, patient was living w/ his wife at [facility]. Pt reports being mod I for ambulation w/ a cane in his apartment. Pt has an electric scooter for distance. Pt's wife assists w/ adls as needed.    SUBJECTIVE  Thank you    OCCUPATIONAL THERAPY EXAMINATION      OBJECTIVE  Precautions: None  Fall Risk: High fall risk    PAIN ASSESSMENT  Ratin    ACTIVITY TOLERANCE  Fair, pt easily fatigued    O2 SATURATIONS  Activity on room air    Behavioral/Emotional/Social: cooperative, receptive to suggestions    RANGE OF MOTION   Upper extremity ROM is within functional limits     STRENGTH ASSESSMENT  Upper extremity strength is within functional limits     ACTIVITIES OF DAILY LIVING ASSESSMENT  AM-PAC ‘6-Clicks’ Inpatient Daily Activity Short Form  How much help from another person does the patient currently need…  -   Putting on and taking off regular lower body clothing?: A Lot  -   Bathing (including washing, rinsing, drying)?: A Lot  -   Toileting, which includes using toilet, bedpan or urinal? : A Little  -   Putting on and taking off regular upper body clothing?: A Little  -   Taking care of personal grooming such as brushing teeth?: None  -   Eating meals?: None    AM-PAC Score:  Score: 18  Approx Degree of Impairment: 46.65%  Standardized Score (AM-PAC Scale): 38.66  CMS Modifier (G-Code): CK    FUNCTIONAL ADL ASSESSMENT  Eating: independent  Grooming: independent  UB Dressing: min assist  LB Dressing: mod assist  Toileting: na    Skilled Therapy Provided: OT orders received and chart reviewed. RN contacted prior to start of care. Treatment coordinated w/ PT. Pt agreeable to participation in therapy. Gait belt used during dynamic activity. Pt received up in chair, alert and oriented x 4. On initial contact pt performing sit<>stand transfer from bedside chair w/ cga. Pt ambulating in the sarabia w/ rw and cga;daja  slow buut no lob noted. Recommendation made for rw for home vs cane. Use of ae for le dressing discussed    At end of session pt returned to chair and left w/ all needs in reach. RN aware of pt's status and performance in therapy      EDUCATION PROVIDED  Patient Education : Role of Occupational Therapy; Plan of Care; Functional Transfer Techniques; Fall Prevention; Energy Conservation  Patient's Response to Education: Verbalized Understanding; Returned Demonstration    The patient's Approx Degree of Impairment: 46.65% has been calculated based on documentation in the [facility] '6 clicks' Inpatient Daily Activity Short Form.  Research supports that patients with this level of impairment may benefit from return to home w/ HH.  Final disposition will be made by interdisciplinary medical team.     Patient End of Session: Up in chair, Needs met, Call light within reach, RN aware of session/findings, All patient questions and concerns addressed    OT Goals  Patients self stated goal is: to return home     Patient will complete functional transfer with supervision  Comment:     Patient will complete toileting with mod i  Comment:     Patient will tolerate standing for 5 minutes in prep for adls with supervision   Comment:    Patient will complete le dressing with supervision;ae as needed  Comment:          Goals  on: 25  Frequency: 3x/week    Patient Evaluation Complexity Level:   Occupational Profile/Medical History MODERATE - Expanded review of history including review of medical or therapy record   Specific performance deficits impacting engagement in ADL/IADL MODERATE  3 - 5 performance deficits   Client Assessment/Performance Deficits MODERATE - Comorbidities and min to mod modifications of tasks    Clinical Decision Making LOW - Analysis of occupational profile, problem-focused assessments, limited treatment options    Overall Complexity LOW     Therapeutic Activity: 15 minutes

## 2025-06-12 NOTE — CM/SW NOTE
ROXI met with pt and wife to discuss PT/OT recs, which are for HH. Wife understands and would like for pt to receive therapy in her apartment through [facility] as he has done that in the past.    ROXI contacted [facility] to ask about the process. Per liaison pt will need outpatient PT/OT eval and treat orders, [facility] physical therapist will go to pt's apartment on the independent living side to provide therapy or him. ROXI notified MD.    PLAN-[facility] w/ OP PT/OT provided through [facility]    ROXI/ROQUE to remain available for support and/or discharge planning.    MS JenniferW, LSW [id number]

## 2025-06-12 NOTE — PAYOR COMM NOTE
--------------  CONTINUED STAY REVIEW    Payor: [employer]  Subscriber #:  [id number]  Authorization Number: [id number]    Admit date: 6/10/25  Admit time:  3:07 PM    6/12/25          Assessment and Plan:     Acute on chronic diastolic heart failure  - Secondary to poor compliance with diuretics. Still with some fluid overload.  - cardiology on consult  - cont diuresis with IV lasix  - aldactone  - monitor I's and O's and daily weights.     Paroxysmal afib  H/o CVA  - cont eliquis  - cont metoprolol     LLE cellulitis  - cont IV ancef  - erythema improving     CKD stage II  Avoid nephrotoxic medications  - monitor bmp with diuresis     Hx of aortic stenosis     Bipolar mood disorder  Cont home meds     Morbid obesity with obstructive sleep apnea  BMI 41.2.  Will  patient regarding lifestyle modifications, consider CPAP at night.     Hx of PE  - cont eliquis        Legs feels swollen but better.   Temp:  [97.5 °F (36.4 °C)-98 °F (36.7 °C)] 97.9 °F (36.6 °C)  Pulse:  [55-76] 55  Resp:  [16-18] 16  BP: (109-159)/(75-83) 126/80  SpO2:  [93 %-95 %] 93 %  General:  Alert  HEENT:  Normocephalic, atraumatic  Cardiac:  Regular rate, regular rhythm  Pulmonary:  Clear to auscultation bilaterally, respirations unlabored  Gastrointestinal:  Soft, non-tender, normal bowel sounds  Musculoskeletal:  No joint swelling  Extremities:  b/l LE edema. LLE erythema improved  Neurologic:  nonfocal  Psychiatric:  Normal affect, calm and appropriate     Lab 06/08/25  2148 06/10/25  0651 06/12/25  0644   WBC 8.0 7.0 7.2   HGB 15.4 16.2 16.1   HCT 46.6 48.9 48.3   .0 158.0 167.0   RBC 4.81 5.21 5.06   MCV 96.9 93.9 95.5   MCH 32.0 31.1 31.8   MCHC 33.0 33.1 33.3   RDW 13.0 13.0 12.9   NEPRELIM 6.37 4.60 4.75      Lab 06/08/25  2148 06/10/25  0651 06/11/25  0717 06/12/25  0644   BUN 21 22  --  28*   CREATSERUM 1.35* 1.49*  --  1.49*   CA 8.9 9.1  --  8.6*   ALB 3.8  --   --   --     142  --  139   K 4.1 3.4* 3.4*  3.5  3.5    103  --  101   CO2 27.0 28.0  --  29.0   * 80  --  104*   BILT 0.5  --   --   --    AST 23  --   --   --    ALT 21  --   --   --    ALKPHO 115  --   --   --    TP 5.9  --   --   --       [Scheduled Medications]   furosemide  40 mg Intravenous BID (Diuretic)    spironolactone  25 mg Oral Daily    metoprolol succinate  25 mg Oral Daily Beta Blocker    busPIRone  5 mg Oral BID    DULoxetine  90 mg Oral Daily    memantine  10 mg Oral BID    rosuvastatin  20 mg Oral Daily    oxybutynin  5 mg Oral BID    pantoprazole  40 mg Oral QAM AC    ceFAZolin  2 g Intravenous Q8H    minerin   Topical Daily    apixaban  5 mg Oral BID                         MEDICATIONS ADMINISTERED IN LAST 1 DAY:  apixaban (Eliquis) tab 5 mg       Date Action Dose Route User    6/12/2025 0816 Given 5 mg Oral Donna Rodrigues RN    6/11/2025 2026 Given 5 mg Oral Mercedes Fried RN          busPIRone (Buspar) tab 5 mg       Date Action Dose Route User    6/12/2025 0816 Given 5 mg Oral Donna Rodrigues RN    6/11/2025 2026 Given 5 mg Oral Mercedes Fried RN          ceFAZolin (Ancef) 2g in 10mL IV syringe premix       Date Action Dose Route User    6/12/2025 1204 New Bag 2 g Intravenous Donna Rodrigues RN    6/12/2025 0437 New Bag 2 g Intravenous Mercedes Fried RN    6/11/2025 2027 New Bag 2 g Intravenous Mercedes Fried RN          furosemide (Lasix) 10 mg/mL injection 40 mg       Date Action Dose Route User    6/12/2025 0817 Given 40 mg Intravenous Donna Rodrigues RN          memantine (Namenda) tab 10 mg       Date Action Dose Route User    6/12/2025 0816 Given 10 mg Oral Donna Rodrigues RN    6/11/2025 2026 Given 10 mg Oral Mercedes Fried RN          metoprolol succinate ER (Toprol XL) 24 hr tab 25 mg       Date Action Dose Route User    6/12/2025 0555 Given 25 mg Oral Corky, Mercedes, RN          minerin (Eucirin) cream       Date Action Dose Route User    6/12/2025 0816 Given (none) Topical Donna Rodrigues, RN           oxybutynin (Ditropan) tab 5 mg       Date Action Dose Route User    6/12/2025 0817 Given 5 mg Oral Donna Rodrigues RN    6/11/2025 2026 Given 5 mg Oral Mercedes Fried RN          pantoprazole (Protonix) DR tab 40 mg       Date Action Dose Route User    6/12/2025 0555 Given 40 mg Oral Mercedes Fried RN          rosuvastatin (Crestor) tab 20 mg       Date Action Dose Route User    6/12/2025 0817 Given 20 mg Oral Donna Rodrigues RN          spironolactone (Aldactone) tab 25 mg       Date Action Dose Route User    6/12/2025 0817 Given 25 mg Oral Donna Rodrigues RN          DULoxetine (Cymbalta) DR cap 90 mg       Date Action Dose Route User    6/12/2025 0817 Given 90 mg Oral Donna Rodrigues RN            Vitals (last day)       Date/Time Temp Pulse Resp BP SpO2 Weight O2 Device O2 Flow Rate (L/min) [facility]    06/12/25 1058 -- 85 -- -- -- -- -- -- DP    06/12/25 0815 97.9 °F (36.6 °C) 55 16 126/80 93 % -- None (Room air) -- DAVID    06/12/25 0554 97.6 °F (36.4 °C) 62 18 159/83 93 % -- None (Room air) -- IM    06/12/25 0502 -- -- -- -- -- 262 lb 3.2 oz (118.9 kg) -- -- GG    06/12/25 0300 -- 65 -- -- -- -- -- -- GT    06/11/25 2021 98 °F (36.7 °C) 76 18 109/81 95 % -- None (Room air) -- IM    06/11/25 0509 98 °F (36.7 °C) 72 16 106/80 93 % -- None (Room air) -- SW

## 2025-06-12 NOTE — PROGRESS NOTES
[facility]  Progress Note     Prashanth Caceres  : 10/21/1940    Status: Inpatient  Day #: 2    Attending: Dg Pulido MD  PCP: TOMMIE HOLLIS MD     Assessment and Plan:    Acute on chronic diastolic heart failure  - Secondary to poor compliance with diuretics. Still with some fluid overload.  - cardiology on consult  - cont diuresis with IV lasix  - aldactone  - monitor I's and O's and daily weights.    Paroxysmal afib  H/o CVA  - cont eliquis  - cont metoprolol     LLE cellulitis  - cont IV ancef  - erythema improving     CKD stage II  Avoid nephrotoxic medications  - monitor bmp with diuresis     Hx of aortic stenosis     Bipolar mood disorder  Cont home meds     Morbid obesity with obstructive sleep apnea  BMI 41.2.  Will  patient regarding lifestyle modifications, consider CPAP at night.     Hx of PE  - cont eliquis    Dispo:  PT/OT eval pending. SW following.         DVT Mechanical Prophylaxis:        DVT Pharmacologic Prophylaxis   Medication    apixaban (Eliquis) tab 5 mg               Subjective:      Initial Chief Complaint:  edema    Legs feels swollen but better. No SOB at rest.    10 point ROS completed and was negative, except for pertinent positive and negatives stated in subjective.      Objective:      Temp:  [97.5 °F (36.4 °C)-98 °F (36.7 °C)] 97.9 °F (36.6 °C)  Pulse:  [55-76] 55  Resp:  [16-18] 16  BP: (109-159)/(75-83) 126/80  SpO2:  [93 %-95 %] 93 %  General:  Alert, no distress  HEENT:  Normocephalic, atraumatic  Cardiac:  Regular rate, regular rhythm  Pulmonary:  Clear to auscultation bilaterally, respirations unlabored  Gastrointestinal:  Soft, non-tender, normal bowel sounds  Musculoskeletal:  No joint swelling  Extremities:  b/l LE edema. LLE erythema improved  Neurologic:  nonfocal  Psychiatric:  Normal affect, calm and appropriate    Intake/Output Summary (Last 24 hours) at 2025 1308  Last data filed at 2025 0950  Gross per 24 hour   Intake 950 ml   Output  1425 ml   Net -475 ml         Recent Labs   Lab 06/08/25  2148 06/10/25  0651 06/12/25  0644   WBC 8.0 7.0 7.2   HGB 15.4 16.2 16.1   HCT 46.6 48.9 48.3   .0 158.0 167.0   RBC 4.81 5.21 5.06   MCV 96.9 93.9 95.5   MCH 32.0 31.1 31.8   MCHC 33.0 33.1 33.3   RDW 13.0 13.0 12.9   NEPRELIM 6.37 4.60 4.75     Recent Labs   Lab 06/08/25  2148 06/10/25  0651 06/11/25  0717 06/12/25  0644   BUN 21 22  --  28*   CREATSERUM 1.35* 1.49*  --  1.49*   CA 8.9 9.1  --  8.6*   ALB 3.8  --   --   --     142  --  139   K 4.1 3.4* 3.4* 3.5  3.5    103  --  101   CO2 27.0 28.0  --  29.0   * 80  --  104*   BILT 0.5  --   --   --    AST 23  --   --   --    ALT 21  --   --   --    ALKPHO 115  --   --   --    TP 5.9  --   --   --        No results found.      Medications:  Scheduled Medications[1]   PRN Meds: PRN Medications[2]    Supplementary Documentation:   DVT Mechanical Prophylaxis:        DVT Pharmacologic Prophylaxis   Medication    apixaban (Eliquis) tab 5 mg                Code Status: Prior  Naranjo: External urinary catheter in place  [place] Duration (in days):   Central line:    DONTAE: 6/13/2025        **Certification      PHYSICIAN Certification of Need for Inpatient Hospitalization - Initial Certification    Patient will require inpatient services that will reasonably be expected to span two midnight's based on the clinical documentation in H+P.   Based on patients current state of illness, I anticipate that, after discharge, patient will require TBD.            MDM High. Time spent on chart/note review, review of labs/imaging, discussion with patient, physical exam, discussion with staff, consultants, coordinating care, writing progress note, discussion of plan of care.      Dg Pulido MD         [1]    furosemide  40 mg Intravenous BID (Diuretic)    spironolactone  25 mg Oral Daily    metoprolol succinate  25 mg Oral Daily Beta Blocker    busPIRone  5 mg Oral BID    DULoxetine  90 mg Oral Daily     memantine  10 mg Oral BID    rosuvastatin  20 mg Oral Daily    oxybutynin  5 mg Oral BID    pantoprazole  40 mg Oral QAM AC    ceFAZolin  2 g Intravenous Q8H    minerin   Topical Daily    apixaban  5 mg Oral BID   [2]   ondansetron    acetaminophen    hydrALAzine    zolpidem    alum-mag hydroxide-simethicone    haloperidol lactate

## 2025-06-12 NOTE — PLAN OF CARE
Patient alert and oriented x 4 on room air. Patient denies pain. IV lasix and antibiotics continued. Patient seen by PT/OT. Call light within reach and safety precautions.     Problem: Patient Centered Care  Goal: Patient preferences are identified and integrated in the patient's plan of care  Description: Interventions:  - What would you like us to know as we care for you? Per wife, patient is not allowed dairy as per GI MD  - Provide timely, complete, and accurate information to patient/family  - Incorporate patient and family knowledge, values, beliefs, and cultural backgrounds into the planning and delivery of care  - Encourage patient/family to participate in care and decision-making at the level they choose  - Honor patient and family perspectives and choices  Outcome: Progressing     Problem: Patient/Family Goals  Goal: Patient/Family Long Term Goal  Description: Patient's Long Term Goal: ensure medications work well and don't need to be changed by MD    Interventions:  - monitor new medications and give education  - ensure patient and wife understand importance of diet and fluid intake  - See additional Care Plan goals for specific interventions  Outcome: Progressing  Goal: Patient/Family Short Term Goal  Description: Patient's Short Term Goal: get rid of leg swelling    Interventions:   - monitor labs  - monitor intake and output  - diuretic medication as per MD order  - See additional Care Plan goals for specific interventions  Outcome: Progressing     Problem: CARDIOVASCULAR - ADULT  Goal: Maintains optimal cardiac output and hemodynamic stability  Description: INTERVENTIONS:  - Monitor vital signs, rhythm, and trends  - Monitor for bleeding, hypotension and signs of decreased cardiac output  - Evaluate effectiveness of vasoactive medications to optimize hemodynamic stability  - Monitor arterial and/or venous puncture sites for bleeding and/or hematoma  - Assess quality of pulses, skin color and  temperature  - Assess for signs of decreased coronary artery perfusion - ex. Angina  - Evaluate fluid balance, assess for edema, trend weights  Outcome: Progressing  Goal: Absence of cardiac arrhythmias or at baseline  Description: INTERVENTIONS:  - Continuous cardiac monitoring, monitor vital signs, obtain 12 lead EKG if indicated  - Evaluate effectiveness of antiarrhythmic and heart rate control medications as ordered  - Initiate emergency measures for life threatening arrhythmias  - Monitor electrolytes and administer replacement therapy as ordered  Outcome: Progressing     Problem: RESPIRATORY - ADULT  Goal: Achieves optimal ventilation and oxygenation  Description: INTERVENTIONS:  - Assess for changes in respiratory status  - Assess for changes in mentation and behavior  - Position to facilitate oxygenation and minimize respiratory effort  - Oxygen supplementation based on oxygen saturation or ABGs  - Provide Smoking Cessation handout, if applicable  - Encourage broncho-pulmonary hygiene including cough, deep breathe, Incentive Spirometry  - Assess the need for suctioning and perform as needed  - Assess and instruct to report SOB or any respiratory difficulty  - Respiratory Therapy support as indicated  - Manage/alleviate anxiety  - Monitor for signs/symptoms of CO2 retention  Outcome: Progressing     Problem: METABOLIC/FLUID AND ELECTROLYTES - ADULT  Goal: Electrolytes maintained within normal limits  Description: INTERVENTIONS:  - Monitor labs and rhythm and assess patient for signs and symptoms of electrolyte imbalances  - Administer electrolyte replacement as ordered  - Monitor response to electrolyte replacements, including rhythm and repeat lab results as appropriate  - Fluid restriction as ordered  - Instruct patient on fluid and nutrition restrictions as appropriate  Outcome: Progressing  Goal: Hemodynamic stability and optimal renal function maintained  Description: INTERVENTIONS:  - Monitor labs and  assess for signs and symptoms of volume excess or deficit  - Monitor intake, output and patient weight  - Monitor urine specific gravity, serum osmolarity and serum sodium as indicated or ordered  - Monitor response to interventions for patient's volume status, including labs, urine output, blood pressure (other measures as available)  - Encourage oral intake as appropriate  - Instruct patient on fluid and nutrition restrictions as appropriate  Outcome: Progressing

## 2025-06-13 LAB
ANION GAP SERPL CALC-SCNC: 10 MMOL/L (ref 0–18)
BUN BLD-MCNC: 31 MG/DL (ref 9–23)
BUN/CREAT SERPL: 19.1 (ref 10–20)
CALCIUM BLD-MCNC: 8.7 MG/DL (ref 8.7–10.4)
CHLORIDE SERPL-SCNC: 101 MMOL/L (ref 98–112)
CO2 SERPL-SCNC: 30 MMOL/L (ref 21–32)
CREAT BLD-MCNC: 1.62 MG/DL (ref 0.7–1.3)
EGFRCR SERPLBLD CKD-EPI 2021: 42 ML/MIN/1.73M2 (ref 60–?)
GLUCOSE BLD-MCNC: 103 MG/DL (ref 70–99)
OSMOLALITY SERPL CALC.SUM OF ELEC: 299 MOSM/KG (ref 275–295)
POTASSIUM SERPL-SCNC: 3.7 MMOL/L (ref 3.5–5.1)
SODIUM SERPL-SCNC: 141 MMOL/L (ref 136–145)

## 2025-06-13 PROCEDURE — 99233 SBSQ HOSP IP/OBS HIGH 50: CPT | Performed by: HOSPITALIST

## 2025-06-13 RX ORDER — FUROSEMIDE 10 MG/ML
40 INJECTION INTRAMUSCULAR; INTRAVENOUS DAILY
Status: DISCONTINUED | OUTPATIENT
Start: 2025-06-13 | End: 2025-06-14

## 2025-06-13 NOTE — PAYOR COMM NOTE
--------------  CONTINUED STAY REVIEW    Payor: [employer]  Subscriber #:  [id number]  Authorization Number: [id number]    Admit date: 6/10/25  Admit time:  3:07 PM    6/13/25           Assessment and Plan:     Acute on chronic diastolic heart failure  - Secondary to poor compliance with diuretics. Still with some fluid overload.  - cardiology on consult  - cont diuresis with IV lasix - dose decreased to once daily, hopefully transition to oral soon.  - cont aldactone (new medication)  - monitor I's and O's and daily weights. Net negative 3.6L since admission     Paroxysmal afib  H/o CVA  - cont eliquis  - cont metoprolol     LLE cellulitis  - cont IV ancef  - erythema improving     CKD stage II  Avoid nephrotoxic medications  - monitor bmp with diuresis -stable today     Hx of aortic stenosis     Morbid obesity with obstructive sleep apnea  BMI 41.2.       Hx of PE  - cont eliquis     Subjective:     Legs feels swollen but better. No SOB at rest.     Temp:  [97.8 °F (36.6 °C)-98.4 °F (36.9 °C)] 98.3 °F (36.8 °C)  Pulse:  [59-76] 59  Resp:  [17-18] 18  BP: (103-125)/(59-74) 106/65  SpO2:  [90 %-93 %] 90 %  General:  Alert  HEENT:  Normocephalic, atraumatic  Cardiac:  Regular rate, regular rhythm  Pulmonary:  Clear to auscultation bilaterally, respirations unlabored  Gastrointestinal:  Soft, non-tender, normal bowel sounds  Musculoskeletal:  No joint swelling  Extremities:  b/l LE edema. LLE erythema improved  Neurologic:  nonfocal  Psychiatric:  Normal affect, calm and appropriate      Lab 06/08/25  2148 06/10/25  0651 06/12/25  0644   WBC 8.0 7.0 7.2   HGB 15.4 16.2 16.1   HCT 46.6 48.9 48.3   .0 158.0 167.0   RBC 4.81 5.21 5.06   MCV 96.9 93.9 95.5   MCH 32.0 31.1 31.8   MCHC 33.0 33.1 33.3   RDW 13.0 13.0 12.9   NEPRELIM 6.37 4.60 4.75      Lab 06/08/25  2148 06/12/25  0644 06/12/25  1446 06/13/25  0633   BUN 21 28* 29* 31*   CREATSERUM 1.35* 1.49* 1.60* 1.62*   CA 8.9 8.6* 9.1 8.7   ALB 3.8  --    --   --     139 139 141   K 4.1 3.5  3.5 4.1 3.7    101 103 101   CO2 27.0 29.0 28.0 30.0   * 104* 92 103*   BILT 0.5  --   --   --    AST 23  --   --   --    ALT 21  --   --   --    ALKPHO 115  --   --   --    TP 5.9  --   --   --    [Scheduled Medications]   furosemide  40 mg Intravenous Daily    spironolactone  25 mg Oral Daily    metoprolol succinate  25 mg Oral Daily Beta Blocker    busPIRone  5 mg Oral BID    DULoxetine  90 mg Oral Daily    memantine  10 mg Oral BID    rosuvastatin  20 mg Oral Daily    oxybutynin  5 mg Oral BID    pantoprazole  40 mg Oral QAM AC    ceFAZolin  2 g Intravenous Q8H    minerin   Topical Daily    apixaban  5 mg Oral BID                      CARDIOLOGY  Impression/Plan:  84 year old male presenting with:     1) HFpEF, acute on chronic (EF 55-60% 5/2025)  2) AF on eliquis  3) CVA  4) HTN, controlled  5) HL, on statin  6) CKD  7) Dementia  8) Prostate ca  9) RA myxoma, 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  10) LLE cellulitis     - IV diuresis with lasix, decrease back to daily dosing.   - await today's labs.  - spironolactone 25mg daily added (this was planned in the outpatient setting, however appears instructions misconstrued)  - Cont statin, beta blocker  - Cont eliquis for stroke prevention (monitor renal function, dose accordingly)  - Abx as per primary service  - Monitor on tele    MEDICATIONS ADMINISTERED IN LAST 1 DAY:  apixaban (Eliquis) tab 5 mg       Date Action Dose Route User    6/13/2025 0821 Given 5 mg Oral Jayleen Chopra RN    6/12/2025 2015 Given 5 mg Oral Mercedes Fried RN          busPIRone (Buspar) tab 5 mg       Date Action Dose Route User    6/13/2025 0821 Given 5 mg Oral Jayleen Chopra RN    6/12/2025 2015 Given 5 mg Oral Mercedes Fried RN          ceFAZolin (Ancef) 2g in 10mL IV syringe premix       Date Action Dose Route User    6/13/2025 1156 New Bag 2 g Intravenous Jayleen Chopra RN    6/13/2025 0434 New Bag 2 g Intravenous  Mercedes Fried RN    6/12/2025 2018 New Bag 2 g Intravenous Mercedes Fried RN          furosemide (Lasix) 10 mg/mL injection 40 mg       Date Action Dose Route User    6/12/2025 1603 Given 40 mg Intravenous Donna Rodrigues RN          furosemide (Lasix) 10 mg/mL injection 40 mg       Date Action Dose Route User    6/13/2025 0822 Given 40 mg Intravenous Jayleen Chopra RN          memantine (Namenda) tab 10 mg       Date Action Dose Route User    6/13/2025 0821 Given 10 mg Oral Jayleen Chopra RN    6/12/2025 2015 Given 10 mg Oral Mercedes Fried RN          metoprolol succinate ER (Toprol XL) 24 hr tab 25 mg       Date Action Dose Route User    6/13/2025 0639 Given 25 mg Oral Mercedes Fried RN          minerin (Eucirin) cream       Date Action Dose Route User    6/13/2025 0823 Given (none) Topical Jayleen Chopra RN          oxybutynin (Ditropan) tab 5 mg       Date Action Dose Route User    6/13/2025 0821 Given 5 mg Oral Jayleen Chopra RN    6/12/2025 2015 Given 5 mg Oral Mercedes Fried RN          pantoprazole (Protonix) DR tab 40 mg       Date Action Dose Route User    6/13/2025 0639 Given 40 mg Oral Mercedes Fried RN          rosuvastatin (Crestor) tab 20 mg       Date Action Dose Route User    6/13/2025 0821 Given 20 mg Oral Jayleen Chopra RN          spironolactone (Aldactone) tab 25 mg       Date Action Dose Route User    6/13/2025 0822 Given 25 mg Oral Jayleen Chopra RN          DULoxetine (Cymbalta) DR cap 90 mg       Date Action Dose Route User    6/13/2025 0821 Given 90 mg Oral Jayleen Chopra RN            Vitals (last day)       Date/Time Temp Pulse Resp BP SpO2 Weight O2 Device O2 Flow Rate (L/min) Who    06/13/25 0818 98.3 °F (36.8 °C) 59 18 106/65 90 % -- None (Room air) -- SY    06/13/25 0638 97.8 °F (36.6 °C) 68 18 125/74 92 % -- None (Room air) -- IM    06/13/25 0438 -- -- -- -- -- 261 lb 14.4 oz (118.8 kg) -- -- AA    06/12/25 2013 98.4 °F (36.9 °C) 69 18 115/72 92 % -- None (Room air)  -- IM    06/12/25 0815 97.9 °F (36.6 °C) 55 16 126/80 93 % -- None (Room air) -- DAVID    06/12/25 0554 97.6 °F (36.4 °C) 62 18 159/83 93 % -- None (Room air) -- IM

## 2025-06-13 NOTE — CONGREGATE LIVING REVIEW
Congregate Living Authorization    The [facility] ([facility]) has reviewed this case and the committee DOES NOT RECOMMEND discharge to a skilled nursing facility under skilled care.    The CLRC recommends:  Home with home health and any other appropriate caregiver assistance or medical equipment as needed vs respite in SNF.     Does not appear to have skilled services for NICKIE, but additional home support appears to be needed.    For questions regarding CLRC approval process, please contact the CM assigned to the case.  For questions regarding RN discharge workflow, please contact the unit Clinical Leader.

## 2025-06-13 NOTE — PLAN OF CARE
Patient alert and oriented. IV antibiotics and IV lasix continued. Up x1 with walker. Fall precautions in place.    Problem: CARDIOVASCULAR - ADULT  Goal: Maintains optimal cardiac output and hemodynamic stability  Description: INTERVENTIONS:  - Monitor vital signs, rhythm, and trends  - Monitor for bleeding, hypotension and signs of decreased cardiac output  - Evaluate effectiveness of vasoactive medications to optimize hemodynamic stability  - Monitor arterial and/or venous puncture sites for bleeding and/or hematoma  - Assess quality of pulses, skin color and temperature  - Assess for signs of decreased coronary artery perfusion - ex. Angina  - Evaluate fluid balance, assess for edema, trend weights  Outcome: Progressing  Goal: Absence of cardiac arrhythmias or at baseline  Description: INTERVENTIONS:  - Continuous cardiac monitoring, monitor vital signs, obtain 12 lead EKG if indicated  - Evaluate effectiveness of antiarrhythmic and heart rate control medications as ordered  - Initiate emergency measures for life threatening arrhythmias  - Monitor electrolytes and administer replacement therapy as ordered  Outcome: Progressing     Problem: RESPIRATORY - ADULT  Goal: Achieves optimal ventilation and oxygenation  Description: INTERVENTIONS:  - Assess for changes in respiratory status  - Assess for changes in mentation and behavior  - Position to facilitate oxygenation and minimize respiratory effort  - Oxygen supplementation based on oxygen saturation or ABGs  - Provide Smoking Cessation handout, if applicable  - Encourage broncho-pulmonary hygiene including cough, deep breathe, Incentive Spirometry  - Assess the need for suctioning and perform as needed  - Assess and instruct to report SOB or any respiratory difficulty  - Respiratory Therapy support as indicated  - Manage/alleviate anxiety  - Monitor for signs/symptoms of CO2 retention  Outcome: Progressing     Problem: METABOLIC/FLUID AND ELECTROLYTES -  ADULT  Goal: Electrolytes maintained within normal limits  Description: INTERVENTIONS:  - Monitor labs and rhythm and assess patient for signs and symptoms of electrolyte imbalances  - Administer electrolyte replacement as ordered  - Monitor response to electrolyte replacements, including rhythm and repeat lab results as appropriate  - Fluid restriction as ordered  - Instruct patient on fluid and nutrition restrictions as appropriate  Outcome: Progressing  Goal: Hemodynamic stability and optimal renal function maintained  Description: INTERVENTIONS:  - Monitor labs and assess for signs and symptoms of volume excess or deficit  - Monitor intake, output and patient weight  - Monitor urine specific gravity, serum osmolarity and serum sodium as indicated or ordered  - Monitor response to interventions for patient's volume status, including labs, urine output, blood pressure (other measures as available)  - Encourage oral intake as appropriate  - Instruct patient on fluid and nutrition restrictions as appropriate  Outcome: Progressing     Problem: Patient/Family Goals  Goal: Patient/Family Long Term Goal  Description: Patient's Long Term Goal: ensure medications work well and don't need to be changed by MD    Interventions:  - monitor new medications and give education  - ensure patient and wife understand importance of diet and fluid intake  - See additional Care Plan goals for specific interventions  Outcome: Progressing  Goal: Patient/Family Short Term Goal  Description: Patient's Short Term Goal: get rid of leg swelling    Interventions:   - monitor labs  - monitor intake and output  - diuretic medication as per MD order  - See additional Care Plan goals for specific interventions  Outcome: Progressing     Problem: Patient Centered Care  Goal: Patient preferences are identified and integrated in the patient's plan of care  Description: Interventions:  - What would you like us to know as we care for you? Per wife,  patient is not allowed dairy as per GI MD  - Provide timely, complete, and accurate information to patient/family  - Incorporate patient and family knowledge, values, beliefs, and cultural backgrounds into the planning and delivery of care  - Encourage patient/family to participate in care and decision-making at the level they choose  - Honor patient and family perspectives and choices  Outcome: Progressing     Problem: METABOLIC/FLUID AND ELECTROLYTES - ADULT  Goal: Electrolytes maintained within normal limits  Description: INTERVENTIONS:  - Monitor labs and rhythm and assess patient for signs and symptoms of electrolyte imbalances  - Administer electrolyte replacement as ordered  - Monitor response to electrolyte replacements, including rhythm and repeat lab results as appropriate  - Fluid restriction as ordered  - Instruct patient on fluid and nutrition restrictions as appropriate  Outcome: Progressing  Goal: Hemodynamic stability and optimal renal function maintained  Description: INTERVENTIONS:  - Monitor labs and assess for signs and symptoms of volume excess or deficit  - Monitor intake, output and patient weight  - Monitor urine specific gravity, serum osmolarity and serum sodium as indicated or ordered  - Monitor response to interventions for patient's volume status, including labs, urine output, blood pressure (other measures as available)  - Encourage oral intake as appropriate  - Instruct patient on fluid and nutrition restrictions as appropriate  Outcome: Progressing

## 2025-06-13 NOTE — PROGRESS NOTES
[facility]  Progress Note     Prashanth Caceres  : 10/21/1940    Status: Inpatient  Day #: 3    Attending: Dg Pulido MD  PCP: TOMMIE HOLLIS MD     Assessment and Plan:    Acute on chronic diastolic heart failure  - Secondary to poor compliance with diuretics. Still with some fluid overload.  - cardiology on consult  - cont diuresis with IV lasix - dose decreased to once daily, hopefully transition to oral soon.  - cont aldactone (new medication)  - monitor I's and O's and daily weights. Net negative 3.6L since admission    Paroxysmal afib  H/o CVA  - cont eliquis  - cont metoprolol     LLE cellulitis  - cont IV ancef  - erythema improving     CKD stage II  Avoid nephrotoxic medications  - monitor bmp with diuresis -stable today     Hx of aortic stenosis     Bipolar mood disorder  Cont home meds     Morbid obesity with obstructive sleep apnea  BMI 41.2.       Hx of PE  - cont eliquis    Dispo:  PT/OT. Plans for onsite PT at patient's ILF apartment at [facility]        DVT Mechanical Prophylaxis:        DVT Pharmacologic Prophylaxis   Medication    apixaban (Eliquis) tab 5 mg               Subjective:      Initial Chief Complaint:  edema    Legs feels swollen but better. No SOB at rest.    10 point ROS completed and was negative, except for pertinent positive and negatives stated in subjective.      Objective:      Temp:  [97.8 °F (36.6 °C)-98.4 °F (36.9 °C)] 98.3 °F (36.8 °C)  Pulse:  [59-76] 59  Resp:  [17-18] 18  BP: (103-125)/(59-74) 106/65  SpO2:  [90 %-93 %] 90 %  General:  Alert, no distress  HEENT:  Normocephalic, atraumatic  Cardiac:  Regular rate, regular rhythm  Pulmonary:  Clear to auscultation bilaterally, respirations unlabored  Gastrointestinal:  Soft, non-tender, normal bowel sounds  Musculoskeletal:  No joint swelling  Extremities:  b/l LE edema. LLE erythema improved  Neurologic:  nonfocal  Psychiatric:  Normal affect, calm and appropriate    Intake/Output Summary (Last 24 hours) at  6/13/2025 1412  Last data filed at 6/13/2025 1045  Gross per 24 hour   Intake 870 ml   Output 550 ml   Net 320 ml         Recent Labs   Lab 06/08/25  2148 06/10/25  0651 06/12/25  0644   WBC 8.0 7.0 7.2   HGB 15.4 16.2 16.1   HCT 46.6 48.9 48.3   .0 158.0 167.0   RBC 4.81 5.21 5.06   MCV 96.9 93.9 95.5   MCH 32.0 31.1 31.8   MCHC 33.0 33.1 33.3   RDW 13.0 13.0 12.9   NEPRELIM 6.37 4.60 4.75     Recent Labs   Lab 06/08/25  2148 06/10/25  0651 06/12/25  0644 06/12/25  1446 06/13/25  0633   BUN 21   < > 28* 29* 31*   CREATSERUM 1.35*   < > 1.49* 1.60* 1.62*   CA 8.9   < > 8.6* 9.1 8.7   ALB 3.8  --   --   --   --       < > 139 139 141   K 4.1   < > 3.5  3.5 4.1 3.7      < > 101 103 101   CO2 27.0   < > 29.0 28.0 30.0   *   < > 104* 92 103*   BILT 0.5  --   --   --   --    AST 23  --   --   --   --    ALT 21  --   --   --   --    ALKPHO 115  --   --   --   --    TP 5.9  --   --   --   --     < > = values in this interval not displayed.       No results found.      Medications:  Scheduled Medications[1]   PRN Meds: PRN Medications[2]    Supplementary Documentation:   DVT Mechanical Prophylaxis:        DVT Pharmacologic Prophylaxis   Medication    apixaban (Eliquis) tab 5 mg                Code Status: Prior  Naranjo: External urinary catheter in place  Naranjo Duration (in days):   Central line:    DONTAE: 6/14/2025        **Certification      PHYSICIAN Certification of Need for Inpatient Hospitalization - Initial Certification    Patient will require inpatient services that will reasonably be expected to span two midnight's based on the clinical documentation in H+P.   Based on patients current state of illness, I anticipate that, after discharge, patient will require TBD.            MDM High. Time spent on chart/note review, review of labs/imaging, discussion with patient, physical exam, discussion with staff, consultants, coordinating care, writing progress note, discussion of plan of  care.      Dg Pulido MD         [1]    furosemide  40 mg Intravenous Daily    spironolactone  25 mg Oral Daily    metoprolol succinate  25 mg Oral Daily Beta Blocker    busPIRone  5 mg Oral BID    DULoxetine  90 mg Oral Daily    memantine  10 mg Oral BID    rosuvastatin  20 mg Oral Daily    oxybutynin  5 mg Oral BID    pantoprazole  40 mg Oral QAM AC    ceFAZolin  2 g Intravenous Q8H    minerin   Topical Daily    apixaban  5 mg Oral BID   [2]   ondansetron    acetaminophen    hydrALAzine    zolpidem    alum-mag hydroxide-simethicone    haloperidol lactate

## 2025-06-13 NOTE — CM/SW NOTE
Per chart, plan for onsite PT at pt's [facility] apt w/ [place].    They will need MD to enter order/Rx for Outpatient PT/OT eval & treat prior to pt's Discharge.    PLAN: [facility] w/ Onsite/Outpatient PT/OT - pending Rx & med clear      SW/CM to remain available for support and/or discharge planning.       MS SuW, LSW [id number]

## 2025-06-13 NOTE — PLAN OF CARE
AxOx4 forgetful at times, RA, NSR, Cont with accidents, x1 walker.   PLAN: IV lasix daily, IV abx. Needs outpatient PT/OT scripts   Problem: Patient Centered Care  Goal: Patient preferences are identified and integrated in the patient's plan of care  Description: Interventions:  - What would you like us to know as we care for you? Per wife, patient is not allowed dairy as per GI MD  - Provide timely, complete, and accurate information to patient/family  - Incorporate patient and family knowledge, values, beliefs, and cultural backgrounds into the planning and delivery of care  - Encourage patient/family to participate in care and decision-making at the level they choose  - Honor patient and family perspectives and choices  Outcome: Progressing     Problem: Patient/Family Goals  Goal: Patient/Family Long Term Goal  Description: Patient's Long Term Goal: ensure medications work well and don't need to be changed by MD    Interventions:  - monitor new medications and give education  - ensure patient and wife understand importance of diet and fluid intake  - See additional Care Plan goals for specific interventions  Outcome: Progressing  Goal: Patient/Family Short Term Goal  Description: Patient's Short Term Goal: get rid of leg swelling    Interventions:   - monitor labs  - monitor intake and output  - diuretic medication as per MD order  - See additional Care Plan goals for specific interventions  Outcome: Progressing     Problem: CARDIOVASCULAR - ADULT  Goal: Maintains optimal cardiac output and hemodynamic stability  Description: INTERVENTIONS:  - Monitor vital signs, rhythm, and trends  - Monitor for bleeding, hypotension and signs of decreased cardiac output  - Evaluate effectiveness of vasoactive medications to optimize hemodynamic stability  - Monitor arterial and/or venous puncture sites for bleeding and/or hematoma  - Assess quality of pulses, skin color and temperature  - Assess for signs of decreased coronary  artery perfusion - ex. Angina  - Evaluate fluid balance, assess for edema, trend weights  Outcome: Progressing  Goal: Absence of cardiac arrhythmias or at baseline  Description: INTERVENTIONS:  - Continuous cardiac monitoring, monitor vital signs, obtain 12 lead EKG if indicated  - Evaluate effectiveness of antiarrhythmic and heart rate control medications as ordered  - Initiate emergency measures for life threatening arrhythmias  - Monitor electrolytes and administer replacement therapy as ordered  Outcome: Progressing     Problem: RESPIRATORY - ADULT  Goal: Achieves optimal ventilation and oxygenation  Description: INTERVENTIONS:  - Assess for changes in respiratory status  - Assess for changes in mentation and behavior  - Position to facilitate oxygenation and minimize respiratory effort  - Oxygen supplementation based on oxygen saturation or ABGs  - Provide Smoking Cessation handout, if applicable  - Encourage broncho-pulmonary hygiene including cough, deep breathe, Incentive Spirometry  - Assess the need for suctioning and perform as needed  - Assess and instruct to report SOB or any respiratory difficulty  - Respiratory Therapy support as indicated  - Manage/alleviate anxiety  - Monitor for signs/symptoms of CO2 retention  Outcome: Progressing     Problem: RESPIRATORY - ADULT  Goal: Achieves optimal ventilation and oxygenation  Description: INTERVENTIONS:  - Assess for changes in respiratory status  - Assess for changes in mentation and behavior  - Position to facilitate oxygenation and minimize respiratory effort  - Oxygen supplementation based on oxygen saturation or ABGs  - Provide Smoking Cessation handout, if applicable  - Encourage broncho-pulmonary hygiene including cough, deep breathe, Incentive Spirometry  - Assess the need for suctioning and perform as needed  - Assess and instruct to report SOB or any respiratory difficulty  - Respiratory Therapy support as indicated  - Manage/alleviate anxiety  -  Monitor for signs/symptoms of CO2 retention  Outcome: Progressing     Problem: RESPIRATORY - ADULT  Goal: Achieves optimal ventilation and oxygenation  Description: INTERVENTIONS:  - Assess for changes in respiratory status  - Assess for changes in mentation and behavior  - Position to facilitate oxygenation and minimize respiratory effort  - Oxygen supplementation based on oxygen saturation or ABGs  - Provide Smoking Cessation handout, if applicable  - Encourage broncho-pulmonary hygiene including cough, deep breathe, Incentive Spirometry  - Assess the need for suctioning and perform as needed  - Assess and instruct to report SOB or any respiratory difficulty  - Respiratory Therapy support as indicated  - Manage/alleviate anxiety  - Monitor for signs/symptoms of CO2 retention  Outcome: Progressing     Problem: METABOLIC/FLUID AND ELECTROLYTES - ADULT  Goal: Electrolytes maintained within normal limits  Description: INTERVENTIONS:  - Monitor labs and rhythm and assess patient for signs and symptoms of electrolyte imbalances  - Administer electrolyte replacement as ordered  - Monitor response to electrolyte replacements, including rhythm and repeat lab results as appropriate  - Fluid restriction as ordered  - Instruct patient on fluid and nutrition restrictions as appropriate  Outcome: Progressing  Goal: Hemodynamic stability and optimal renal function maintained  Description: INTERVENTIONS:  - Monitor labs and assess for signs and symptoms of volume excess or deficit  - Monitor intake, output and patient weight  - Monitor urine specific gravity, serum osmolarity and serum sodium as indicated or ordered  - Monitor response to interventions for patient's volume status, including labs, urine output, blood pressure (other measures as available)  - Encourage oral intake as appropriate  - Instruct patient on fluid and nutrition restrictions as appropriate  Outcome: Progressing

## 2025-06-13 NOTE — PROGRESS NOTES
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   - await today's labs.  - spironolactone 25mg daily added (this was planned in the outpatient setting, however appears instructions misconstrued)  - Cont statin, beta blocker  - Cont eliquis for stroke prevention (monitor renal function, dose accordingly)  - Abx as per primary service  - Monitor on tele       Subjective:     No chest pain or dyspnea.         Problem List[1]    Objective:   Temp: 97.8 °F (36.6 °C)  Pulse: 68  Resp: 18  BP: 125/74    Intake/Output:     Intake/Output Summary (Last 24 hours) at 2025 0723  Last data filed at 2025 0438  Gross per 24 hour   Intake 965 ml   Output 1025 ml   Net -60 ml       Last 3 Weights   25 0438 261 lb 14.4 oz (118.8 kg)   25 0502 262 lb 3.2 oz (118.9 kg)   25 0509 265 lb (120.2 kg)   06/10/25 0530 262 lb 1.6 oz (118.9 kg)   25 0152 271 lb 6.4 oz (123.1 kg)   25 2112 272 lb (123.4 kg)   23 1347 278 lb (126.1 kg)   23 2242 250 lb 12.8 oz (113.8 kg)   23 1313 270 lb (122.5 kg)         Physical Exam:    General: Alert. No apparent distress. No respiratory or constitutional distress.  HEENT: Normocephalic, anicteric sclera  Neck: supple  Cardiac: Regular rate and rhythm. S1, S2 normal. No murmur, pericardial rub, S3, thrill, heave or extra cardiac sounds.  Lungs: Clear without wheezes, rales, rhonchi or dullness.  Normal excursions  and effort.  Abdomen: Soft, non-distended  Extremities: 1+ edema  Neurologic: Alert, normal affect. No motor or coordinational deficit.  Skin: Warm and dry.     Laboratory/Data:    Labs:         Recent Labs   Lab 06/08/25  2148 06/10/25  0651 06/12/25  0644   WBC 8.0 7.0 7.2   HGB 15.4 16.2 16.1   MCV 96.9 93.9 95.5   .0 158.0 167.0       Recent Labs   Lab 06/08/25  2148 06/10/25  0651 06/11/25  0717 06/12/25  0644 06/12/25  1446    142  --  139 139   K 4.1 3.4* 3.4* 3.5  3.5 4.1    103  --  101 103   CO2 27.0 28.0  --  29.0 28.0   BUN 21 22  --  28* 29*   CREATSERUM 1.35* 1.49*  --  1.49* 1.60*   CA 8.9 9.1  --  8.6* 9.1   * 80  --  104* 92       Recent Labs   Lab 06/08/25 2148   ALT 21   AST 23   ALB 3.8       No results for input(s): \"TROP\" in the last 168 hours.        ECHO 5/25:  1.  Left ventricle: The cavity size is normal. There is mild concentric       hypertrophy. Systolic function is normal. The estimated ejection       fraction is 55-60%. Wall motion is normal; there are no regional wall       motion abnormalities. Grade I diastolic dysfunction.   2.  Aortic valve: There is mild calcification. There is mild stenosis. There       is trace regurgitation. The peak systolic velocity is 2.7m/sec. The mean       systolic gradient is 18mm Hg. The peak systolic gradient is 30mm Hg. The       valve area is 1.6cm^2.   3.  Systemic arteries: The ascending aorta A-P end-diastolic diameter is       4.0cm.   4.  Ascending aorta: The vessel is mildly dilated.   5.  Mitral valve: The annulus is mildly calcified. There is mild       regurgitation.   6.  Left atrium: The atrium is mildly to moderately dilated.   7.  Right ventricle: The cavity size is mildly increased. Wall thickness is       normal. Systolic function is normal. Estimation of the right ventricular       systolic pressure is mildly to moderately increased. The RV pressure       during systole is 38mm Hg.   8.  Right atrium: The  atrium is mildly dilated. There is a small,       hyperechoic, relatively fixed mass on the right side of the interatrial       septum. The appearance is most consistent with myxoma.   9.  Tricuspid valve: There is mild-moderate regurgitation.   10. Pericardium, extracardiac: There is no significant pericardial effusion.   Impressions:     - Prior study date: 6/7/23.   - Largely similar. 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           Allergies:   Allergies[2]    Medications:  Current Hospital Medications[3]           [1]   Patient Active Problem List  Diagnosis    Acute diverticulitis    Chronic kidney disease    Hypertension    Bipolar affective ([facility])    High cholesterol    ESTHER on CPAP    Dyspnea    Weight gain    Morbid obesity with BMI of 40.0-44.9, adult ([facility])    History of depression    History of stroke    Acute kidney injury    Hypokalemia    Coronary artery disease involving native coronary artery of native heart without angina pectoris    Chronic heart failure with preserved ejection fraction ([facility])    Gastrointestinal hemorrhage, unspecified gastrointestinal hemorrhage type    Blood loss anemia    Peripheral edema    Heart murmur   [2]   Allergies  Allergen Reactions    Cat Hair Extract OTHER (SEE COMMENTS)    Radiology Contrast Iodinated Dyes OTHER (SEE COMMENTS)    Other ITCHING     Cat Dander   [3]   Current Facility-Administered Medications   Medication Dose Route Frequency    furosemide (Lasix) 10 mg/mL injection 40 mg  40 mg Intravenous BID (Diuretic)    spironolactone (Aldactone) tab 25 mg  25 mg Oral Daily    metoprolol succinate ER (Toprol XL) 24 hr tab 25 mg  25 mg Oral Daily Beta Blocker    busPIRone (Buspar) tab 5 mg  5 mg Oral BID    DULoxetine (Cymbalta) DR cap 90 mg  90 mg Oral Daily    memantine (Namenda) tab 10 mg  10 mg Oral BID    rosuvastatin (Crestor) tab 20 mg  20 mg Oral Daily    oxybutynin (Ditropan) tab 5 mg  5 mg Oral BID    ondansetron (Zofran) 4 MG/2ML injection 4 mg  4 mg  Intravenous Q6H PRN    acetaminophen (Tylenol) tab 650 mg  650 mg Oral Q6H PRN    hydrALAzine (Apresoline) 20 mg/mL injection 10 mg  10 mg Intravenous Q4H PRN    zolpidem (Ambien) tab 5 mg  5 mg Oral Nightly PRN    alum-mag hydroxide-simethicone (Maalox) 200-200-20 MG/5ML oral suspension 30 mL  30 mL Oral QID PRN    pantoprazole (Protonix) DR tab 40 mg  40 mg Oral QAM AC    haloperidol lactate (Haldol) 5 MG/ML injection 2 mg  2 mg Intravenous Q6H PRN    ceFAZolin (Ancef) 2g in 10mL IV syringe premix  2 g Intravenous Q8H    minerin (Eucirin) cream   Topical Daily    apixaban (Eliquis) tab 5 mg  5 mg Oral BID

## 2025-06-14 LAB
ANION GAP SERPL CALC-SCNC: 6 MMOL/L (ref 0–18)
BUN BLD-MCNC: 35 MG/DL (ref 9–23)
BUN/CREAT SERPL: 21.5 (ref 10–20)
CALCIUM BLD-MCNC: 8.9 MG/DL (ref 8.7–10.4)
CHLORIDE SERPL-SCNC: 102 MMOL/L (ref 98–112)
CO2 SERPL-SCNC: 32 MMOL/L (ref 21–32)
CREAT BLD-MCNC: 1.63 MG/DL (ref 0.7–1.3)
EGFRCR SERPLBLD CKD-EPI 2021: 42 ML/MIN/1.73M2 (ref 60–?)
GLUCOSE BLD-MCNC: 110 MG/DL (ref 70–99)
OSMOLALITY SERPL CALC.SUM OF ELEC: 299 MOSM/KG (ref 275–295)
POTASSIUM SERPL-SCNC: 3.6 MMOL/L (ref 3.5–5.1)
SODIUM SERPL-SCNC: 140 MMOL/L (ref 136–145)

## 2025-06-14 PROCEDURE — 99233 SBSQ HOSP IP/OBS HIGH 50: CPT | Performed by: HOSPITALIST

## 2025-06-14 RX ORDER — FUROSEMIDE 10 MG/ML
40 INJECTION INTRAMUSCULAR; INTRAVENOUS ONCE
Status: COMPLETED | OUTPATIENT
Start: 2025-06-14 | End: 2025-06-14

## 2025-06-14 NOTE — PROGRESS NOTES
[facility]  Progress Note     Prashanth Caceres  : 10/21/1940    Status: Inpatient  Day #: 4    Attending: Dg Pulido MD  PCP: TOMMIE HOLLIS MD     Assessment and Plan:    Acute on chronic diastolic heart failure  - Secondary to poor compliance with diuretics. Still with some fluid overload but making progress.  - cardiology on consult  - cont diuresis with IV lasix - d/w cardiology - received IV dose this morning. Will give another dose later today and re-assess tomorrow.   - cont aldactone (new medication)  - monitor I's and O's and daily weights. Net negative 3.6L since admission    Paroxysmal afib  H/o CVA  - cont eliquis  - cont metoprolol     LLE cellulitis  - cont IV ancef  - erythema improving     CKD stage II  Avoid nephrotoxic medications  - monitor bmp with diuresis -stable today     Hx of aortic stenosis     Bipolar mood disorder  Cont home meds     Morbid obesity with obstructive sleep apnea  BMI 41.2.       Hx of PE  - cont eliquis    Dispo:  PT/OT. Plans for onsite PT at patient's ILF apartment at [facility]. Discharge once more euvolemic and able to transition to oral diuretics.        DVT Mechanical Prophylaxis:        DVT Pharmacologic Prophylaxis   Medication    apixaban (Eliquis) tab 5 mg               Subjective:      Initial Chief Complaint:  edema    No SOB, no leg pain today    10 point ROS completed and was negative, except for pertinent positive and negatives stated in subjective.      Objective:      Temp:  [97.8 °F (36.6 °C)-98.7 °F (37.1 °C)] 98 °F (36.7 °C)  Pulse:  [58-73] 62  Resp:  [16-18] 16  BP: (103-124)/(72-84) 114/72  SpO2:  [91 %-94 %] 93 %  General:  Alert, no distress  HEENT:  Normocephalic, atraumatic  Cardiac:  Regular rate, regular rhythm  Pulmonary:  Clear to auscultation bilaterally, respirations unlabored  Gastrointestinal:  Soft, non-tender, normal bowel sounds  Musculoskeletal:  No joint swelling  Extremities:  b/l LE edema. LLE erythema  improved  Neurologic:  nonfocal  Psychiatric:  Normal affect, calm and appropriate    Intake/Output Summary (Last 24 hours) at 6/14/2025 1410  Last data filed at 6/14/2025 0900  Gross per 24 hour   Intake 740 ml   Output 725 ml   Net 15 ml         Recent Labs   Lab 06/08/25  2148 06/10/25  0651 06/12/25  0644   WBC 8.0 7.0 7.2   HGB 15.4 16.2 16.1   HCT 46.6 48.9 48.3   .0 158.0 167.0   RBC 4.81 5.21 5.06   MCV 96.9 93.9 95.5   MCH 32.0 31.1 31.8   MCHC 33.0 33.1 33.3   RDW 13.0 13.0 12.9   NEPRELIM 6.37 4.60 4.75     Recent Labs   Lab 06/08/25  2148 06/10/25  0651 06/12/25  1446 06/13/25  0633 06/14/25  0815   BUN 21   < > 29* 31* 35*   CREATSERUM 1.35*   < > 1.60* 1.62* 1.63*   CA 8.9   < > 9.1 8.7 8.9   ALB 3.8  --   --   --   --       < > 139 141 140   K 4.1   < > 4.1 3.7 3.6      < > 103 101 102   CO2 27.0   < > 28.0 30.0 32.0   *   < > 92 103* 110*   BILT 0.5  --   --   --   --    AST 23  --   --   --   --    ALT 21  --   --   --   --    ALKPHO 115  --   --   --   --    TP 5.9  --   --   --   --     < > = values in this interval not displayed.       No results found.      Medications:  Scheduled Medications[1]   PRN Meds: PRN Medications[2]    Supplementary Documentation:   DVT Mechanical Prophylaxis:        DVT Pharmacologic Prophylaxis   Medication    apixaban (Eliquis) tab 5 mg                Code Status: Prior  Naranjo: External urinary catheter in place  Naranjo Duration (in days):   Central line:    DONTAE: 6/14/2025        **Certification      PHYSICIAN Certification of Need for Inpatient Hospitalization - Initial Certification    Patient will require inpatient services that will reasonably be expected to span two midnight's based on the clinical documentation in H+P.   Based on patients current state of illness, I anticipate that, after discharge, patient will require TBD.            MDM High. Time spent on chart/note review, review of labs/imaging, discussion with patient, physical  exam, discussion with staff, consultants, coordinating care, writing progress note, discussion of plan of care.      Dg Pulido MD         [1]    furosemide  40 mg Intravenous Daily    spironolactone  25 mg Oral Daily    metoprolol succinate  25 mg Oral Daily Beta Blocker    busPIRone  5 mg Oral BID    DULoxetine  90 mg Oral Daily    memantine  10 mg Oral BID    rosuvastatin  20 mg Oral Daily    oxybutynin  5 mg Oral BID    pantoprazole  40 mg Oral QAM AC    ceFAZolin  2 g Intravenous Q8H    minerin   Topical Daily    apixaban  5 mg Oral BID   [2]   ondansetron    acetaminophen    hydrALAzine    zolpidem    alum-mag hydroxide-simethicone    haloperidol lactate

## 2025-06-14 NOTE — PLAN OF CARE
Patient is alert and oriented times 3-4. Forgetful. On RA. No complains of pain. Plan to switch IV diuretic to oral tomorrow. PT/OT referral in chart. Pending IV antibiotic transition to PO at DC.     Problem: CARDIOVASCULAR - ADULT  Goal: Maintains optimal cardiac output and hemodynamic stability  Description: INTERVENTIONS:  - Monitor vital signs, rhythm, and trends  - Monitor for bleeding, hypotension and signs of decreased cardiac output  - Evaluate effectiveness of vasoactive medications to optimize hemodynamic stability  - Monitor arterial and/or venous puncture sites for bleeding and/or hematoma  - Assess quality of pulses, skin color and temperature  - Assess for signs of decreased coronary artery perfusion - ex. Angina  - Evaluate fluid balance, assess for edema, trend weights  Outcome: Progressing

## 2025-06-14 NOTE — PLAN OF CARE
Pt Aox4, forgetful at times, Denies pain, sob, n/v, cp. RA, up x1 with walker. BLE edema. RA, VSS, fall precautions in place, call light and belongings within reach. IV abx continued.     Problem: Patient Centered Care  Goal: Patient preferences are identified and integrated in the patient's plan of care  Description: Interventions:  - What would you like us to know as we care for you? Per wife, patient is not allowed dairy as per GI MD  - Provide timely, complete, and accurate information to patient/family  - Incorporate patient and family knowledge, values, beliefs, and cultural backgrounds into the planning and delivery of care  - Encourage patient/family to participate in care and decision-making at the level they choose  - Honor patient and family perspectives and choices  Outcome: Progressing     Problem: CARDIOVASCULAR - ADULT  Goal: Maintains optimal cardiac output and hemodynamic stability  Description: INTERVENTIONS:  - Monitor vital signs, rhythm, and trends  - Monitor for bleeding, hypotension and signs of decreased cardiac output  - Evaluate effectiveness of vasoactive medications to optimize hemodynamic stability  - Monitor arterial and/or venous puncture sites for bleeding and/or hematoma  - Assess quality of pulses, skin color and temperature  - Assess for signs of decreased coronary artery perfusion - ex. Angina  - Evaluate fluid balance, assess for edema, trend weights  Outcome: Progressing  Goal: Absence of cardiac arrhythmias or at baseline  Description: INTERVENTIONS:  - Continuous cardiac monitoring, monitor vital signs, obtain 12 lead EKG if indicated  - Evaluate effectiveness of antiarrhythmic and heart rate control medications as ordered  - Initiate emergency measures for life threatening arrhythmias  - Monitor electrolytes and administer replacement therapy as ordered  Outcome: Progressing     Problem: RESPIRATORY - ADULT  Goal: Achieves optimal ventilation and oxygenation  Description:  INTERVENTIONS:  - Assess for changes in respiratory status  - Assess for changes in mentation and behavior  - Position to facilitate oxygenation and minimize respiratory effort  - Oxygen supplementation based on oxygen saturation or ABGs  - Provide Smoking Cessation handout, if applicable  - Encourage broncho-pulmonary hygiene including cough, deep breathe, Incentive Spirometry  - Assess the need for suctioning and perform as needed  - Assess and instruct to report SOB or any respiratory difficulty  - Respiratory Therapy support as indicated  - Manage/alleviate anxiety  - Monitor for signs/symptoms of CO2 retention  Outcome: Progressing     Problem: METABOLIC/FLUID AND ELECTROLYTES - ADULT  Goal: Electrolytes maintained within normal limits  Description: INTERVENTIONS:  - Monitor labs and rhythm and assess patient for signs and symptoms of electrolyte imbalances  - Administer electrolyte replacement as ordered  - Monitor response to electrolyte replacements, including rhythm and repeat lab results as appropriate  - Fluid restriction as ordered  - Instruct patient on fluid and nutrition restrictions as appropriate  Outcome: Progressing  Goal: Hemodynamic stability and optimal renal function maintained  Description: INTERVENTIONS:  - Monitor labs and assess for signs and symptoms of volume excess or deficit  - Monitor intake, output and patient weight  - Monitor urine specific gravity, serum osmolarity and serum sodium as indicated or ordered  - Monitor response to interventions for patient's volume status, including labs, urine output, blood pressure (other measures as available)  - Encourage oral intake as appropriate  - Instruct patient on fluid and nutrition restrictions as appropriate  Outcome: Progressing

## 2025-06-14 NOTE — PROGRESS NOTES
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 Net negative 3.1 L  - baseline creatinine 1.4-1.5. currently 1.63 and stable. May need to tolerate slightly lower GFR to allow adequate diuresis  - spironolactone 25mg daily added (this was planned in the outpatient setting, however appears instructions misconstrued)  - Cont statin, beta blocker  - Cont eliquis for stroke prevention (monitor renal function, dose accordingly)  - Abx as per primary service  - Monitor on tele  -DC from CV standpoint pending response to further diuresis        Subjective:     No chest pain or dyspnea.         Problem List[1]    Objective:   Temp: 98 °F (36.7 °C)  Pulse: 62  Resp: 16  BP: 114/72    Intake/Output:     Intake/Output Summary (Last 24 hours) at 2025 1001  Last data filed at 2025 1432  Gross per 24 hour   Intake 750 ml   Output --   Net 750 ml       Last 3 Weights   25 0346 259 lb 4.8 oz (117.6 kg)   25 0438 261 lb 14.4 oz (118.8 kg)   25 0502 262 lb 3.2 oz (118.9 kg)   25 0509 265 lb (120.2 kg)   06/10/25 0530 262 lb 1.6 oz (118.9 kg)   25 0152 271 lb 6.4 oz (123.1 kg)   25 2112 272 lb (123.4 kg)   23 1347 278 lb (126.1 kg)   23 2242 250 lb 12.8 oz (113.8 kg)   23 1313 270 lb (122.5 kg)         Physical Exam:    General: Alert. No apparent distress. No respiratory or constitutional distress.  HEENT: Normocephalic, anicteric sclera  Neck: supple  Cardiac:  Regular rate and rhythm. S1, S2 normal. No murmur, pericardial rub, S3, thrill, heave or extra cardiac sounds.  Lungs: Clear without wheezes, rales, rhonchi or dullness.  Normal excursions and effort.  Abdomen: Soft, non-distended  Extremities: 1+ edema  Neurologic: Alert, normal affect. No motor or coordinational deficit.  Skin: Warm and dry.     Laboratory/Data:    Labs:         Recent Labs   Lab 06/08/25  2148 06/10/25  0651 06/12/25  0644   WBC 8.0 7.0 7.2   HGB 15.4 16.2 16.1   MCV 96.9 93.9 95.5   .0 158.0 167.0       Recent Labs   Lab 06/10/25  0651 06/11/25  0717 06/12/25  0644 06/12/25  1446 06/13/25  0633 06/14/25  0815     --  139 139 141 140   K 3.4* 3.4* 3.5  3.5 4.1 3.7 3.6     --  101 103 101 102   CO2 28.0  --  29.0 28.0 30.0 32.0   BUN 22  --  28* 29* 31* 35*   CREATSERUM 1.49*  --  1.49* 1.60* 1.62* 1.63*   CA 9.1  --  8.6* 9.1 8.7 8.9   GLU 80  --  104* 92 103* 110*       Recent Labs   Lab 06/08/25 2148   ALT 21   AST 23   ALB 3.8       No results for input(s): \"TROP\" in the last 168 hours.        ECHO 5/25:  1.  Left ventricle: The cavity size is normal. There is mild concentric       hypertrophy. Systolic function is normal. The estimated ejection       fraction is 55-60%. Wall motion is normal; there are no regional wall       motion abnormalities. Grade I diastolic dysfunction.   2.  Aortic valve: There is mild calcification. There is mild stenosis. There       is trace regurgitation. The peak systolic velocity is 2.7m/sec. The mean       systolic gradient is 18mm Hg. The peak systolic gradient is 30mm Hg. The       valve area is 1.6cm^2.   3.  Systemic arteries: The ascending aorta A-P end-diastolic diameter is       4.0cm.   4.  Ascending aorta: The vessel is mildly dilated.   5.  Mitral valve: The annulus is mildly calcified. There is mild       regurgitation.   6.  Left atrium: The atrium is mildly to moderately dilated.   7.  Right ventricle: The cavity size is mildly  increased. Wall thickness is       normal. Systolic function is normal. Estimation of the right ventricular       systolic pressure is mildly to moderately increased. The RV pressure       during systole is 38mm Hg.   8.  Right atrium: The atrium is mildly dilated. There is a small,       hyperechoic, relatively fixed mass on the right side of the interatrial       septum. The appearance is most consistent with myxoma.   9.  Tricuspid valve: There is mild-moderate regurgitation.   10. Pericardium, extracardiac: There is no significant pericardial effusion.   Impressions:     - Prior study date: 6/7/23.   - Largely similar. 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           Allergies:   Allergies[2]    Medications:  Current Hospital Medications[3]           [1]   Patient Active Problem List  Diagnosis    Acute diverticulitis    Chronic kidney disease    Hypertension    Bipolar affective (HCC)    High cholesterol    ESTHER on CPAP    Dyspnea    Weight gain    Morbid obesity with BMI of 40.0-44.9, adult ([facility])    History of depression    History of stroke    Acute kidney injury    Hypokalemia    Coronary artery disease involving native coronary artery of native heart without angina pectoris    Chronic heart failure with preserved ejection fraction (HCC)    Gastrointestinal hemorrhage, unspecified gastrointestinal hemorrhage type    Blood loss anemia    Peripheral edema    Heart murmur   [2]   Allergies  Allergen Reactions    Cat Hair Extract OTHER (SEE COMMENTS)    Radiology Contrast Iodinated Dyes OTHER (SEE COMMENTS)    Other ITCHING     Cat Dander   [3]   Current Facility-Administered Medications   Medication Dose Route Frequency    furosemide (Lasix) 10 mg/mL injection 40 mg  40 mg Intravenous Daily    spironolactone (Aldactone) tab 25 mg  25 mg Oral Daily    metoprolol succinate ER (Toprol XL) 24 hr tab 25 mg  25 mg Oral Daily Beta Blocker    busPIRone (Buspar) tab 5 mg  5 mg Oral BID    DULoxetine (Cymbalta) DR cap 90  mg  90 mg Oral Daily    memantine (Namenda) tab 10 mg  10 mg Oral BID    rosuvastatin (Crestor) tab 20 mg  20 mg Oral Daily    oxybutynin (Ditropan) tab 5 mg  5 mg Oral BID    ondansetron (Zofran) 4 MG/2ML injection 4 mg  4 mg Intravenous Q6H PRN    acetaminophen (Tylenol) tab 650 mg  650 mg Oral Q6H PRN    hydrALAzine (Apresoline) 20 mg/mL injection 10 mg  10 mg Intravenous Q4H PRN    zolpidem (Ambien) tab 5 mg  5 mg Oral Nightly PRN    alum-mag hydroxide-simethicone (Maalox) 200-200-20 MG/5ML oral suspension 30 mL  30 mL Oral QID PRN    pantoprazole (Protonix) DR tab 40 mg  40 mg Oral QAM AC    haloperidol lactate (Haldol) 5 MG/ML injection 2 mg  2 mg Intravenous Q6H PRN    ceFAZolin (Ancef) 2g in 10mL IV syringe premix  2 g Intravenous Q8H    minerin (Eucirin) cream   Topical Daily    apixaban (Eliquis) tab 5 mg  5 mg Oral BID

## 2025-06-15 LAB
ANION GAP SERPL CALC-SCNC: 8 MMOL/L (ref 0–18)
BUN BLD-MCNC: 32 MG/DL (ref 9–23)
BUN/CREAT SERPL: 18.8 (ref 10–20)
CALCIUM BLD-MCNC: 9.1 MG/DL (ref 8.7–10.4)
CHLORIDE SERPL-SCNC: 103 MMOL/L (ref 98–112)
CO2 SERPL-SCNC: 28 MMOL/L (ref 21–32)
CREAT BLD-MCNC: 1.7 MG/DL (ref 0.7–1.3)
EGFRCR SERPLBLD CKD-EPI 2021: 40 ML/MIN/1.73M2 (ref 60–?)
GLUCOSE BLD-MCNC: 125 MG/DL (ref 70–99)
GLUCOSE BLDC GLUCOMTR-MCNC: 134 MG/DL (ref 70–99)
OSMOLALITY SERPL CALC.SUM OF ELEC: 296 MOSM/KG (ref 275–295)
POTASSIUM SERPL-SCNC: 3.9 MMOL/L (ref 3.5–5.1)
SODIUM SERPL-SCNC: 139 MMOL/L (ref 136–145)

## 2025-06-15 PROCEDURE — 99233 SBSQ HOSP IP/OBS HIGH 50: CPT | Performed by: HOSPITALIST

## 2025-06-15 RX ORDER — BUMETANIDE 1 MG/1
1 TABLET ORAL DAILY
Status: DISCONTINUED | OUTPATIENT
Start: 2025-06-16 | End: 2025-06-15

## 2025-06-15 RX ORDER — BUMETANIDE 1 MG/1
1 TABLET ORAL DAILY
Status: DISCONTINUED | OUTPATIENT
Start: 2025-06-15 | End: 2025-06-16

## 2025-06-15 RX ORDER — BUMETANIDE 1 MG/1
2 TABLET ORAL DAILY
Status: DISCONTINUED | OUTPATIENT
Start: 2025-06-15 | End: 2025-06-15

## 2025-06-15 NOTE — PROGRESS NOTES
[facility]  Progress Note     Prashanth Caceres  : 10/21/1940    Status: Inpatient  Day #: 5    Attending: Dg Pulido MD  PCP: TOMMIE HOLLIS MD     Assessment and Plan:    Acute on chronic diastolic heart failure  - Secondary to poor compliance with diuretics. Still with some fluid overload but making progress.  - cardiology on consult  - received lasix IV - transition to oral bumex today. To monitor another day per cardiology.  - cont aldactone (new medication)  - monitor I's and O's and daily weights. Net negative 3.8L since admission    Paroxysmal afib  H/o CVA  - cont eliquis  - cont metoprolol     LLE cellulitis  - cont IV ancef  - erythema improving     CKD stage II  Avoid nephrotoxic medications  - monitor bmp with diuresis - stable today     Hx of aortic stenosis     Bipolar mood disorder  Cont home meds     Morbid obesity with obstructive sleep apnea  BMI 41.2.       Hx of PE  - cont eliquis    Dispo:  PT/OT. Plans for onsite PT at patient's ILF apartment at [facility]. Discharge once more euvolemic and able to transition to oral diuretics.        DVT Mechanical Prophylaxis:        DVT Pharmacologic Prophylaxis   Medication    apixaban (Eliquis) tab 5 mg               Subjective:      Initial Chief Complaint:  edema    No SOB, no leg pain today    10 point ROS completed and was negative, except for pertinent positive and negatives stated in subjective.      Objective:      Temp:  [97.5 °F (36.4 °C)-98.1 °F (36.7 °C)] 97.5 °F (36.4 °C)  Pulse:  [59-69] 59  Resp:  [18-20] 20  BP: (115-151)/(73-82) 140/73  SpO2:  [92 %-96 %] 92 %  General:  Alert, no distress  HEENT:  Normocephalic, atraumatic  Cardiac:  Regular rate, regular rhythm  Pulmonary:  Clear to auscultation bilaterally, respirations unlabored  Gastrointestinal:  Soft, non-tender, normal bowel sounds  Musculoskeletal:  No joint swelling  Extremities:  b/l LE edema. LLE erythema improved  Neurologic:  nonfocal  Psychiatric:  Normal affect,  calm and appropriate    Intake/Output Summary (Last 24 hours) at 6/15/2025 0935  Last data filed at 6/15/2025 0901  Gross per 24 hour   Intake 700 ml   Output 900 ml   Net -200 ml         Recent Labs   Lab 06/08/25  2148 06/10/25  0651 06/12/25  0644   WBC 8.0 7.0 7.2   HGB 15.4 16.2 16.1   HCT 46.6 48.9 48.3   .0 158.0 167.0   RBC 4.81 5.21 5.06   MCV 96.9 93.9 95.5   MCH 32.0 31.1 31.8   MCHC 33.0 33.1 33.3   RDW 13.0 13.0 12.9   NEPRELIM 6.37 4.60 4.75     Recent Labs   Lab 06/08/25  2148 06/10/25  0651 06/12/25  1446 06/13/25  0633 06/14/25  0815   BUN 21   < > 29* 31* 35*   CREATSERUM 1.35*   < > 1.60* 1.62* 1.63*   CA 8.9   < > 9.1 8.7 8.9   ALB 3.8  --   --   --   --       < > 139 141 140   K 4.1   < > 4.1 3.7 3.6      < > 103 101 102   CO2 27.0   < > 28.0 30.0 32.0   *   < > 92 103* 110*   BILT 0.5  --   --   --   --    AST 23  --   --   --   --    ALT 21  --   --   --   --    ALKPHO 115  --   --   --   --    TP 5.9  --   --   --   --     < > = values in this interval not displayed.       No results found.      Medications:  Scheduled Medications[1]   PRN Meds: PRN Medications[2]    Supplementary Documentation:   DVT Mechanical Prophylaxis:        DVT Pharmacologic Prophylaxis   Medication    apixaban (Eliquis) tab 5 mg                Code Status: Prior  Naranjo: No urinary catheter in place  Naranjo Duration (in days):   Central line:    DONTAE: 6/16/2025        **Certification      PHYSICIAN Certification of Need for Inpatient Hospitalization - Initial Certification    Patient will require inpatient services that will reasonably be expected to span two midnight's based on the clinical documentation in H+P.   Based on patients current state of illness, I anticipate that, after discharge, patient will require TBD.            MDM High. Time spent on chart/note review, review of labs/imaging, discussion with patient, physical exam, discussion with staff, consultants, coordinating care,  writing progress note, discussion of plan of care.      Dg Pulido MD         [1]    bumetanide  2 mg Oral Daily    spironolactone  25 mg Oral Daily    metoprolol succinate  25 mg Oral Daily Beta Blocker    busPIRone  5 mg Oral BID    DULoxetine  90 mg Oral Daily    memantine  10 mg Oral BID    rosuvastatin  20 mg Oral Daily    oxybutynin  5 mg Oral BID    pantoprazole  40 mg Oral QAM AC    ceFAZolin  2 g Intravenous Q8H    minerin   Topical Daily    apixaban  5 mg Oral BID   [2]   ondansetron    acetaminophen    hydrALAzine    zolpidem    alum-mag hydroxide-simethicone    haloperidol lactate

## 2025-06-15 NOTE — PLAN OF CARE
Patient is alert and oriented, RA. No complaints of pain. Cr increased only gave 1 mg of bumex. DC pending AM labs tomorrow.       Problem: Patient Centered Care  Goal: Patient preferences are identified and integrated in the patient's plan of care  Description: Interventions:  - What would you like us to know as we care for you? Per wife, patient is not allowed dairy as per GI MD  - Provide timely, complete, and accurate information to patient/family  - Incorporate patient and family knowledge, values, beliefs, and cultural backgrounds into the planning and delivery of care  - Encourage patient/family to participate in care and decision-making at the level they choose  - Honor patient and family perspectives and choices  Outcome: Progressing     Problem: Patient/Family Goals  Goal: Patient/Family Long Term Goal  Description: Patient's Long Term Goal: ensure medications work well and don't need to be changed by MD    Interventions:  - monitor new medications and give education  - ensure patient and wife understand importance of diet and fluid intake  - See additional Care Plan goals for specific interventions  Outcome: Progressing  Goal: Patient/Family Short Term Goal  Description: Patient's Short Term Goal: get rid of leg swelling    Interventions:   - monitor labs  - monitor intake and output  - diuretic medication as per MD order  - See additional Care Plan goals for specific interventions  Outcome: Progressing     Problem: CARDIOVASCULAR - ADULT  Goal: Maintains optimal cardiac output and hemodynamic stability  Description: INTERVENTIONS:  - Monitor vital signs, rhythm, and trends  - Monitor for bleeding, hypotension and signs of decreased cardiac output  - Evaluate effectiveness of vasoactive medications to optimize hemodynamic stability  - Monitor arterial and/or venous puncture sites for bleeding and/or hematoma  - Assess quality of pulses, skin color and temperature  - Assess for signs of decreased coronary  artery perfusion - ex. Angina  - Evaluate fluid balance, assess for edema, trend weights  Outcome: Progressing     Problem: CARDIOVASCULAR - ADULT  Goal: Maintains optimal cardiac output and hemodynamic stability  Description: INTERVENTIONS:  - Monitor vital signs, rhythm, and trends  - Monitor for bleeding, hypotension and signs of decreased cardiac output  - Evaluate effectiveness of vasoactive medications to optimize hemodynamic stability  - Monitor arterial and/or venous puncture sites for bleeding and/or hematoma  - Assess quality of pulses, skin color and temperature  - Assess for signs of decreased coronary artery perfusion - ex. Angina  - Evaluate fluid balance, assess for edema, trend weights  Outcome: Progressing  Goal: Absence of cardiac arrhythmias or at baseline  Description: INTERVENTIONS:  - Continuous cardiac monitoring, monitor vital signs, obtain 12 lead EKG if indicated  - Evaluate effectiveness of antiarrhythmic and heart rate control medications as ordered  - Initiate emergency measures for life threatening arrhythmias  - Monitor electrolytes and administer replacement therapy as ordered  Outcome: Progressing     Problem: RESPIRATORY - ADULT  Goal: Achieves optimal ventilation and oxygenation  Description: INTERVENTIONS:  - Assess for changes in respiratory status  - Assess for changes in mentation and behavior  - Position to facilitate oxygenation and minimize respiratory effort  - Oxygen supplementation based on oxygen saturation or ABGs  - Provide Smoking Cessation handout, if applicable  - Encourage broncho-pulmonary hygiene including cough, deep breathe, Incentive Spirometry  - Assess the need for suctioning and perform as needed  - Assess and instruct to report SOB or any respiratory difficulty  - Respiratory Therapy support as indicated  - Manage/alleviate anxiety  - Monitor for signs/symptoms of CO2 retention  Outcome: Progressing     Problem: METABOLIC/FLUID AND ELECTROLYTES -  ADULT  Goal: Electrolytes maintained within normal limits  Description: INTERVENTIONS:  - Monitor labs and rhythm and assess patient for signs and symptoms of electrolyte imbalances  - Administer electrolyte replacement as ordered  - Monitor response to electrolyte replacements, including rhythm and repeat lab results as appropriate  - Fluid restriction as ordered  - Instruct patient on fluid and nutrition restrictions as appropriate  Outcome: Progressing  Goal: Hemodynamic stability and optimal renal function maintained  Description: INTERVENTIONS:  - Monitor labs and assess for signs and symptoms of volume excess or deficit  - Monitor intake, output and patient weight  - Monitor urine specific gravity, serum osmolarity and serum sodium as indicated or ordered  - Monitor response to interventions for patient's volume status, including labs, urine output, blood pressure (other measures as available)  - Encourage oral intake as appropriate  - Instruct patient on fluid and nutrition restrictions as appropriate  Outcome: Progressing

## 2025-06-15 NOTE — PROGRESS NOTES
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 Net negative 3.7 L  - Restart PO bumex. On 1 mg at home. Would increase to 2 mg today and see response. If urine output reasonable and renal function stable, then likely ok for DC tomorrow   - baseline creatinine 1.4-1.5. currently 1.63 and stable. May need to tolerate slightly lower GFR to allow adequate diuresis  - spironolactone 25mg daily added (this was planned in the outpatient setting, however appears instructions misconstrued)  - Cont statin, beta blocker  - Cont eliquis for stroke prevention (monitor renal function, dose accordingly)  - Abx as per primary service  - Monitor on tele  -DC from CV standpoint pending response to further diuresis        Subjective:     No chest pain or dyspnea.         Problem List[1]    Objective:   Temp: 97.5 °F (36.4 °C)  Pulse: 59  Resp: 20  BP: 140/73    Intake/Output:     Intake/Output Summary (Last 24 hours) at 6/15/2025 0734  Last data filed at 6/15/2025 0300  Gross per 24 hour   Intake 940 ml   Output 1525 ml   Net -585 ml       Last 3 Weights   06/15/25 0500 263 lb (119.3 kg)   25 0346 259 lb 4.8 oz (117.6 kg)   25 0438 261 lb 14.4 oz (118.8 kg)   25 0502 262 lb 3.2 oz (118.9 kg)   25 0509 265 lb (120.2 kg)   06/10/25 0530 262 lb 1.6 oz (118.9 kg)   25 0152 271 lb 6.4 oz (123.1 kg)   25 2112 272 lb (123.4 kg)   23 1347 278 lb (126.1 kg)   23 2242 250 lb 12.8 oz (113.8  kg)   07/19/23 1313 270 lb (122.5 kg)         Physical Exam:    General: Alert. No apparent distress. No respiratory or constitutional distress.  HEENT: Normocephalic, anicteric sclera  Neck: supple  Cardiac: Regular rate and rhythm. S1, S2 normal. No murmur, pericardial rub, S3, thrill, heave or extra cardiac sounds.  Lungs: Clear without wheezes, rales, rhonchi or dullness.  Normal excursions and effort.  Abdomen: Soft, non-distended  Extremities: 1+ edema  Neurologic: Alert, normal affect. No motor or coordinational deficit.  Skin: Warm and dry.     Laboratory/Data:    Labs:         Recent Labs   Lab 06/08/25  2148 06/10/25  0651 06/12/25  0644   WBC 8.0 7.0 7.2   HGB 15.4 16.2 16.1   MCV 96.9 93.9 95.5   .0 158.0 167.0       Recent Labs   Lab 06/10/25  0651 06/11/25  0717 06/12/25  0644 06/12/25  1446 06/13/25  0633 06/14/25  0815     --  139 139 141 140   K 3.4* 3.4* 3.5  3.5 4.1 3.7 3.6     --  101 103 101 102   CO2 28.0  --  29.0 28.0 30.0 32.0   BUN 22  --  28* 29* 31* 35*   CREATSERUM 1.49*  --  1.49* 1.60* 1.62* 1.63*   CA 9.1  --  8.6* 9.1 8.7 8.9   GLU 80  --  104* 92 103* 110*       Recent Labs   Lab 06/08/25  2148   ALT 21   AST 23   ALB 3.8       No results for input(s): \"TROP\" in the last 168 hours.        ECHO 5/25:  1.  Left ventricle: The cavity size is normal. There is mild concentric       hypertrophy. Systolic function is normal. The estimated ejection       fraction is 55-60%. Wall motion is normal; there are no regional wall       motion abnormalities. Grade I diastolic dysfunction.   2.  Aortic valve: There is mild calcification. There is mild stenosis. There       is trace regurgitation. The peak systolic velocity is 2.7m/sec. The mean       systolic gradient is 18mm Hg. The peak systolic gradient is 30mm Hg. The       valve area is 1.6cm^2.   3.  Systemic arteries: The ascending aorta A-P end-diastolic diameter is       4.0cm.   4.  Ascending aorta: The vessel is mildly  dilated.   5.  Mitral valve: The annulus is mildly calcified. There is mild       regurgitation.   6.  Left atrium: The atrium is mildly to moderately dilated.   7.  Right ventricle: The cavity size is mildly increased. Wall thickness is       normal. Systolic function is normal. Estimation of the right ventricular       systolic pressure is mildly to moderately increased. The RV pressure       during systole is 38mm Hg.   8.  Right atrium: The atrium is mildly dilated. There is a small,       hyperechoic, relatively fixed mass on the right side of the interatrial       septum. The appearance is most consistent with myxoma.   9.  Tricuspid valve: There is mild-moderate regurgitation.   10. Pericardium, extracardiac: There is no significant pericardial effusion.   Impressions:     - Prior study date: 6/7/23.   - Largely similar. 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           Allergies:   Allergies[2]    Medications:  Current Hospital Medications[3]    Sophia Mcdowell MD         [1]   Patient Active Problem List  Diagnosis    Acute diverticulitis    Chronic kidney disease    Hypertension    Bipolar affective ([facility])    High cholesterol    ESTHER on CPAP    Dyspnea    Weight gain    Morbid obesity with BMI of 40.0-44.9, adult ([facility])    History of depression    History of stroke    Acute kidney injury    Hypokalemia    Coronary artery disease involving native coronary artery of native heart without angina pectoris    Chronic heart failure with preserved ejection fraction (HCC)    Gastrointestinal hemorrhage, unspecified gastrointestinal hemorrhage type    Blood loss anemia    Peripheral edema    Heart murmur   [2]   Allergies  Allergen Reactions    Cat Hair Extract OTHER (SEE COMMENTS)    Radiology Contrast Iodinated Dyes OTHER (SEE COMMENTS)    Other ITCHING     Cat Dander   [3]   Current Facility-Administered Medications   Medication Dose Route Frequency    spironolactone (Aldactone) tab 25 mg  25 mg Oral Daily     metoprolol succinate ER (Toprol XL) 24 hr tab 25 mg  25 mg Oral Daily Beta Blocker    busPIRone (Buspar) tab 5 mg  5 mg Oral BID    DULoxetine (Cymbalta) DR cap 90 mg  90 mg Oral Daily    memantine (Namenda) tab 10 mg  10 mg Oral BID    rosuvastatin (Crestor) tab 20 mg  20 mg Oral Daily    oxybutynin (Ditropan) tab 5 mg  5 mg Oral BID    ondansetron (Zofran) 4 MG/2ML injection 4 mg  4 mg Intravenous Q6H PRN    acetaminophen (Tylenol) tab 650 mg  650 mg Oral Q6H PRN    hydrALAzine (Apresoline) 20 mg/mL injection 10 mg  10 mg Intravenous Q4H PRN    zolpidem (Ambien) tab 5 mg  5 mg Oral Nightly PRN    alum-mag hydroxide-simethicone (Maalox) 200-200-20 MG/5ML oral suspension 30 mL  30 mL Oral QID PRN    pantoprazole (Protonix) DR tab 40 mg  40 mg Oral QAM AC    haloperidol lactate (Haldol) 5 MG/ML injection 2 mg  2 mg Intravenous Q6H PRN    ceFAZolin (Ancef) 2g in 10mL IV syringe premix  2 g Intravenous Q8H    minerin (Eucirin) cream   Topical Daily    apixaban (Eliquis) tab 5 mg  5 mg Oral BID

## 2025-06-15 NOTE — PHYSICAL THERAPY NOTE
PHYSICAL THERAPY TREATMENT NOTE - INPATIENT     Room Number: 322/322-A       Presenting Problem: peripheral edema       Problem List  Principal Problem:    Peripheral edema  Active Problems:    Heart murmur      PHYSICAL THERAPY ASSESSMENT   Patient demonstrates good  progress this session, goals  remain in progress.      Patient is requiring contact guard assist and minimal assist as a result of the following impairments: decreased functional strength, decreased endurance/aerobic capacity, impaired motor planning, decreased muscular endurance, and medical status.     Patient continues to function below baseline with transfers, gait, maintaining seated position, and standing prolonged periods.  Next session anticipate patient to progress bed mobility, transfers, gait, and stair negotiation.  Physical Therapy will continue to follow patient for duration of hospitalization.    Patient continues to benefit from continued skilled PT services: at discharge to promote prior level of function and safety with additional support and return home with home health PT.    PLAN DURING HOSPITALIZATION  Nursing Mobility Recommendation : 1 Assist  PT Device Recommendation: Rolling walker  PT Treatment Plan: Bed mobility, Body mechanics, Patient education, Gait training, Strengthening, Transfer training, Balance training  Frequency (Obs): 5x/week     SUBJECTIVE  I like to use bathroom    OBJECTIVE  Precautions: None    WEIGHT BEARING RESTRICTION       PAIN ASSESSMENT   Ratin  Location: swelling on b le's L >R  Management Techniques: Activity promotion, Body mechanics, Relaxation, Repositioning    BALANCE  Static Sitting: Good  Dynamic Sitting: Fair +  Static Standing: Fair  Dynamic Standing: Fair -    ACTIVITY TOLERANCE                          O2 WALK       AM-PAC '6-Clicks' INPATIENT SHORT FORM - BASIC MOBILITY  How much difficulty does the patient currently have...  Patient Difficulty: Turning over in bed (including adjusting  bedclothes, sheets and blankets)?: A Little   Patient Difficulty: Sitting down on and standing up from a chair with arms (e.g., wheelchair, bedside commode, etc.): A Little   Patient Difficulty: Moving from lying on back to sitting on the side of the bed?: A Little   How much help from another person does the patient currently need...   Help from Another: Moving to and from a bed to a chair (including a wheelchair)?: A Little   Help from Another: Need to walk in hospital room?: A Little   Help from Another: Climbing 3-5 steps with a railing?: A Lot     AM-PAC Score:  Raw Score: 17   Approx Degree of Impairment: 50.57%   Standardized Score (AM-PAC Scale): 42.13   CMS Modifier (G-Code): CK    FUNCTIONAL ABILITY STATUS  Functional Mobility/Gait Assessment  Gait Assistance: Contact guard assist  Distance (ft): 60 ft x2  Assistive Device: Rolling walker  Pattern: Shuffle  Stairs: Other (comment)  Rolling: NT  Supine to Sit: NT  Sit to Supine: NT  Sit to Stand: contact guard assist    Skilled Therapy Provided: RN approved PT session. Pt sitting in chair with b le's elevated due swelling but no pain. Pt's wife present in room. Instructed pt on seated thera exs with ble's to improve functional ability. Sit to stand with CGA and using RW. Amb outside of room, noted decreased WBring on LLE, pt steady with amb. Rest break on EOB: 02 on RA 94% and HR 66 bpm.  After seated rest break, additional amb with RW and pt needs to use bathroom. Pt with CGA for using toilet but needs assist with briefs. Washing hands in standing position. Pt amb back to chair and left with all needs in reach and pt's wife present. Discussed discharged plan and using bathroom every 2 hrs/scheduled time. Pt and pt's wife verbalized understanding.     The patient's Approx Degree of Impairment: 50.57% has been calculated based on documentation in the [facility] '6 clicks' Inpatient Daily Activity Short Form.  Research supports that patients with this level of  impairment may benefit from home with HHPT.  Final disposition will be made by interdisciplinary medical team.    THERAPEUTIC EXERCISES  Lower Extremity Alternating marching  Ankle pumps  Knee extension     Position Sitting       Patient End of Session: Up in chair, Needs met, Call light within reach, RN aware of session/findings, All patient questions and concerns addressed, Alarm set, Family present    CURRENT GOALS     Goals to be met by: 25  Patient Goal Patient's self-stated goal is: go home   Goal #1 Patient is able to demonstrate supine - sit EOB @ level: supervision     Goal #1   Current Status NT   Goal #2 Patient is able to demonstrate transfers Sit to/from Stand at assistance level: supervision with walker - rolling     Goal #2  Current Status CGA   Goal #3 Patient is able to ambulate 150 feet with assist device: walker - rolling at assistance level: supervision   Goal #3   Current Status CGA with Rw 60 ft x2 with rest break   Goal #4    Goal #4   Current Status    Goal #5 Patient to demonstrate independence with home activity/exercise instructions provided to patient in preparation for discharge.   Goal #5   Current Status In progress   Goal #6    Goal #6  Current Status      Gait Trainin minutes  Therapeutic Activity: 14 minutes

## 2025-06-16 VITALS
BODY MASS INDEX: 39.05 KG/M2 | HEART RATE: 65 BPM | DIASTOLIC BLOOD PRESSURE: 75 MMHG | RESPIRATION RATE: 18 BRPM | WEIGHT: 257.63 LBS | OXYGEN SATURATION: 94 % | HEIGHT: 68 IN | TEMPERATURE: 98 F | SYSTOLIC BLOOD PRESSURE: 127 MMHG

## 2025-06-16 LAB
ANION GAP SERPL CALC-SCNC: 6 MMOL/L (ref 0–18)
BUN BLD-MCNC: 38 MG/DL (ref 9–23)
BUN/CREAT SERPL: 23.8 (ref 10–20)
CALCIUM BLD-MCNC: 8.9 MG/DL (ref 8.7–10.4)
CHLORIDE SERPL-SCNC: 102 MMOL/L (ref 98–112)
CO2 SERPL-SCNC: 32 MMOL/L (ref 21–32)
CREAT BLD-MCNC: 1.6 MG/DL (ref 0.7–1.3)
EGFRCR SERPLBLD CKD-EPI 2021: 43 ML/MIN/1.73M2 (ref 60–?)
GLUCOSE BLD-MCNC: 105 MG/DL (ref 70–99)
OSMOLALITY SERPL CALC.SUM OF ELEC: 299 MOSM/KG (ref 275–295)
POTASSIUM SERPL-SCNC: 3.9 MMOL/L (ref 3.5–5.1)
SODIUM SERPL-SCNC: 140 MMOL/L (ref 136–145)

## 2025-06-16 PROCEDURE — 99239 HOSP IP/OBS DSCHRG MGMT >30: CPT | Performed by: HOSPITALIST

## 2025-06-16 RX ORDER — METOPROLOL SUCCINATE 25 MG/1
25 TABLET, EXTENDED RELEASE ORAL DAILY
Qty: 90 TABLET | Refills: 0 | Status: SHIPPED | OUTPATIENT
Start: 2025-06-16

## 2025-06-16 RX ORDER — BUMETANIDE 1 MG/1
1 TABLET ORAL DAILY
Qty: 30 TABLET | Refills: 0 | Status: SHIPPED | OUTPATIENT
Start: 2025-06-17

## 2025-06-16 NOTE — PLAN OF CARE
AxOx4 forgetful, RA, NSR, Cont with accidents, x1 walker  PLAN: possible discharge to [facility], needs outpatient PT/OT scripts    Problem: Patient Centered Care  Goal: Patient preferences are identified and integrated in the patient's plan of care  Description: Interventions:  - What would you like us to know as we care for you? Per wife, patient is not allowed dairy as per GI MD  - Provide timely, complete, and accurate information to patient/family  - Incorporate patient and family knowledge, values, beliefs, and cultural backgrounds into the planning and delivery of care  - Encourage patient/family to participate in care and decision-making at the level they choose  - Honor patient and family perspectives and choices  Outcome: Progressing     Problem: Patient/Family Goals  Goal: Patient/Family Long Term Goal  Description: Patient's Long Term Goal: ensure medications work well and don't need to be changed by MD    Interventions:  - monitor new medications and give education  - ensure patient and wife understand importance of diet and fluid intake  - See additional Care Plan goals for specific interventions  Outcome: Progressing  Goal: Patient/Family Short Term Goal  Description: Patient's Short Term Goal: get rid of leg swelling    Interventions:   - monitor labs  - monitor intake and output  - diuretic medication as per MD order  - See additional Care Plan goals for specific interventions  Outcome: Progressing     Problem: CARDIOVASCULAR - ADULT  Goal: Maintains optimal cardiac output and hemodynamic stability  Description: INTERVENTIONS:  - Monitor vital signs, rhythm, and trends  - Monitor for bleeding, hypotension and signs of decreased cardiac output  - Evaluate effectiveness of vasoactive medications to optimize hemodynamic stability  - Monitor arterial and/or venous puncture sites for bleeding and/or hematoma  - Assess quality of pulses, skin color and temperature  - Assess for signs of decreased  coronary artery perfusion - ex. Angina  - Evaluate fluid balance, assess for edema, trend weights  Outcome: Progressing  Goal: Absence of cardiac arrhythmias or at baseline  Description: INTERVENTIONS:  - Continuous cardiac monitoring, monitor vital signs, obtain 12 lead EKG if indicated  - Evaluate effectiveness of antiarrhythmic and heart rate control medications as ordered  - Initiate emergency measures for life threatening arrhythmias  - Monitor electrolytes and administer replacement therapy as ordered  Outcome: Progressing     Problem: RESPIRATORY - ADULT  Goal: Achieves optimal ventilation and oxygenation  Description: INTERVENTIONS:  - Assess for changes in respiratory status  - Assess for changes in mentation and behavior  - Position to facilitate oxygenation and minimize respiratory effort  - Oxygen supplementation based on oxygen saturation or ABGs  - Provide Smoking Cessation handout, if applicable  - Encourage broncho-pulmonary hygiene including cough, deep breathe, Incentive Spirometry  - Assess the need for suctioning and perform as needed  - Assess and instruct to report SOB or any respiratory difficulty  - Respiratory Therapy support as indicated  - Manage/alleviate anxiety  - Monitor for signs/symptoms of CO2 retention  Outcome: Progressing     Problem: METABOLIC/FLUID AND ELECTROLYTES - ADULT  Goal: Electrolytes maintained within normal limits  Description: INTERVENTIONS:  - Monitor labs and rhythm and assess patient for signs and symptoms of electrolyte imbalances  - Administer electrolyte replacement as ordered  - Monitor response to electrolyte replacements, including rhythm and repeat lab results as appropriate  - Fluid restriction as ordered  - Instruct patient on fluid and nutrition restrictions as appropriate  Outcome: Progressing  Goal: Hemodynamic stability and optimal renal function maintained  Description: INTERVENTIONS:  - Monitor labs and assess for signs and symptoms of volume excess  or deficit  - Monitor intake, output and patient weight  - Monitor urine specific gravity, serum osmolarity and serum sodium as indicated or ordered  - Monitor response to interventions for patient's volume status, including labs, urine output, blood pressure (other measures as available)  - Encourage oral intake as appropriate  - Instruct patient on fluid and nutrition restrictions as appropriate  Outcome: Progressing

## 2025-06-16 NOTE — PLAN OF CARE
Pt discharged home with wife (Brook ARIAS) in stable condition. IV and tele removed. AVS printed and explained. Outpatient PT/OT orders given. Left via car.

## 2025-06-16 NOTE — DISCHARGE SUMMARY
[facility]  Discharge Summary     Prashanth Caceres  : 10/21/1940    Status: Inpatient  Day #: 6    Attending: Dg Pulido MD  PCP: TOMMIE HOLLIS MD     Date of Admission:  2025  Date of Discharge:  2025     Hospital Discharge Diagnoses:     Acute on chronic diastolic heart failure   LLE cellulitis  Paroxysmal afib  H/o CVA  CKD 2  Aortic stenosis  Bipolar d/o  Morbid obesity Body mass index is 39.17 kg/m².  H/o PE      History of Present Illness:     Copied from Admission H&P:    Prashanth Caceres is a(n) 84 year old male, with a past medical history significant for diastolic heart failure, PE, bipolar mood disorder presents with complaint of increasing lower extremity swelling over the last few days.  Patient claims he was told by his cardiologist to stop taking Lasix and was started on spironolactone however note clearly indicates Lasix dose was to be increased.  Denies any chest pain shortness of breath orthopnea or PND.      Hospital Course:     Acute on chronic diastolic heart failure  - Secondary to poor compliance with diuretics. Edema much improved  - cardiology on consult  - received lasix IV - transitioned to oral bumex  - cont aldactone (new medication)  - I/O Net negative 2.8L since admission     Paroxysmal afib  H/o CVA  - cont eliquis  - cont metoprolol     LLE cellulitis  - resolved  - completed course of ancef IV     CKD stage II  Avoid nephrotoxic medications  - monitor bmp with diuresis - stable today     Hx of aortic stenosis     Bipolar mood disorder  Cont home meds     Morbid obesity with obstructive sleep apnea  BMI 41.2.       Hx of PE  - cont eliquis     Dispo:  Stable for discharge today. Plans for onsite PT at patient's ILF apartment at [facility].      Consultants         Provider   Role Specialty     Calvin Casey MD      Consulting Physician Cardiovascular Diseases             Physical Exam:   Blood pressure 127/75, pulse 65, temperature 98.3 °F (36.8 °C),  temperature source Oral, resp. rate 18, height 5' 8\" (1.727 m), weight 257 lb 9.6 oz (116.8 kg), SpO2 94%.  General:  Alert, no distress  HEENT:  Normocephalic, atraumatic  Cardiac:  Regular rate, regular rhythm  Pulmonary:  Clear to auscultation bilaterally, respirations unlabored  Gastrointestinal:  Soft, non-tender, normal bowel sounds  Musculoskeletal:  No joint swelling  Extremities:  LE edema improved. LLE erythema resolved.   Neurologic:  nonfocal  Psychiatric:  Normal affect, calm and appropriate         Discharge Medications        CHANGE how you take these medications        Instructions Prescription details   bumetanide 1 MG Tabs  Commonly known as: Bumex  Start taking on: June 17, 2025  What changed:   when to take this  additional instructions      Take 1 tablet (1 mg total) by mouth daily.   Quantity: 30 tablet  Refills: 0            CONTINUE taking these medications        Instructions Prescription details   acetaminophen 500 MG Tabs  Commonly known as: Tylenol Extra Strength      Take 1 tablet (500 mg total) by mouth every 6 (six) hours as needed for Fever (equal to or greater than 100.4).   Quantity: 1 tablet  Refills: 0     apixaban 2.5 MG Tabs  Commonly known as: Eliquis      Take 1 tablet (2.5 mg total) by mouth 2 (two) times daily.   Refills: 0     busPIRone 5 MG Tabs  Commonly known as: Buspar      Take 1 tablet (5 mg total) by mouth 2 (two) times daily.   Quantity: 180 tablet  Refills: 1     DULoxetine 60 MG Cpep  Commonly known as: Cymbalta      Take 1 capsule (60 mg total) by mouth daily. Take a total of 90 mg per day   Quantity: 90 capsule  Refills: 1     DULoxetine 30 MG Cpep  Commonly known as: Cymbalta      Take one capsule with duloxetine 60mg for a total dose of 90mg daily   Quantity: 90 capsule  Refills: 1     ferrous sulfate 325 (65 FE) MG Tbec      Take 1 tablet (325 mg total) by mouth daily with breakfast.   Refills: 0     melatonin 3 MG Tabs      Take 0.5 tablets (1.5 mg total) by  mouth nightly.   Refills: 0     memantine 10 MG Tabs  Commonly known as: Namenda      Take 1 tablet (10 mg total) by mouth in the morning and 1 tablet (10 mg total) before bedtime.   Refills: 0     methylphenidate 10 MG Tabs  Commonly known as: Ritalin      Take 0.5 tablets (5 mg total) by mouth daily.   Quantity: 30 tablet  Refills: 0     metoprolol succinate ER 25 MG Tb24  Commonly known as: Toprol XL      Take 1 tablet (25 mg total) by mouth daily. Take one tablet (25mg total) by mouth daily   Quantity: 90 tablet  Refills: 1     Myrbetriq 50 MG Tb24  Generic drug: Mirabegron ER      Take 1 tablet (50 mg total) by mouth in the morning.   Refills: 0     omeprazole 20 MG Cpdr  Commonly known as: PriLOSEC      Take 2 capsules (40 mg total) by mouth every morning before breakfast.   Refills: 0     rosuvastatin 20 MG Tabs  Commonly known as: Crestor      Take 1 tablet (20 mg total) by mouth daily.   Quantity: 90 tablet  Refills: 3     spironolactone 25 MG Tabs  Commonly known as: Aldactone      Take 1 tablet (25 mg total) by mouth daily.   Refills: 0     tamsulosin 0.4 MG Caps  Commonly known as: Flomax      Take 1 capsule (0.4 mg total) by mouth in the morning.   Refills: 0     tolterodine 2 MG Tabs  Commonly known as: Detrol      Take 2 tablets (4 mg total) by mouth in the morning.   Refills: 0            STOP taking these medications      Loperamide HCl 2 MG Tabs  Commonly known as: IMODIUM A-D        Mesalamine 4 g Enem                  Where to Get Your Medications        These medications were sent to [facility]/pharmacy #[id number] - [place], [place] - [address] AT [facility], [phone], [phone]  [address], [place] [address]      Phone: [phone]   bumetanide 1 MG Tabs           Hospital Discharge Diagnoses: Acute on chronic diastolic heart failure     Lace+ Score: 77  59-90 High Risk  29-58 Medium Risk  0-28   Low Risk.    TCM Follow-Up Recommendation:  LACE > 58: High Risk of  readmission after discharge from the hospital.        I spent >30 minutes on this discharge. Discussed treatment and discharge plans.       Dg Pulido MD

## 2025-06-16 NOTE — PROGRESS NOTES
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         Problem List[1]    Objective:   Temp: 98.1 °F (36.7 °C)  Pulse: 65  Resp: 18  BP: 147/78    Intake/Output:     Intake/Output Summary (Last 24 hours) at 2025 0720  Last data filed at 2025 0501  Gross per 24 hour   Intake 1000 ml   Output 600 ml   Net 400 ml       Last 3 Weights   25 0306 257 lb 9.6 oz (116.8 kg)   06/15/25 0500 263 lb (119.3 kg)   25 0346 259 lb 4.8 oz (117.6 kg)   25 0438 261 lb 14.4 oz (118.8 kg)   25 0502 262 lb 3.2 oz (118.9 kg)   25 0509 265 lb (120.2 kg)   06/10/25 0530 262 lb 1.6 oz (118.9 kg)   25 0152 271 lb 6.4 oz (123.1 kg)   25 2112 272 lb (123.4 kg)   23 1347 278 lb (126.1 kg)   23 2242 250 lb 12.8 oz (113.8 kg)   23 1313 270 lb (122.5 kg)         Physical Exam:    General: Alert. No apparent distress. No respiratory or constitutional distress.  HEENT: Normocephalic, anicteric sclera  Neck: supple  Cardiac: Regular rate and rhythm. S1, S2  normal. No murmur, pericardial rub, S3, thrill, heave or extra cardiac sounds.  Lungs: Clear without wheezes, rales, rhonchi or dullness.  Normal excursions and effort.  Abdomen: Soft, non-distended  Extremities: trace edema  Neurologic: Alert, normal affect. No motor or coordinational deficit.  Skin: Warm and dry.     Laboratory/Data:    Labs:         Recent Labs   Lab 06/10/25  0651 06/12/25  0644   WBC 7.0 7.2   HGB 16.2 16.1   MCV 93.9 95.5   .0 167.0       Recent Labs   Lab 06/12/25  0644 06/12/25  1446 06/13/25  0633 06/14/25  0815 06/15/25  0942    139 141 140 139   K 3.5  3.5 4.1 3.7 3.6 3.9    103 101 102 103   CO2 29.0 28.0 30.0 32.0 28.0   BUN 28* 29* 31* 35* 32*   CREATSERUM 1.49* 1.60* 1.62* 1.63* 1.70*   CA 8.6* 9.1 8.7 8.9 9.1   * 92 103* 110* 125*       No results for input(s): \"ALT\", \"AST\", \"ALB\", \"AMYLASE\", \"LIPASE\", \"LDH\" in the last 168 hours.    Invalid input(s): \"ALPHOS\", \"TBIL\", \"DBIL\", \"TPROT\"      No results for input(s): \"TROP\" in the last 168 hours.        ECHO 5/25:  1.  Left ventricle: The cavity size is normal. There is mild concentric       hypertrophy. Systolic function is normal. The estimated ejection       fraction is 55-60%. Wall motion is normal; there are no regional wall       motion abnormalities. Grade I diastolic dysfunction.   2.  Aortic valve: There is mild calcification. There is mild stenosis. There       is trace regurgitation. The peak systolic velocity is 2.7m/sec. The mean       systolic gradient is 18mm Hg. The peak systolic gradient is 30mm Hg. The       valve area is 1.6cm^2.   3.  Systemic arteries: The ascending aorta A-P end-diastolic diameter is       4.0cm.   4.  Ascending aorta: The vessel is mildly dilated.   5.  Mitral valve: The annulus is mildly calcified. There is mild       regurgitation.   6.  Left atrium: The atrium is mildly to moderately dilated.   7.  Right ventricle: The cavity size is mildly increased. Wall thickness  is       normal. Systolic function is normal. Estimation of the right ventricular       systolic pressure is mildly to moderately increased. The RV pressure       during systole is 38mm Hg.   8.  Right atrium: The atrium is mildly dilated. There is a small,       hyperechoic, relatively fixed mass on the right side of the interatrial       septum. The appearance is most consistent with myxoma.   9.  Tricuspid valve: There is mild-moderate regurgitation.   10. Pericardium, extracardiac: There is no significant pericardial effusion.   Impressions:     - Prior study date: 6/7/23.   - Largely similar. 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           Allergies:   Allergies[2]    Medications:  Current Hospital Medications[3]             [1]   Patient Active Problem List  Diagnosis    Acute diverticulitis    Chronic kidney disease    Hypertension    Bipolar affective (HCC)    High cholesterol    ESTHER on CPAP    Dyspnea    Weight gain    Morbid obesity with BMI of 40.0-44.9, adult ([facility])    History of depression    History of stroke    Acute kidney injury    Hypokalemia    Coronary artery disease involving native coronary artery of native heart without angina pectoris    Chronic heart failure with preserved ejection fraction (HCC)    Gastrointestinal hemorrhage, unspecified gastrointestinal hemorrhage type    Blood loss anemia    Peripheral edema    Heart murmur   [2]   Allergies  Allergen Reactions    Cat Hair Extract OTHER (SEE COMMENTS)    Radiology Contrast Iodinated Dyes OTHER (SEE COMMENTS)    Other ITCHING     Cat Dander   [3]   Current Facility-Administered Medications   Medication Dose Route Frequency    bumetanide (Bumex) tab 1 mg  1 mg Oral Daily    spironolactone (Aldactone) tab 25 mg  25 mg Oral Daily    metoprolol succinate ER (Toprol XL) 24 hr tab 25 mg  25 mg Oral Daily Beta Blocker    busPIRone (Buspar) tab 5 mg  5 mg Oral BID    DULoxetine (Cymbalta) DR cap 90 mg  90 mg Oral Daily    memantine (Namenda) tab  10 mg  10 mg Oral BID    rosuvastatin (Crestor) tab 20 mg  20 mg Oral Daily    oxybutynin (Ditropan) tab 5 mg  5 mg Oral BID    ondansetron (Zofran) 4 MG/2ML injection 4 mg  4 mg Intravenous Q6H PRN    acetaminophen (Tylenol) tab 650 mg  650 mg Oral Q6H PRN    hydrALAzine (Apresoline) 20 mg/mL injection 10 mg  10 mg Intravenous Q4H PRN    zolpidem (Ambien) tab 5 mg  5 mg Oral Nightly PRN    alum-mag hydroxide-simethicone (Maalox) 200-200-20 MG/5ML oral suspension 30 mL  30 mL Oral QID PRN    pantoprazole (Protonix) DR tab 40 mg  40 mg Oral QAM AC    haloperidol lactate (Haldol) 5 MG/ML injection 2 mg  2 mg Intravenous Q6H PRN    ceFAZolin (Ancef) 2g in 10mL IV syringe premix  2 g Intravenous Q8H    minerin (Eucirin) cream   Topical Daily    apixaban (Eliquis) tab 5 mg  5 mg Oral BID

## 2025-06-18 NOTE — PAYOR COMM NOTE
--------------  CONTINUED STAY REVIEW    Payor: [employer]  Subscriber #:  [id number]  Authorization Number: [id number]    Admit date: 6/10/25  Admit time:  3:07 PM        PLEASE REVIEW ADDITIONAL CLINICAL INFORMATION. REQUESTING RECONSIDERATION OF INPATIENT STATUS      OBSERVATION:  6/8/25  INPATIENT:   6/10/25      6/14/25     Assessment and Plan:     Acute on chronic diastolic heart failure  - Secondary to poor compliance with diuretics. Still with some fluid overload but making progress.  - cardiology on consult  - cont diuresis with IV lasix - d/w cardiology - received IV dose this morning. Will give another dose later today and re-assess tomorrow.   - cont aldactone (new medication)  - monitor I's and O's and daily weights. Net negative 3.6L since admission     Paroxysmal afib  H/o CVA  - cont eliquis  - cont metoprolol     LLE cellulitis  - cont IV ancef  - erythema improving     CKD stage II  Avoid nephrotoxic medications  - monitor bmp with diuresis -stable today     Hx of aortic stenosis     Bipolar mood disorder  Cont home meds     Morbid obesity with obstructive sleep apnea  BMI 41.2.       Hx of PE  - cont eliquis     Temp:  [97.8 °F (36.6 °C)-98.7 °F (37.1 °C)] 98 °F (36.7 °C)  Pulse:  [58-73] 62  Resp:  [16-18] 16  BP: (103-124)/(72-84) 114/72  SpO2:  [91 %-94 %] 93 %  General:  Alert  HEENT:  Normocephalic, atraumatic  Cardiac:  Regular rate, regular rhythm  Pulmonary:  Clear to auscultation bilaterally, respirations unlabored  Gastrointestinal:  Soft, non-tender, normal bowel sounds  Musculoskeletal:  No joint swelling  Extremities:  b/l LE edema. LLE erythema improved  Neurologic:  nonfocal  Psychiatric:  Normal affect, calm and appropriate     Lab 06/08/25  2148 06/10/25  0651 06/12/25  0644   WBC 8.0 7.0 7.2   HGB 15.4 16.2 16.1   HCT 46.6 48.9 48.3   .0 158.0 167.0   RBC 4.81 5.21 5.06   MCV 96.9 93.9 95.5   MCH 32.0 31.1 31.8   MCHC 33.0 33.1 33.3   RDW 13.0 13.0 12.9   NEPRELIM  6.37 4.60 4.75      Lab 06/08/25  2148 06/12/25  1446 06/13/25  0633 06/14/25  0815   BUN 21 29* 31* 35*   CREATSERUM 1.35* 1.60* 1.62* 1.63*   CA 8.9 9.1 8.7 8.9   ALB 3.8  --   --   --     139 141 140   K 4.1 4.1 3.7 3.6    103 101 102   CO2 27.0 28.0 30.0 32.0   * 92 103* 110*   BILT 0.5  --   --   --    AST 23  --   --   --    ALT 21  --   --   --    ALKPHO 115  --   --   --    TP 5.9  --   --   --      [Scheduled Medications]   furosemide  40 mg Intravenous Daily    spironolactone  25 mg Oral Daily    metoprolol succinate  25 mg Oral Daily Beta Blocker    busPIRone  5 mg Oral BID    DULoxetine  90 mg Oral Daily    memantine  10 mg Oral BID    rosuvastatin  20 mg Oral Daily    oxybutynin  5 mg Oral BID    pantoprazole  40 mg Oral QAM AC    ceFAZolin  2 g Intravenous Q8H    minerin   Topical Daily    apixaban  5 mg Oral BID              CARDIOLOGY  Impression/Plan:  84 year old male presenting with:     1) HFpEF, acute on chronic (EF 55-60% 5/2025)  2) AF on eliquis  3) CVA  4) HTN, controlled  5) HL, on statin  6) CKD  7) Dementia  8) Prostate ca  9) RA myxoma, 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 Net negative 3.1 L  - baseline creatinine 1.4-1.5. currently 1.63 and stable. May need to tolerate slightly lower GFR to allow adequate diuresis  - spironolactone 25mg daily added (this was planned in the outpatient setting, however appears instructions misconstrued)  - Cont statin, beta blocker  - Cont eliquis for stroke prevention (monitor renal function, dose accordingly)  - Abx as per primary service  - Monitor on tele          6/15/25           Assessment and Plan:     Acute on chronic diastolic heart failure  - Secondary to poor compliance with diuretics. Still with some fluid overload but making progress.  - cardiology on consult  - received lasix IV - transition to oral bumex today. To monitor another day per cardiology.  - cont aldactone  (new medication)  - monitor I's and O's and daily weights. Net negative 3.8L since admission     Paroxysmal afib  H/o CVA  - cont eliquis  - cont metoprolol     LLE cellulitis  - cont IV ancef  - erythema improving     CKD stage II  Avoid nephrotoxic medications  - monitor bmp with diuresis - stable today     Hx of aortic stenosis     Bipolar mood disorder  Cont home meds     Morbid obesity with obstructive sleep apnea  BMI 41.2.       Hx of PE  - cont eliquis          Temp:  [97.5 °F (36.4 °C)-98.1 °F (36.7 °C)] 97.5 °F (36.4 °C)  Pulse:  [59-69] 59  Resp:  [18-20] 20  BP: (115-151)/(73-82) 140/73  SpO2:  [92 %-96 %] 92 %  General:  Alert  HEENT:  Normocephalic, atraumatic  Cardiac:  Regular rate, regular rhythm  Pulmonary:  Clear to auscultation bilaterally, respirations unlabored  Gastrointestinal:  Soft, non-tender, normal bowel sounds  Musculoskeletal:  No joint swelling  Extremities:  b/l LE edema. LLE erythema improved  Neurologic:  nonfocal  Psychiatric:  Normal affect, calm and appropriate     Lab 06/08/25  2148 06/10/25  0651 06/12/25  0644   WBC 8.0 7.0 7.2   HGB 15.4 16.2 16.1   HCT 46.6 48.9 48.3   .0 158.0 167.0   RBC 4.81 5.21 5.06   MCV 96.9 93.9 95.5   MCH 32.0 31.1 31.8   MCHC 33.0 33.1 33.3   RDW 13.0 13.0 12.9   NEPRELIM 6.37 4.60 4.75      Lab 06/08/25  2148 06/12/25  1446 06/13/25  0633 06/14/25  0815   BUN 21 29* 31* 35*   CREATSERUM 1.35* 1.60* 1.62* 1.63*   CA 8.9 9.1 8.7 8.9   ALB 3.8  --   --   --     139 141 140   K 4.1 4.1 3.7 3.6    103 101 102   CO2 27.0 28.0 30.0 32.0   * 92 103* 110*   BILT 0.5  --   --   --    AST 23  --   --   --    ALT 21  --   --   --    ALKPHO 115  --   --   --    TP 5.9  --   --   --        [Scheduled Medications]   bumetanide  2 mg Oral Daily    spironolactone  25 mg Oral Daily    metoprolol succinate  25 mg Oral Daily Beta Blocker    busPIRone  5 mg Oral BID    DULoxetine  90 mg Oral Daily    memantine  10 mg Oral BID    rosuvastatin   20 mg Oral Daily    oxybutynin  5 mg Oral BID    pantoprazole  40 mg Oral QAM AC    ceFAZolin  2 g Intravenous Q8H    minerin   Topical Daily    apixaban  5 mg Oral BID                     DATE OF DISCHARGE: 6/16/25  Discharge Summary        Date of Admission:  6/8/2025  Date of Discharge:  6/16/2025      Hospital Discharge Diagnoses:      Acute on chronic diastolic heart failure   LLE cellulitis  Paroxysmal afib  H/o CVA  CKD 2  Aortic stenosis  Bipolar d/o  Morbid obesity Body mass index is 39.17 kg/m².  H/o PE      History of Present Illness:      Copied from Admission H&P:     Prashanth Caceres is a(n) 84 year old male, with a past medical history significant for diastolic heart failure, PE, bipolar mood disorder presents with complaint of increasing lower extremity swelling over the last few days.  Patient claims he was told by his cardiologist to stop taking Lasix and was started on spironolactone however note clearly indicates Lasix dose was to be increased.  Denies any chest pain shortness of breath orthopnea or PND.      Hospital Course:      Acute on chronic diastolic heart failure  - Secondary to poor compliance with diuretics. Edema much improved  - cardiology on consult  - received lasix IV - transitioned to oral bumex  - cont aldactone (new medication)  - I/O Net negative 2.8L since admission     Paroxysmal afib  H/o CVA  - cont eliquis  - cont metoprolol     LLE cellulitis  - resolved  - completed course of ancef IV     CKD stage II  Avoid nephrotoxic medications  - monitor bmp with diuresis - stable today     Hx of aortic stenosis     Bipolar mood disorder  Cont home meds     Morbid obesity with obstructive sleep apnea  BMI 41.2.       Hx of PE  - cont eliquis     Dispo:  Stable for discharge today. Plans for onsite PT at patient's ILF apartment at [facility].       Physical Exam:   Blood pressure 127/75, pulse 65, temperature 98.3 °F (36.8 °C), temperature source Oral, resp. rate 18, height 5' 8\"  (1.727 m), weight 257 lb 9.6 oz (116.8 kg), SpO2 94%.  General:  Alert, no distress  HEENT:  Normocephalic, atraumatic  Cardiac:  Regular rate, regular rhythm  Pulmonary:  Clear to auscultation bilaterally, respirations unlabored  Gastrointestinal:  Soft, non-tender, normal bowel sounds  Musculoskeletal:  No joint swelling  Extremities:  LE edema improved. LLE erythema resolved.   Neurologic:  nonfocal  Psychiatric:  Normal affect, calm and appropriate     Medications 06/08 06/09 06/10 06/11 06/12 06/13 06/14 06/15 06/16      apixaban (Eliquis) tab 2.5 mg  Dose: 2.5 mg  Freq: Once Route: OR  Start: 06/08/25 2356 End: 06/09/25 0011     [id number]               furosemide (Lasix) 10 mg/mL injection 40 mg  Dose: 40 mg  Freq: Once Route: IV  Start: 06/14/25 1800 End: 06/14/25 1723          [id number]          furosemide (Lasix) 10 mg/mL injection 40 mg  Dose: 40 mg  Freq: Once Route: IV  Start: 06/08/25 2248 End: 06/08/25 2313    97724                loperamide (Imodium) cap 2 mg  Dose: 2 mg  Freq: Once Route: OR  Start: 06/08/25 2356 End: 06/09/25 0011   Admin Instructions:   Maximum daily dose is 16 mg/day     43555               melatonin tab 2 mg  Dose: 2 mg  Freq: Once Route: OR  Start: 06/08/25 2358 End: 06/09/25 0013     17541               memantine (Namenda) tab 10 mg  Dose: 10 mg  Freq: Once Route: OR  Start: 06/08/25 2356 End: 06/09/25 0017     56116               potassium chloride (Klor-Con M20) tab 40 mEq  Dose: 40 mEq  Freq: Once Route: OR  Start: 06/11/25 1000 End: 06/11/25 1008   Admin Instructions:   Do not crush       45121             potassium chloride (Klor-Con M20) tab 40 mEq  Dose: 40 mEq  Freq: Once Route: OR  Start: 06/10/25 1400 End: 06/10/25 1537   Admin Instructions:   Do not crush      49065                 Medications 06/08 06/09 06/10 06/11 06/12 06/13 06/14 06/15 06/16   apixaban (Eliquis) tab 5 mg  Dose: 5 mg  Freq: 2 times daily Route: OR  Start: 06/09/25 2100 End: 06/16/25 3992 24598       [id number]     571629      285381     504963      800603     530852      591953     153279      885317     603839      094784     328072      565715        bumetanide (Bumex) tab 1 mg  Dose: 1 mg  Freq: Daily Route: OR  Start: 06/15/25 1100 End: 06/16/25 1454   Admin Instructions:   Please wait to administer until BMP is resulted. Ok to give if renal function stable           706085      437731        busPIRone (Buspar) tab 5 mg  Dose: 5 mg  Freq: 2 times daily Route: OR  Start: 06/09/25 0900 End: 06/16/25 1454     454590     250126      652010     772666      073984     013605      752844     504102      567617     031753      071712     551329      416803     577690      246210        ceFAZolin (Ancef) 2g in 10mL IV syringe premix  Dose: 2 g  Freq: Every 8 hours Route: IV  Start: 06/09/25 1245 End: 06/16/25 1454   Order specific questions:        [id number]     012418     589994     004479      125457     273215     695516      424358     916120     678503      789681     487786     542086      228315     140098     977590      477978     764665     927915      885169     336487     186440      904632     (4042)65        DULoxetine (Cymbalta) DR cap 90 mg  Dose: 90 mg  Freq: Daily Route: OR  Start: 06/09/25 0930 End: 06/16/25 1454   Admin Instructions:   Capsule may be opened; do not crush contents. May mix with applesauce     105696      155916      742952      331067      916839      149610      079463      392546        furosemide (Lasix) 10 mg/mL injection 40 mg  Dose: 40 mg  Freq: Daily Route: IV  Start: 06/13/25 0930 End: 06/14/25 1413         168500      849791          furosemide (Lasix) 10 mg/mL injection 40 mg  Dose: 40 mg  Freq: 2 times daily (diuretic) Route: IV  Start: 06/12/25 1700 End: 06/13/25 0730        233830            furosemide (Lasix) 10 mg/mL injection 40 mg  Dose: 40 mg  Freq: Daily Route: IV  Start: 06/09/25 0930 End: 06/12/25 1155     392226      790437      096884      916765             memantine (Namenda) tab 10 mg  Dose: 10 mg  Freq: 2 times daily Route: OR  Start: 06/09/25 0900 End: 06/16/25 1454     023471     461856      657699     458093      446821     202441      930986     015064      855802     002148      735907     396383      539732     671048      349281        metoprolol succinate ER (Toprol XL) 24 hr tab 25 mg  Dose: 25 mg  Freq: Daily Beta Blocker Route: OR  Start: 06/11/25 0600 End: 06/16/25 1454   Admin Instructions:   Do not crush       576412      322476      331072      995548      1128607      5776967        minerin (Eucirin) cream  Freq: Daily Route: TOP  Start: 06/09/25 1245 End: 06/16/25 1454   Order specific questions:        [id number]      5728319      2242500      6192067      9405635      8429372      0874796      3012931        oxybutynin (Ditropan) tab 5 mg  Dose: 5 mg  Freq: 2 times daily Route: OR  Start: 06/09/25 0900 End: 06/16/25 145     (0900)111     3854713      3363518     6279029      2979031     5848826      0558171     2305238      5257108     3138335      8981700     5457718      7498781     3807795      4586161        pantoprazole (Protonix) DR tab 40 mg  Dose: 40 mg  Freq: Every morning before breakfast Route: OR  Start: 06/09/25 0700 End: 06/16/25 1454   Admin Instructions:   Do not crush     (0830)126      8590777      6764078      7210470      0320413      2675120      2879345      3357965        rosuvastatin (Crestor) tab 20 mg  Dose: 20 mg  Freq: Daily Route: OR  Start: 06/09/25 0930 End: 06/16/25 1454     6836883      4348071      8886347      0895349      4821102      5206010      6918381      9587905        spironolactone (Aldactone) tab 25 mg  Dose: 25 mg  Freq: Daily Route: OR  Start: 06/11/25 0930 End: 06/16/25 1454   Admin Instructions:   CAUTION: HAZARDOUS DRUG       [id number]      2003237      8096881      9742096      8374732      6447584            Vitals (last day) before discharge       Date/Time Temp Pulse Resp BP SpO2 Weight O2 Device  O2 Flow Rate (L/min) [facility]    06/16/25 0839 98.3 °F (36.8 °C) 65 18 127/75 94 % -- None (Room air) -- SY    06/16/25 0531 98.1 °F (36.7 °C) 65 -- 147/78 97 % -- None (Room air) -- HF    06/16/25 0306 -- -- -- -- -- 257 lb 9.6 oz (116.8 kg) -- -- LK    06/15/25 2017 98 °F (36.7 °C) 66 18 121/90 95 % -- None (Room air) -- HF    06/15/25 1534 98.1 °F (36.7 °C) 66 20 114/68 94 % -- None (Room air) -- KB

## (undated) DEVICE — KIT ENDO ORCAPOD 160/180/190

## (undated) DEVICE — FIAPC® PROBE W/ FILTER 2200 A OD 2.3MM/6.9FR; L 2.2M/7.2FT: Brand: ERBE

## (undated) DEVICE — ADVANCE CAPSULE DELIVERY DEVIC

## (undated) DEVICE — KIT CLEAN ENDOKIT 1.1OZ GOWNX2

## (undated) DEVICE — CONMED SCOPE SAVER BITE BLOCK, 20X27 MM: Brand: SCOPE SAVER

## (undated) DEVICE — 60 ML SYRINGE REGULAR TIP: Brand: MONOJECT

## (undated) DEVICE — MEDI-VAC NON-CONDUCTIVE SUCTION TUBING: Brand: CARDINAL HEALTH

## (undated) DEVICE — CAPSULE ENDO PILL CAM SB3

## (undated) DEVICE — Device: Brand: DUAL NARE NASAL CANNULAE FEMALE LUER CON 7FT O2 TUBE

## (undated) DEVICE — YANKAUER SUCTION INSTRUMENT NO CONTROL VENT, BULB TIP, CLEAR: Brand: YANKAUER

## (undated) DEVICE — MEDI-VAC NON-CONDUCTIVE SUCTION TUBING 6MM X 1.8M (6FT.) L: Brand: CARDINAL HEALTH

## (undated) NOTE — LETTER
1/10/2021  [address] [place] [id number]    Dear Bernardo Johnson,       Here are your results from your recent visit with Gastroenterology. Anemia has resolved. Hemoglobin is 13.3. See Dr. Geri Figueroa in February.       If you need any furthe

## (undated) NOTE — MR AVS SNAPSHOT
[address] [facility]  [phone]               Thank you for choosing us for your health care visit with Damian Gunter MD.  We are glad to serve you and happy to provide you with this summar physician's office. At that time, you will be provided with any authorization numbers or be assured that none are required. You can then schedule your appointment.  Failure to obtain required authorization numbers can create reimbursement difficulties for y Take 450 mg by mouth nightly. Commonly known as:  TRILEPTAL           * OXcarbazepine 600 MG Tabs   Take 600 mg by mouth nightly. Commonly known as:  TRILEPTAL           * OXcarbazepine 300 MG Tabs   Take 200 mg by mouth daily.    Commonly known as:  TR

## (undated) NOTE — LETTER
Hospital Discharge Documentation  Please phone to schedule a hospital follow up appointment. From: [address] Hospitalist's Office  Phone: [phone]    Patient discharged time/date: 7/21/2023  2:32 PM  Patient discharge disposition:  Home or Self Care       Discharge Summary - D/C Summary        Discharge Summary signed by Kirstie Torres MD at 7/24/2023  9:19 AM   Version 1 of 1      Author: Kirstie Torres MD Service: Hospitalist Author Type: Physician    Filed: 7/24/2023  9:19 AM Status: Signed    : Kirstie Torres MD (Physician)         [address]     Lou Ornelasan [id number]   YOB: 1940 [id number]         [facility]    PATIENT'S NAME: Lou Oliver   ATTENDING PHYSICIAN: Mica Hwang MD   PATIENT ACCOUNT#:   [id number]    LOCATION:  [address] [place] [address] Road #:   [id number]       YOB: 1940  ADMISSION DATE:       07/19/2023      DISCHARGE DATE:  07/21/2023    DISCHARGE SUMMARY    About 45 minutes were spent preparing this discharge. DISCHARGE DIAGNOSIS:  Upper gastrointestinal blood loss anemia. HISTORY AND HOSPITAL COURSE:  This is a very pleasant 82-year-old white male who presents with a history of melena, appeared to be related to upper GI bleeding with acute upper GI blood loss anemia. He was admitted to the hospital and seen by Cardiology. He was on Eliquis for atrial fibrillation. The Eliquis was held short term, and he saw Dr. Osman Mcintosh who performed an upper GI endoscopy then placed a pill to evaluate. The patient's pill endoscopy showed no obvious source of small bowel bleeding either. I recommend iron therapy and perhaps, I believe that the bleeding was related to red spots noted on the EGD and a large hiatal hernia. He was deemed stable to be discharged by GI and resume Eliquis in a few days. Follow up with Cardiology as needed.     PHYSICAL EXAMINATION:  VITAL SIGNS:  On discharge, temperature is 97.7, pulse 74, respiratory rate 18, blood pressure is 147/85, 94%. LUNGS:  Occasional rhonchi. HEART:  Normal S1, S2. No S3.  ABDOMEN:  Soft and nontender. EXTREMITIES:  Without calf tenderness. NEUROLOGIC:  He is alert, oriented, friendly, and cooperative. LABORATORY STUDIES:  Please see chart. ASSESSMENT AND PLAN:  1. Upper gastrointestinal blood loss anemia, perhaps from hiatal hernia or other source, could be iron deficiency. We will continue iron infusion as an outpatient and will be set up by Dr. Farzana Baer or his hematologist, and continue Protonix. 2.   History of atrial fibrillation, anticoagulated with Eliquis. Restart in a few days. 3.   Obesity with body mass index of 38.1. He may need ESTHER workup. 4.   Acute posthemorrhagic iron deficiency anemia. Patient is doing well. Continue iron infusions IV. 5.   Acute worsening of chronic kidney disease stage 3. Followup blood test as an outpatient. CONDITION ON DISCHARGE:  Stable. CODE STATUS:  Not discussed during this hospitalization. ACTIVITY:  No heavy exercise, otherwise as tolerated. DIET:  Cardiac. FOLLOWUP:   With Dr. Jaime Sin in 2 weeks, Dr. Farzana Baer in a few days. Please call for followup advice. See Dr. Varinder Gorman as needed in 2 weeks. DISCHARGE MEDICATIONS:  1. Bumex 1 mg every other day. 2.   Benadryl 50 mg prior to any procedure. 3.   Cymbalta 90 mg a day. 4.   Eliquis 2.5 mg twice a day to resume several days after discharge. Actually, I wrote it down for him to resume the Eliquis on 07/24/2023.  5.   Continue mesalamine 4 g rectal enema every Monday, Wednesday, and Friday. 6.   Methylphenidate 5 mg daily. 7.   Metoprolol 25 mg daily. 8.   Myrbetriq 50 mg daily. 9.   Potassium chloride 20 mEq daily. 10.   Rosuvastatin/Crestor 20 mg daily. 11.   Tamsulosin 0.4 mg daily. 12.   Tolterodine 4 mg daily. 13.   Tylenol 500 mg every 6 hours as needed.   Watch total daily Tylenol limit of 3 g.  14.   Omeprazole 20 mg twice a day. RISK OF READMISSION:  Moderate. TCM followup recommended. Dictated By Abraham Hwang MD  d: 07/22/2023 18:46:36  t: 07/22/2023 21:31:16  [facility] [id number]  LAS/    Electronically signed by Gale Patel MD on 7/24/2023  9:19 AM

## (undated) NOTE — IP AVS SNAPSHOT
[address]  [address] [phone]                Discharge Summary   5/23/2017    Ramez Solis           Admission Information        Provider Department    5/23/2017 Betty Lam MD [facility] 5sw/S aspirin 81 MG Chew   Last time this was given:  81 mg on 5/25/2017  8:32 AM   Next dose due:  05/31/17        Chew 81 mg by mouth daily.                             DULoxetine HCl 60 MG Cpep   Last time this was given:  150 mg on 5/30/2017  9:02 AM   Commo Take 200 mg by mouth daily. Rosuvastatin Calcium 10 MG Tabs   Last time this was given:  10 mg on 5/29/2017  9:40 PM   Commonly known as:  CRESTOR   Next dose due:  05/30/17-pm        Take 10 mg by mouth nightly. Follow up with Amy Cushing, MD In 1 week. Specialty:  [facility] information:    [facility]  [phone]        Immunization History as of 5/30/2017  Never Reviewed    No immunizations on file.       Emmanuel 2.7 (L) (05/30/17)  136 (05/30/17)  4.4 (05/30/17)  98      Pending Labs     Order Current Status    URINALYSIS WITH CULTURE REFLEX Collected (05/24/17 0555)      Radiology Exams     None      Patient Belongings       Most Recent Value    All belongings re Support Staff. Remember, MyChart is NOT to be used for urgent needs.   For medical emergencies, dial 911.             _____________________________________________________________________________    Medication Side Effects - Medications to be taken at home Use: Lower cholesterol, protect your heart   Most common side effects: Dizziness, constipation, abnormal liver function   What to report to your healthcare team:  Dizziness, muscle aches, constipation           Salicylates     Salicylates    aspirin 81 MG movement, drowsiness, shaking           Stimulant Medications     methylphenidate 10 MG Oral Tab         Use: Increase alertness and focus   Most common side effects: Agitation, rapid heart beat   What to report to your healthcare provider: Palpitations, a

## (undated) NOTE — ED AVS SNAPSHOT
Gabrielle Diana   MRN: [id number]    Department:  [facility] Emergency Department   Date of Visit:  8/30/2019           Disclosure     Insurance plans vary and the physician(s) referred by the ER may not be covered by your plan.  Please contact you within the next three months to obtain basic health screening including reassessment of your blood pressure.     IF THERE IS ANY CHANGE OR WORSENING OF YOUR CONDITION, CALL YOUR PRIMARY CARE PHYSICIAN AT ONCE OR RETURN IMMEDIATELY TO THE EMERGENCY DEPARTMEN

## (undated) NOTE — LETTER
[address] [place] [address]  Authorization for Invasive Procedures  1.  I hereby authorize Dr. Lexus Ingram           , my physician and whomever may be designated as the doctor's assistant, to perform the following operation and/or procedure: risks that can occur: fever and allergic reactions, hemolytic reactions, transmission of disease such as hepatitis, AIDS, cytomegalovirus (CMV), and flluid overload.  In the event that I wish to have autologous transfusions of my own blood, or a directed do of my physician.      Signature of Patient:  ________________________________________________ Date: _________Time: _________    Responsible person in case of minor or unconscious: _____________________________Relationship: ____________     Witness Signature

## (undated) NOTE — LETTER
[address] [facility] [[email or web address]] [address] [place], [place]  Authorization for Surgical Operation and Procedure                                                                                           I hereby authorize Asad Heck MD, my physician and his/her assistants (if applicable), which may include medical students, residents, and/or fellows, to perform the following surgical operation/ procedure and administer such anesthesia as may be determined necessary by my physician: Operation/Procedure name (s) ESOPHAGOGASTRODUODENOSCOPY (EGD) with possible capsule endoscopy on Trudy Duty   2. I recognize that during the surgical operation/procedure, unforeseen conditions may necessitate additional or different procedures than those listed above. I, therefore, further authorize and request that the above-named surgeon, assistants, or designees perform such procedures as are, in their judgment, necessary and desirable. 3.   My surgeon/physician has discussed prior to my surgery the potential benefits, risks and side effects of this procedure; the likelihood of achieving goals; and potential problems that might occur during recuperation. They also discussed reasonable alternatives to the procedure, including risks, benefits, and side effects related to the alternatives and risks related to not receiving this procedure. I have had all my questions answered and I acknowledge that no guarantee has been made as to the result that may be obtained. 4.   Should the need arise during my operation/procedure, which includes change of level of care prior to discharge, I also consent to the administration of blood and/or blood products. Further, I understand that despite careful testing and screening of blood or blood products by collecting agencies, I may still be subject to ill effects as a result of receiving a blood transfusion and/or blood products.   The following are some, but not all, of the potential risks that can occur: fever and allergic reactions, hemolytic reactions, transmission of diseases such as Hepatitis, AIDS and Cytomegalovirus (CMV) and fluid overload. In the event that I wish to have an autologous transfusion of my own blood, or a directed donor transfusion, I will discuss this with my physician. Check only if Refusing Blood or Blood Products  I understand refusal of blood or blood products as deemed necessary by my physician may have serious consequences to my condition to include possible death. I hereby assume responsibility for my refusal and release the hospital, its personnel, and my physicians from any responsibility for the consequences of my refusal.    o  Refuse   5. I authorize the use of any specimen, organs, tissues, body parts or foreign objects that may be removed from my body during the operation/procedure for diagnosis, research or teaching purposes and their subsequent disposal by hospital authorities. I also authorize the release of specimen test results and/or written reports to my treating physician on the hospital medical staff or other referring or consulting physicians involved in my care, at the discretion of the Pathologist or my treating physician. 6.   I consent to the photographing or videotaping of the operations or procedures to be performed, including appropriate portions of my body for medical, scientific, or educational purposes, provided my identity is not revealed by the pictures or by descriptive texts accompanying them. If the procedure has been photographed/videotaped, the surgeon will obtain the original picture, image, videotape or CD. The hospital will not be responsible for storage, release or maintenance of the picture, image, tape or CD.    7.   I consent to the presence of a  or observers in the operating room as deemed necessary by my physician or their designees.     8.   I recognize that in the event my procedure results in extended X-Ray/fluoroscopy time, I may develop a skin reaction. 9. If I have a Do Not Attempt Resuscitation (DNAR) order in place, that status will be suspended while in the operating room, procedural suite, and during the recovery period unless otherwise explicitly stated by me (or a person authorized to consent on my behalf). The surgeon or my attending physician will determine when the applicable recovery period ends for purposes of reinstating the DNAR order. 10. Patients having a sterilization procedure: I understand that if the procedure is successful the results will be permanent and it will therefore be impossible for me to inseminate, conceive, or bear children. I also understand that the procedure is intended to result in sterility, although the result has not been guaranteed. 11. I acknowledge that my physician has explained sedation/analgesia administration to me including the risk and benefits I consent to the administration of sedation/analgesia as may be necessary or desirable in the judgment of my physician. I CERTIFY THAT I HAVE READ AND FULLY UNDERSTAND THE ABOVE CONSENT TO OPERATION and/or OTHER PROCEDURE.     _________________________________________ _________________________________     ___________________________________  Signature of Patient     Signature of Responsible Person                   Printed Name of Responsible Person                              _________________________________________ ______________________________        ___________________________________  Signature of Witness         Date  Time         Relationship to Patient    STATEMENT OF PHYSICIAN My signature below affirms that prior to the time of the procedure; I have explained to the patient and/or his/her legal representative, the risks and benefits involved in the proposed treatment and any reasonable alternative to the proposed treatment.  I have also explained the risks and benefits involved in refusal of the proposed treatment and alternatives to the proposed treatment and have answered the patient's questions.  If I have a significant financial interest in a co-management agreement or a significant financial interest in any product or implant, or other significant relationship used in this procedure/surgery, I have disclosed this and had a discussion with my patient.     _______________________________________________________________ _____________________________  Justin Orf of Physician)                                                                                         (Date)                                   (Time)  Patient Name: Gonzalo Teague    : 10/21/1940   Printed: 2023      Medical Record #: [id number]                                              Page 1 of 1

## (undated) NOTE — LETTER
1/10/2021  [address] [place] [id number]    Dear Monserrat Porter,       Here are your results from your recent visit with Gastroenterology. Anemia has resolved. Hemoglobin is 13.3. See Dr. Lyn Chamorro in February.       If you need any furthe

## (undated) NOTE — LETTER
Hospital Discharge Documentation      From: [facility] Hospitalist's Office  Phone: [phone]    Patient discharged time/date: 2025 12:53 PM  Patient discharge disposition:  Home or Self Care       Discharge Summary - D/C Summary        Discharge Summary signed by Dg Pulido MD at 2025 11:27 AM  Version 1 of 1      Author: Dg Pulido MD Service: Hospitalist Author Type: Physician    Filed: 2025 11:27 AM Date of Service: 2025 11:22 AM Status: Signed    : Dg Pulido MD (Physician)         [facility]  Discharge Summary     Prashanth Caceres  : 10/21/1940    Status: Inpatient  Day #: 6    Attending: Dg Pulido MD  PCP: TOMMIE HOLLIS MD     Date of Admission:  2025  Date of Discharge:  2025     Hospital Discharge Diagnoses:     Acute on chronic diastolic heart failure   LLE cellulitis  Paroxysmal afib  H/o CVA  CKD 2  Aortic stenosis  Bipolar d/o  Morbid obesity Body mass index is 39.17 kg/m².  H/o PE      History of Present Illness:     Copied from Admission H&P:    Prashanth Caceres is a(n) 84 year old male, with a past medical history significant for diastolic heart failure, PE, bipolar mood disorder presents with complaint of increasing lower extremity swelling over the last few days.  Patient claims he was told by his cardiologist to stop taking Lasix and was started on spironolactone however note clearly indicates Lasix dose was to be increased.  Denies any chest pain shortness of breath orthopnea or PND.      Hospital Course:     Acute on chronic diastolic heart failure  - Secondary to poor compliance with diuretics. Edema much improved  - cardiology on consult  - received lasix IV - transitioned to oral bumex  - cont aldactone (new medication)  - I/O Net negative 2.8L since admission     Paroxysmal afib  H/o CVA  - cont eliquis  - cont metoprolol     LLE cellulitis  - resolved  - completed course of ancef IV     CKD stage II  Avoid nephrotoxic medications  -  monitor bmp with diuresis - stable today     Hx of aortic stenosis     Bipolar mood disorder  Cont home meds     Morbid obesity with obstructive sleep apnea  BMI 41.2.       Hx of PE  - cont eliquis     Dispo:  Stable for discharge today. Plans for onsite PT at patient's ILF apartment at [facility].      Consultants         Provider   Role Specialty     Calvin Casey MD      Consulting Physician Cardiovascular Diseases             Physical Exam:   Blood pressure 127/75, pulse 65, temperature 98.3 °F (36.8 °C), temperature source Oral, resp. rate 18, height 5' 8\" (1.727 m), weight 257 lb 9.6 oz (116.8 kg), SpO2 94%.  General:  Alert, no distress  HEENT:  Normocephalic, atraumatic  Cardiac:  Regular rate, regular rhythm  Pulmonary:  Clear to auscultation bilaterally, respirations unlabored  Gastrointestinal:  Soft, non-tender, normal bowel sounds  Musculoskeletal:  No joint swelling  Extremities:  LE edema improved. LLE erythema resolved.   Neurologic:  nonfocal  Psychiatric:  Normal affect, calm and appropriate         Discharge Medications        CHANGE how you take these medications        Instructions Prescription details   bumetanide 1 MG Tabs  Commonly known as: Bumex  Start taking on: June 17, 2025  What changed:   when to take this  additional instructions      Take 1 tablet (1 mg total) by mouth daily.   Quantity: 30 tablet  Refills: 0            CONTINUE taking these medications        Instructions Prescription details   acetaminophen 500 MG Tabs  Commonly known as: Tylenol Extra Strength      Take 1 tablet (500 mg total) by mouth every 6 (six) hours as needed for Fever (equal to or greater than 100.4).   Quantity: 1 tablet  Refills: 0     apixaban 2.5 MG Tabs  Commonly known as: Eliquis      Take 1 tablet (2.5 mg total) by mouth 2 (two) times daily.   Refills: 0     busPIRone 5 MG Tabs  Commonly known as: Buspar      Take 1 tablet (5 mg total) by mouth 2 (two) times daily.   Quantity: 180  tablet  Refills: 1     DULoxetine 60 MG Cpep  Commonly known as: Cymbalta      Take 1 capsule (60 mg total) by mouth daily. Take a total of 90 mg per day   Quantity: 90 capsule  Refills: 1     DULoxetine 30 MG Cpep  Commonly known as: Cymbalta      Take one capsule with duloxetine 60mg for a total dose of 90mg daily   Quantity: 90 capsule  Refills: 1     ferrous sulfate 325 (65 FE) MG Tbec      Take 1 tablet (325 mg total) by mouth daily with breakfast.   Refills: 0     melatonin 3 MG Tabs      Take 0.5 tablets (1.5 mg total) by mouth nightly.   Refills: 0     memantine 10 MG Tabs  Commonly known as: Namenda      Take 1 tablet (10 mg total) by mouth in the morning and 1 tablet (10 mg total) before bedtime.   Refills: 0     methylphenidate 10 MG Tabs  Commonly known as: Ritalin      Take 0.5 tablets (5 mg total) by mouth daily.   Quantity: 30 tablet  Refills: 0     metoprolol succinate ER 25 MG Tb24  Commonly known as: Toprol XL      Take 1 tablet (25 mg total) by mouth daily. Take one tablet (25mg total) by mouth daily   Quantity: 90 tablet  Refills: 1     Myrbetriq 50 MG Tb24  Generic drug: Mirabegron ER      Take 1 tablet (50 mg total) by mouth in the morning.   Refills: 0     omeprazole 20 MG Cpdr  Commonly known as: PriLOSEC      Take 2 capsules (40 mg total) by mouth every morning before breakfast.   Refills: 0     rosuvastatin 20 MG Tabs  Commonly known as: Crestor      Take 1 tablet (20 mg total) by mouth daily.   Quantity: 90 tablet  Refills: 3     spironolactone 25 MG Tabs  Commonly known as: Aldactone      Take 1 tablet (25 mg total) by mouth daily.   Refills: 0     tamsulosin 0.4 MG Caps  Commonly known as: Flomax      Take 1 capsule (0.4 mg total) by mouth in the morning.   Refills: 0     tolterodine 2 MG Tabs  Commonly known as: Detrol      Take 2 tablets (4 mg total) by mouth in the morning.   Refills: 0            STOP taking these medications      Loperamide HCl 2 MG Tabs  Commonly known as: IMODIUM  A-D        Mesalamine 4 g Enem                  Where to Get Your Medications        These medications were sent to [facility]/pharmacy #[id number] - [place], [place] - [address] AT [facility], [phone], [phone]  [address], [place] [address]      Phone: [phone]   bumetanide 1 MG Tabs           Hospital Discharge Diagnoses: Acute on chronic diastolic heart failure     Lace+ Score: 77  59-90 High Risk  29-58 Medium Risk  0-28   Low Risk.    TCM Follow-Up Recommendation:  LACE > 58: High Risk of readmission after discharge from the hospital.        I spent >30 minutes on this discharge. Discussed treatment and discharge plans.       Dg Pulido MD      Electronically signed by Dg Pulido MD on 6/16/2025 11:27 AM

## (undated) NOTE — LETTER
Hospital Discharge Documentation  Please phone to schedule a hospital follow up appointment.     From: [address] Hospitalist's Office  Phone: [phone]    Patient discharged time/date: 7/23/2021 11:18 AM  Patient discharge disposition:  Home or Self complaint of lightheadedness and near fainting episode.  As per wife there was a time when it appeared his voice was somewhat garbled as well however quickly resolved back to baseline.  Patient denies any episode where he felt weak or numb on any side of h daily.    Quantity: 3 tablet  Refills: 5                    CONTINUE taking these medications      Instructions Prescription details   DULoxetine HCl 30 MG Cpep  Commonly known as: CYMBALTA       Take 90 mg by mouth daily.    Refills: 0      Eliquis 5 MG T Hosp-Admission (Discharged) on 7/20/2021     ED to Hosp-Admission (Discharged) on 7/20/2021        Revision History        Detailed Report      Note shared with patient  Chart Review: Note Routing History    No routing history on file.

## (undated) NOTE — LETTER
[address] [place] [address]  Authorization for Invasive Procedures  1.  I hereby authorize Dr. Kailey Barrera          , my physician and whomever may be designated as the doctor's assistant, to perform the following operation and/or procedure: that despite careful testing and screening of blood and blood products, I may still be subject to ill effects as a result of recieving a blood transfusion an/or blood producst. The following are some, but not all, of the potential risks that can occur: fev 11. I acknowledge that my physician has explained sedation/analgesia administration to me including the risks and benefits. I consent to the administration of sedation/analgesia as may be necessary or desirable in the judgment of my physician.      Fabi

## (undated) NOTE — Clinical Note
Hello,  I have been following with . Terrie Beckett in the weight loss clinic. He left to [facility] for the winter and informed me today he had a fall with head injury this past March. He is having residual antalgic gait and some forgetfulness.  Nothing emergency like bu

## (undated) NOTE — Clinical Note
Saturnino Allen Md  [facility], [address]       06/16/2017        Patient: Thomas Bryant   YOB: 1940   Date of Visit: 6/16/2017       Dear  Dr. Ericka Max MD,      Thank you for referring Thomas Bryant to my practice.   Dre